# Patient Record
Sex: MALE | Race: WHITE | NOT HISPANIC OR LATINO | Employment: FULL TIME | URBAN - METROPOLITAN AREA
[De-identification: names, ages, dates, MRNs, and addresses within clinical notes are randomized per-mention and may not be internally consistent; named-entity substitution may affect disease eponyms.]

---

## 2017-01-04 ENCOUNTER — TELEPHONE (OUTPATIENT)
Dept: FAMILY MEDICINE | Facility: CLINIC | Age: 32
End: 2017-01-04

## 2017-01-04 NOTE — TELEPHONE ENCOUNTER
Patient would like to see if Dr. Tinsley or  would accept him as a new patient. Both doctors were highly recommended to him from his friend who works at [employer]. If they're willing to accept him as a patient, would they be willing to do a meet/greet or introductory appointment within the next couple of weeks?    Junie LAIRD  Central Scheduler

## 2017-01-05 NOTE — TELEPHONE ENCOUNTER
Sorry, I appreciate the request but I am not able to take him on at this time.    Sahil Tinsley MD

## 2017-01-23 ENCOUNTER — OFFICE VISIT (OUTPATIENT)
Dept: FAMILY MEDICINE | Facility: CLINIC | Age: 32
End: 2017-01-23
Payer: COMMERCIAL

## 2017-01-23 VITALS
OXYGEN SATURATION: 98 % | DIASTOLIC BLOOD PRESSURE: 79 MMHG | SYSTOLIC BLOOD PRESSURE: 126 MMHG | WEIGHT: 210 LBS | BODY MASS INDEX: 30.06 KG/M2 | HEART RATE: 77 BPM | HEIGHT: 70 IN | TEMPERATURE: 98 F | RESPIRATION RATE: 14 BRPM

## 2017-01-23 DIAGNOSIS — M25.562 LEFT KNEE PAIN, UNSPECIFIED CHRONICITY: ICD-10-CM

## 2017-01-23 DIAGNOSIS — R09.A2 GLOBUS SENSATION: ICD-10-CM

## 2017-01-23 DIAGNOSIS — N41.9 PROSTATITIS, UNSPECIFIED PROSTATITIS TYPE: ICD-10-CM

## 2017-01-23 DIAGNOSIS — M25.512 LEFT SHOULDER PAIN, UNSPECIFIED CHRONICITY: ICD-10-CM

## 2017-01-23 DIAGNOSIS — F90.8 ATTENTION-DEFICIT HYPERACTIVITY DISORDER, OTHER TYPE: Primary | ICD-10-CM

## 2017-01-23 DIAGNOSIS — H90.8 MIXED HEARING LOSS: ICD-10-CM

## 2017-01-23 LAB
ALBUMIN SERPL-MCNC: 4.2 G/DL (ref 3.4–5)
ALBUMIN UR-MCNC: NEGATIVE MG/DL
ALP SERPL-CCNC: 63 U/L (ref 40–150)
ALT SERPL W P-5'-P-CCNC: 31 U/L (ref 0–70)
ANION GAP SERPL CALCULATED.3IONS-SCNC: 7 MMOL/L (ref 3–14)
APPEARANCE UR: CLEAR
AST SERPL W P-5'-P-CCNC: 14 U/L (ref 0–45)
BASOPHILS # BLD AUTO: 0 10E9/L (ref 0–0.2)
BASOPHILS NFR BLD AUTO: 0.3 %
BILIRUB SERPL-MCNC: 0.5 MG/DL (ref 0.2–1.3)
BILIRUB UR QL STRIP: NEGATIVE
BUN SERPL-MCNC: 18 MG/DL (ref 7–30)
CALCIUM SERPL-MCNC: 9.1 MG/DL (ref 8.5–10.1)
CHLORIDE SERPL-SCNC: 104 MMOL/L (ref 94–109)
CHOLEST SERPL-MCNC: 214 MG/DL
CO2 SERPL-SCNC: 27 MMOL/L (ref 20–32)
COLOR UR AUTO: YELLOW
CREAT SERPL-MCNC: 1.17 MG/DL (ref 0.66–1.25)
DIFFERENTIAL METHOD BLD: NORMAL
EOSINOPHIL # BLD AUTO: 0.2 10E9/L (ref 0–0.7)
EOSINOPHIL NFR BLD AUTO: 3 %
ERYTHROCYTE [DISTWIDTH] IN BLOOD BY AUTOMATED COUNT: 12.5 % (ref 10–15)
GFR SERPL CREATININE-BSD FRML MDRD: 72 ML/MIN/1.7M2
GLUCOSE SERPL-MCNC: 91 MG/DL (ref 70–99)
GLUCOSE UR STRIP-MCNC: NEGATIVE MG/DL
HCT VFR BLD AUTO: 42.8 % (ref 40–53)
HDLC SERPL-MCNC: 40 MG/DL
HGB BLD-MCNC: 14.8 G/DL (ref 13.3–17.7)
HGB UR QL STRIP: NEGATIVE
KETONES UR STRIP-MCNC: NEGATIVE MG/DL
LDLC SERPL CALC-MCNC: 148 MG/DL
LEUKOCYTE ESTERASE UR QL STRIP: NEGATIVE
LYMPHOCYTES # BLD AUTO: 2 10E9/L (ref 0.8–5.3)
LYMPHOCYTES NFR BLD AUTO: 30.1 %
MCH RBC QN AUTO: 27.9 PG (ref 26.5–33)
MCHC RBC AUTO-ENTMCNC: 34.6 G/DL (ref 31.5–36.5)
MCV RBC AUTO: 81 FL (ref 78–100)
MONOCYTES # BLD AUTO: 0.7 10E9/L (ref 0–1.3)
MONOCYTES NFR BLD AUTO: 10.3 %
NEUTROPHILS # BLD AUTO: 3.8 10E9/L (ref 1.6–8.3)
NEUTROPHILS NFR BLD AUTO: 56.3 %
NITRATE UR QL: NEGATIVE
NONHDLC SERPL-MCNC: 174 MG/DL
PH UR STRIP: 7 PH (ref 5–7)
PLATELET # BLD AUTO: 247 10E9/L (ref 150–450)
POTASSIUM SERPL-SCNC: 4 MMOL/L (ref 3.4–5.3)
PROT SERPL-MCNC: 8 G/DL (ref 6.8–8.8)
PSA SERPL-MCNC: 0.48 UG/L (ref 0–4)
RBC # BLD AUTO: 5.31 10E12/L (ref 4.4–5.9)
SODIUM SERPL-SCNC: 138 MMOL/L (ref 133–144)
SP GR UR STRIP: 1.01 (ref 1–1.03)
TRIGL SERPL-MCNC: 128 MG/DL
TSH SERPL DL<=0.005 MIU/L-ACNC: 1.73 MU/L (ref 0.4–4)
URN SPEC COLLECT METH UR: NORMAL
UROBILINOGEN UR STRIP-ACNC: 0.2 EU/DL (ref 0.2–1)
WBC # BLD AUTO: 6.8 10E9/L (ref 4–11)

## 2017-01-23 PROCEDURE — 80050 GENERAL HEALTH PANEL: CPT | Performed by: PREVENTIVE MEDICINE

## 2017-01-23 PROCEDURE — 80061 LIPID PANEL: CPT | Performed by: PREVENTIVE MEDICINE

## 2017-01-23 PROCEDURE — 36415 COLL VENOUS BLD VENIPUNCTURE: CPT | Performed by: PREVENTIVE MEDICINE

## 2017-01-23 PROCEDURE — 99215 OFFICE O/P EST HI 40 MIN: CPT | Performed by: PREVENTIVE MEDICINE

## 2017-01-23 PROCEDURE — 81003 URINALYSIS AUTO W/O SCOPE: CPT | Performed by: PREVENTIVE MEDICINE

## 2017-01-23 PROCEDURE — 84153 ASSAY OF PSA TOTAL: CPT | Performed by: PREVENTIVE MEDICINE

## 2017-01-23 RX ORDER — ATOMOXETINE 80 MG/1
CAPSULE ORAL
Qty: 30 CAPSULE | Refills: 1 | Status: SHIPPED | OUTPATIENT
Start: 2017-01-23 | End: 2017-04-04

## 2017-01-23 NOTE — MR AVS SNAPSHOT
After Visit Summary   1/23/2017    Manjula Dorman    MRN: [id number]           Patient Information     Date Of Birth          1985        Visit Information        Provider Department      1/23/2017 2:00 PM Tod Grossman MD [facility]        Care Instructions      Preventive Health Recommendations  Male Ages 26 - 39    Yearly exam:             See your health care provider every year in order to  o   Review health changes.   o   Discuss preventive care.    o   Review your medicines if your doctor has prescribed any.    You should be tested each year for STDs (sexually transmitted diseases), if you re at risk.     After age 35, talk to your provider about cholesterol testing. If you are at risk for heart disease, have your cholesterol tested at least every 5 years.     If you are at risk for diabetes, you should have a diabetes test (fasting glucose).  Shots: Get a flu shot each year. Get a tetanus shot every 10 years.     Nutrition:    Eat at least 5 servings of fruits and vegetables daily.     Eat whole-grain bread, whole-wheat pasta and brown rice instead of white grains and rice.     Talk to your provider about Calcium and Vitamin D.     Lifestyle    Exercise for at least 150 minutes a week (30 minutes a day, 5 days a week). This will help you control your weight and prevent disease.     Limit alcohol to one drink per day.     No smoking.     Wear sunscreen to prevent skin cancer.     See your dentist every six months for an exam and cleaning.             Follow-ups after your visit        Who to contact     If you have questions or need follow up information about today's clinic visit or your schedule please contact [facility] directly at [phone].  Normal or non-critical lab and imaging results will be communicated to you by MyChart, letter or phone within 4 business days after the clinic has received the results. If you do not hear from us  "within 7 days, please contact the clinic through Emu Messenger or phone. If you have a critical or abnormal lab result, we will notify you by phone as soon as possible.  Submit refill requests through Emu Messenger or call your pharmacy and they will forward the refill request to us. Please allow 3 business days for your refill to be completed.          Additional Information About Your Visit        [employer] Information     E[employer] Messenger gives you secure access to your electronic health record. If you see a primary care provider, you can also send messages to your care team and make appointments. If you have questions, please call your primary care clinic.  If you do not have a primary care provider, please call [phone] and they will assist you.        Care EveryWhere ID     This is your Care EveryWhere ID. This could be used by other organizations to access your [facility] medical records  [id number]        Your Vitals Were     Pulse Temperature Respirations Height BMI (Body Mass Index) Pulse Oximetry    77 98  F (36.7  C) (Tympanic) 14 5' 9.72\" (1.771 m) 30.37 kg/m2 98%       Blood Pressure from Last 3 Encounters:   01/23/17 126/79   04/21/16 126/88   02/24/16 128/76    Weight from Last 3 Encounters:   01/23/17 210 lb (95.255 kg)   04/21/16 198 lb (89.812 kg)   02/24/16 199 lb 11.2 oz (90.583 kg)              Today, you had the following     No orders found for display         Today's Medication Changes          These changes are accurate as of: 1/23/17  2:11 PM.  If you have any questions, ask your nurse or doctor.               Stop taking these medicines if you haven't already. Please contact your care team if you have questions.     atomoxetine 80 MG capsule   Commonly known as:  STRATTERA   Stopped by:  Tod Grossman MD           Melatonin 1 MG Subl   Stopped by:  Tod Grossman MD           sulfamethoxazole-trimethoprim 800-160 MG per tablet   Commonly known as:  BACTRIM DS/SEPTRA DS "   Stopped by:  Tod Grossman MD                    Primary Care Provider Office Phone # Fax #    Tod Grossman [phone]       [facility] [address] [address]        Thank you!     Thank you for choosing [facility]  for your care. Our goal is always to provide you with excellent care. Hearing back from our patients is one way we can continue to improve our services. Please take a few minutes to complete the written survey that you may receive in the mail after your visit with us. Thank you!             Your Updated Medication List - Protect others around you: Learn how to safely use, store and throw away your medicines at www.disposemymeds.org.          This list is accurate as of: 1/23/17  2:11 PM.  Always use your most recent med list.                   Brand Name Dispense Instructions for use    ibuprofen 800 MG tablet    ADVIL/MOTRIN    60 tablet    Take 1 tablet (800 mg) by mouth every 8 hours as needed for moderate pain
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
## 2017-01-23 NOTE — NURSING NOTE
"Chief Complaint   Patient presents with     Physical     fasting       Initial /79 mmHg  Pulse 77  Temp(Src) 98  F (36.7  C) (Tympanic)  Resp 14  Ht 5' 9.72\" (1.771 m)  Wt 210 lb (95.255 kg)  BMI 30.37 kg/m2  SpO2 98% Estimated body mass index is 30.37 kg/(m^2) as calculated from the following:    Height as of this encounter: 5' 9.72\" (1.771 m).    Weight as of this encounter: 210 lb (95.255 kg).  BP completed using cuff size: ayala Head CMA January 23, 2017 2:06 PM      "

## 2017-01-24 ENCOUNTER — MYC MEDICAL ADVICE (OUTPATIENT)
Dept: FAMILY MEDICINE | Facility: CLINIC | Age: 32
End: 2017-01-24

## 2017-01-24 DIAGNOSIS — N41.8 OTHER PROSTATIC INFLAMMATORY DISEASES: Primary | ICD-10-CM

## 2017-01-24 RX ORDER — SULFAMETHOXAZOLE/TRIMETHOPRIM 800-160 MG
1 TABLET ORAL 2 TIMES DAILY
Qty: 84 TABLET | Refills: 0 | Status: SHIPPED | OUTPATIENT
Start: 2017-01-24 | End: 2017-04-04

## 2017-01-24 NOTE — TELEPHONE ENCOUNTER
Dr. Grossman,     Patient reports that pharmacy has not received Bactrim prescription.     Please advise based on Mobjoy[employer] message below.    Stormy Junior RN

## 2017-01-25 ENCOUNTER — MYC MEDICAL ADVICE (OUTPATIENT)
Dept: FAMILY MEDICINE | Facility: CLINIC | Age: 32
End: 2017-01-25

## 2017-01-26 ENCOUNTER — MYC MEDICAL ADVICE (OUTPATIENT)
Dept: FAMILY MEDICINE | Facility: CLINIC | Age: 32
End: 2017-01-26

## 2017-01-26 DIAGNOSIS — F98.8 ADD (ATTENTION DEFICIT DISORDER): Primary | ICD-10-CM

## 2017-01-27 RX ORDER — ATOMOXETINE 40 MG/1
40 CAPSULE ORAL DAILY
Qty: 30 CAPSULE | Refills: 1 | Status: SHIPPED | OUTPATIENT
Start: 2017-01-27 | End: 2017-01-27

## 2017-01-27 RX ORDER — ATOMOXETINE 40 MG/1
40 CAPSULE ORAL DAILY
Qty: 30 CAPSULE | Refills: 1 | Status: SHIPPED | OUTPATIENT
Start: 2017-01-27 | End: 2017-03-09 | Stop reason: DRUGHIGH

## 2017-01-29 NOTE — PROGRESS NOTES
"SUBJECTIVE:  Manjula Dorman, a 31 year old male scheduled an appointment to discuss the following issues:     Attention-deficit hyperactivity disorder, other type  Prostatitis, unspecified prostatitis type  Globus sensation  Mixed hearing loss  Left knee pain, unspecified chronicity  Left shoulder pain, unspecified chronicity  Pt here for follow up   Would like to restart medicine for adhd  Also has fullness in throat  Also some problems with hearing  Knee and shoulder pain  Also pelvic discomfort- same as prostatitis last year    Medical, social, surgical, and family histories reviewed.    ROS:  C: NEGATIVE for fever, chills  E: NEGATIVE for vision changes   R: NEGATIVE for significant cough or SOB  CV: NEGATIVE for chest pain, palpitations   GI: NEGATIVE for nausea, abdominal pain, heartburn, or change in bowel habits  : NEGATIVE for frequency, dysuria, or hematuria  M: NEGATIVE for significant arthralgias or myalgia  N: NEGATIVE for weakness, dizziness or paresthesias or headache    OBJECTIVE:  /79 mmHg  Pulse 77  Temp(Src) 98  F (36.7  C) (Tympanic)  Resp 14  Ht 5' 9.72\" (1.771 m)  Wt 210 lb (95.255 kg)  BMI 30.37 kg/m2  SpO2 98%  EXAM:  GENERAL APPEARANCE: healthy, alert and no distress  EYES: EOMI,  PERRL  HENT: ear canals and TM's normal and nose and mouth without ulcers or lesions  RESP: lungs clear to auscultation - no rales, rhonchi or wheezes  CV: regular rates and rhythm, normal S1 S2, no S3 or S4 and no murmur, click or rub -  ABDOMEN:  soft, nontender, no HSM or masses and bowel sounds normal  R SHOULDER -neg neer/herron, nl strenght int/ext rot and abd.  Normal active rom of shoulder and neck.  Neg spurlings.  No ttp of c spine or ac joint, clavicle or subacromial space.  Nl strength, sensation, and reflex in lue.  MSK - left knee - no ttp, neg ant, post drawer, neg mcmurrarys, trace effusion, no warmth, no erythema, nl rom, nl gait   - ttp prostate    ASSESSMENT/PLAN:  (F90.8) " Attention-deficit hyperactivity disorder, other type  (primary encounter diagnosis)  Start strattera 40 mg daily  Plan: atomoxetine (STRATTERA) 80 MG capsule, CBC with        platelets differential, Comprehensive metabolic        panel, Lipid panel reflex to direct LDL, TSH         with free T4 reflex  Recheck 2 months    (N41.9) Prostatitis, unspecified prostatitis type  Plan: UA reflex to Microscopic and Culture, PSA,         tumor marker  Will restart bactrim for this issue  Recheck 2 months    (F45.8) Globus sensation  Plan: OTOLARYNGOLOGY REFERRAL  Will refer to ent for evaluation     (H90.8) Mixed hearing loss  Plan: OTOLARYNGOLOGY REFERRAL  ent referral for this as well    (M25.562) Left knee pain, unspecified chronicity  Plan: ORTHOPEDICS ADULT REFERRAL    (M25.512) Left shoulder pain, unspecified chronicity  Plan: ORTHOPEDICS ADULT REFERRAL  Will refer to ortho for knee and shoulder pain    40 minutes spent with patient, over 50% time counseling, coordinating care and explaining about nature of the patient's conditions.    All risks, benefits of treatment and further evaluation was reviewed with patient.  Pt expressed understanding.  Pt was in agreement with this plan.  Tod Grossman MD

## 2017-02-28 ENCOUNTER — MYC MEDICAL ADVICE (OUTPATIENT)
Dept: FAMILY MEDICINE | Facility: CLINIC | Age: 32
End: 2017-02-28

## 2017-02-28 DIAGNOSIS — N41.9 PROSTATITIS, UNSPECIFIED PROSTATITIS TYPE: Primary | ICD-10-CM

## 2017-03-08 ENCOUNTER — TELEPHONE (OUTPATIENT)
Dept: FAMILY MEDICINE | Facility: CLINIC | Age: 32
End: 2017-03-08

## 2017-03-08 NOTE — TELEPHONE ENCOUNTER
Left message for pt on home number, and also sent my chart as pt communicates this way often.  Huddled with PCP, PLEASE SCHEDULE PT TOMORROW! Ok to use AM DOD spot or [facility] follow up spots  Dasha Pitt RN

## 2017-03-08 NOTE — TELEPHONE ENCOUNTER
Reason for call:  Patient reporting a symptom    Symptom or request: Difficulties urinating, painful ejaculation,   Nauseas, off balance, upset stomach    Duration (how long have symptoms been present): 2 weeks    Have you been treated for this before?     Additional comments: pt states that this happens after  He takes atomoxetine (STRATTERA) 40 MG capsule  Pt also stated he was taking 80 MG a day  Pt does have apt scheduled on 3/23  With Dr. Grossman    Phone Number patient can be reached at:  Other phone number:  [phone]    Best Time:  anytime      Can we leave a detailed message on this number:  YES    Call taken on 3/8/2017 at 12:12 PM by Farideh Ortiz

## 2017-03-08 NOTE — TELEPHONE ENCOUNTER
Yes, these are all potential side effects from Strattera.    Shamika Sexton, PharmD, [facility]  Medication Therapy Management Provider  Pager: [phone]

## 2017-03-09 ENCOUNTER — OFFICE VISIT (OUTPATIENT)
Dept: FAMILY MEDICINE | Facility: CLINIC | Age: 32
End: 2017-03-09
Payer: COMMERCIAL

## 2017-03-09 VITALS
SYSTOLIC BLOOD PRESSURE: 125 MMHG | OXYGEN SATURATION: 97 % | HEART RATE: 86 BPM | HEIGHT: 70 IN | TEMPERATURE: 98.3 F | WEIGHT: 201 LBS | BODY MASS INDEX: 28.77 KG/M2 | DIASTOLIC BLOOD PRESSURE: 79 MMHG

## 2017-03-09 DIAGNOSIS — F41.9 ANXIETY: ICD-10-CM

## 2017-03-09 DIAGNOSIS — F90.9 ATTENTION DEFICIT HYPERACTIVITY DISORDER (ADHD), UNSPECIFIED ADHD TYPE: Primary | ICD-10-CM

## 2017-03-09 PROCEDURE — 99214 OFFICE O/P EST MOD 30 MIN: CPT | Performed by: PREVENTIVE MEDICINE

## 2017-03-09 RX ORDER — DEXTROAMPHETAMINE SACCHARATE, AMPHETAMINE ASPARTATE, DEXTROAMPHETAMINE SULFATE AND AMPHETAMINE SULFATE 2.5; 2.5; 2.5; 2.5 MG/1; MG/1; MG/1; MG/1
10 TABLET ORAL 2 TIMES DAILY
Qty: 60 TABLET | Refills: 0 | Status: SHIPPED | OUTPATIENT
Start: 2017-03-09 | End: 2017-05-04

## 2017-03-09 NOTE — LETTER
[facility]    03/09/17    Patient: Manjula Dorman  YOB: 1985  Medical Record Number: [id number]                                                                  Controlled Substance Agreement  I understand that my care provider has prescribed controlled substances (narcotics, tranquilizers, and/or stimulants) to help manage my condition(s).  I am taking this medicine to help me function or work.  I know that this is strong medicine.  It could have serious side effects and even cause a dependency on the drug.  If I stop these medicines suddenly, I could have severe withdrawal symptoms.    The risks, benefits, and side effects of these medication(s) were explained to me.  I agree that:  1. I will take part in other treatments as advised by my provider.  This may be psychiatry or counseling, physical therapy, behavioral therapy, group treatment, or a referral to a pain clinic.  I will reduce or stop my medicine when my provider tells me to do so.   2. I will take my medicines as prescribed.  I will not change the dose or schedule unless my provider tells me to.  There will be no refills if I  run out early.   I may be contacted at any time without warning and asked to complete a drug test or pill count.   3. I will keep all my appointments at the clinic.  If I miss appointments or fail to follow instructions, my provider may stop my medicine.  4. I will not ask other providers to prescribe controlled substances. And I will not accept controlled substances from other people. If I need another prescribed controlled substance for a new reason, I will notify my provider within one business day.  5. If I enroll in the [place] Medical Marijuana program, I will tell my provider.  I will also sign an agreement to share my medical records with my provider.  6. I will use one pharmacy to fill all of my controlled substance prescriptions.  If my prescription is mailed to my pharmacy, it may take 5 to 7  days for my medicine to be ready.  7. I understand that my provider, clinic care team, and pharmacy can track controlled substance prescriptions from other providers through a central database (prescription monitoring program).  8. I will bring in my list of medications (or my medicine bottles) each time I come to the clinic.  REV-  04/2016                                                                                                                                            Page 1 of 2      [facility]    03/09/17    Patient: Manjula Dorman  YOB: 1985  Medical Record Number: [id number]    9. Refills of controlled substances will be made only during office hours.  It is up to me to make sure that I do not run out of my medicines on weekends or holidays.    10. I am responsible for my prescriptions.  If the medicine is lost or stolen, it will not be replaced.   I also agree not to share these medicines with anyone.  11. I agree to not use ANY illegal or recreational drugs.  This includes marijuana, cocaine, bath salts or other drugs.  I agree not to use alcohol unless my provider says I may.  I agree to give urine samples whenever asked.  If I fail to give a urine sample, the provider may stop my medicine.     12. I will tell my nurse or provider right away if I become pregnant or have a new medical problem treated outside of [facility].  13. I understand that this medicine can affect my thinking and judgment.  It may be unsafe for me to drive, use machinery and do dangerous tasks.  I will not do any of these things until I know how the medicine affects me.  If my dose changes, I will wait to see how it affects me.  I will contact my provider if I have concerns about medicine side effects.  I understand that if I do not follow any of the conditions above, my prescriptions or treatment may be stopped.    I agree that my provider, clinic care team, and pharmacy may work with any city,  state or federal law enforcement agency that investigates the misuse, sale, or other diversion of my controlled medicine. I will allow my provider to discuss my care with or share a copy of this agreement with any other treating provider, pharmacy or emergency room where I receive care.  I agree to give up (waive) any right of privacy or confidentiality with respect to these authorizations.   I have read this agreement and have asked questions about anything I did not understand.   ___________________________________    ___________________________  Patient Signature                                                           Date and Time  ___________________________________     ____________________________  Witness                                                                            Date and Time  ___________________________________  Tod Grossman MD, MD  REV-  04/2016                                                                                                                                                                 Page 2 of 2

## 2017-03-09 NOTE — NURSING NOTE
"Chief Complaint   Patient presents with     Recheck Medication     STRATTERA       Initial /79 (BP Location: Right arm, Patient Position: Chair, Cuff Size: Adult Large)  Pulse 86  Temp 98.3  F (36.8  C) (Oral)  Ht 5' 9.72\" (1.771 m)  Wt 201 lb (91.2 kg)  SpO2 97%  BMI 29.07 kg/m2 Estimated body mass index is 29.07 kg/(m^2) as calculated from the following:    Height as of this encounter: 5' 9.72\" (1.771 m).    Weight as of this encounter: 201 lb (91.2 kg).  Medication Reconciliation: complete   Yennifer Hi- SHANNAN      "

## 2017-03-09 NOTE — PROGRESS NOTES
"SUBJECTIVE:  Manjula Dorman, a 32 year old male scheduled an appointment to discuss the following issues:     Attention deficit hyperactivity disorder (ADHD), unspecified ADHD type  Anxiety  strattera causing sexual side effects for pt, it did help him focus at work  Pt also with significant anxiety and depressive symptoms  No si or hi      Medical, social, surgical, and family histories reviewed.      ROS:  C: NEGATIVE for fever, chills  E: NEGATIVE for vision changes   R: NEGATIVE for significant cough or SOB  CV: NEGATIVE for chest pain, palpitations   GI: NEGATIVE for nausea, abdominal pain, heartburn, or change in bowel habits  : NEGATIVE for frequency, dysuria, or hematuria  M: NEGATIVE for significant arthralgias or myalgia  N: NEGATIVE for weakness, dizziness or paresthesias or headache    OBJECTIVE:  /79 (BP Location: Right arm, Patient Position: Chair, Cuff Size: Adult Large)  Pulse 86  Temp 98.3  F (36.8  C) (Oral)  Ht 5' 9.72\" (1.771 m)  Wt 201 lb (91.2 kg)  SpO2 97%  BMI 29.07 kg/m2  EXAM:  GENERAL APPEARANCE: healthy, alert and no distress  EYES: EOMI,  PERRL  HENT: ear canals and TM's normal and nose and mouth without ulcers or lesions  RESP: lungs clear to auscultation - no rales, rhonchi or wheezes  CV: regular rates and rhythm, normal S1 S2, no S3 or S4 and no murmur, click or rub -  ABDOMEN:  soft, nontender, no HSM or masses and bowel sounds normal    ASSESSMENT/PLAN:  (F90.9) Attention deficit hyperactivity disorder (ADHD), unspecified ADHD type  (primary encounter diagnosis)  Plan: amphetamine-dextroamphetamine (ADDERALL) 10 MG         per tablet  Will start adderall 10 mg bid  Recheck with me in one month    (F41.9) Anxiety  Plan: sertraline (ZOLOFT) 50 MG tablet  Start zoloft for anxiety and depression    25 minutes spent with patient, over 50% time counseling, coordinating care and explaining about nature of the patient's conditions.    All risks, benefits of treatment and further " evaluation was reviewed with patient.  Pt expressed understanding.  Pt was in agreement with this plan.  Tod Grossman MD

## 2017-03-09 NOTE — MR AVS SNAPSHOT
After Visit Summary   3/9/2017    Manjula Dorman    MRN: [id number]           Patient Information     Date Of Birth          1985        Visit Information        Provider Department      3/9/2017 11:00 AM Tod Grossman MD [facility]         Follow-ups after your visit        Your next 10 appointments already scheduled     Mar 23, 2017  4:00 PM CDT   Office Visit with Tod Grossman MD   [facility] ([facility])    [address] [facility] [id number] [phone]           Bring a current list of meds and any records pertaining to this visit.  For Physicals, please bring immunization records and any forms needing to be filled out.  Please arrive 10 minutes early to complete paperwork.            Apr 04, 2017  1:30 PM CDT   (Arrive by 1:15 PM)   New Patient Visit with KENYATTA Moseley   [facility] and [facility] for Pro[facility] and Urologic C[facility] ([facility])    [address] [place] [address]  4th [facility] [address] [phone]              Who to contact     If you have questions or need follow up information about today's clinic visit or your schedule please contact [facility] directly at [phone].  Normal or non-critical lab and imaging results will be communicated to you by [employer], letter or phone within 4 business days after the clinic has received the results. If you do not hear from us within 7 days, please contact the clinic through [employer] or phone. If you have a critical or abnormal lab result, we will notify you by phone as soon as possible.  Submit refill requests through [employer] or call your pharmacy and they will forward the refill request to us. Please allow 3 business days for your refill to be completed.          Additional Information About Your Visit        [email or web address] Information     M[employer] gives you secure access to your electronic health record. If  "you see a primary care provider, you can also send messages to your care team and make appointments. If you have questions, please call your primary care clinic.  If you do not have a primary care provider, please call [phone] and they will assist you.        Care EveryWhere ID     This is your Care EveryWhere ID. This could be used by other organizations to access your [place] medical records  [id number]        Your Vitals Were     Pulse Temperature Height Pulse Oximetry BMI (Body Mass Index)       86 98.3  F (36.8  C) (Oral) 5' 9.72\" (1.771 m) 97% 29.07 kg/m2        Blood Pressure from Last 3 Encounters:   03/09/17 125/79   01/23/17 126/79   04/21/16 126/88    Weight from Last 3 Encounters:   03/09/17 201 lb (91.2 kg)   01/23/17 210 lb (95.3 kg)   04/21/16 198 lb (89.8 kg)              Today, you had the following     No orders found for display         Today's Medication Changes          These changes are accurate as of: 3/9/17 11:12 AM.  If you have any questions, ask your nurse or doctor.               These medicines have changed or have updated prescriptions.        Dose/Directions    atomoxetine 80 MG capsule   Commonly known as:  STRATTERA   This may have changed:  Another medication with the same name was removed. Continue taking this medication, and follow the directions you see here.   Used for:  Attention-deficit hyperactivity disorder, other type   Changed by:  Tod Grossman MD        40 mg daily for 3 days then 80 mg daily ongoing   Quantity:  30 capsule   Refills:  1                Primary Care Provider Office Phone # Fax #    Tod Grossman [phone]       [facility] [address] [facility] [address]  [facility] [address]        Thank you!     Thank you for choosing [facility]  for your care. Our goal is always to provide you with excellent care. Hearing back from our patients is one way we can continue to improve our " services. Please take a few minutes to complete the written survey that you may receive in the mail after your visit with us. Thank you!             Your Updated Medication List - Protect others around you: Learn how to safely use, store and throw away your medicines at www.disposemymeds.org.          This list is accurate as of: 3/9/17 11:12 AM.  Always use your most recent med list.                   Brand Name Dispense Instructions for use    atomoxetine 80 MG capsule    STRATTERA    30 capsule    40 mg daily for 3 days then 80 mg daily ongoing       ibuprofen 800 MG tablet    ADVIL/MOTRIN    60 tablet    Take 1 tablet (800 mg) by mouth every 8 hours as needed for moderate pain       sulfamethoxazole-trimethoprim 800-160 MG per tablet    BACTRIM DS/SEPTRA DS    84 tablet    Take 1 tablet by mouth 2 times daily

## 2017-03-10 ENCOUNTER — TELEPHONE (OUTPATIENT)
Dept: FAMILY MEDICINE | Facility: CLINIC | Age: 32
End: 2017-03-10

## 2017-03-10 NOTE — TELEPHONE ENCOUNTER
PA has been approved for the generic Adderall 10 mg for up to twice daily dosing. Start Date:02/08/2017;Coverage End Date:03/09/2020    Pato Pitts CMA

## 2017-03-13 ENCOUNTER — PRE VISIT (OUTPATIENT)
Dept: UROLOGY | Facility: CLINIC | Age: 32
End: 2017-03-13

## 2017-03-13 PROBLEM — F41.9 ANXIETY: Status: ACTIVE | Noted: 2017-03-13

## 2017-03-13 NOTE — TELEPHONE ENCOUNTER
Records are available in [facility]. Pt has seen Dr. Carvajal in clinic in 2011. vernon

## 2017-04-03 ASSESSMENT — ENCOUNTER SYMPTOMS
DIFFICULTY URINATING: 1
TREMORS: 1
HEMATURIA: 0
HEADACHES: 1
SNORES LOUDLY: 1
DEPRESSION: 1
MEMORY LOSS: 1
RESPIRATORY PAIN: 1
HOARSE VOICE: 1
WHEEZING: 1
INSOMNIA: 1
RECTAL BLEEDING: 1
SMELL DISTURBANCE: 0
NIGHT SWEATS: 1
SINUS PAIN: 1
WEAKNESS: 1
HALLUCINATIONS: 0
POLYPHAGIA: 1
PARALYSIS: 0
BLOATING: 1
FATIGUE: 1
BLOOD IN STOOL: 0
CHILLS: 1
DIZZINESS: 1
DECREASED APPETITE: 1
FEVER: 1
DYSPNEA ON EXERTION: 1
SEIZURES: 0
POOR WOUND HEALING: 0
TROUBLE SWALLOWING: 0
SPEECH CHANGE: 1
VOMITING: 0
DISTURBANCES IN COORDINATION: 1
LOSS OF CONSCIOUSNESS: 0
ABDOMINAL PAIN: 0
NECK MASS: 0
NAUSEA: 0
POLYDIPSIA: 0
WEIGHT GAIN: 1
NERVOUS/ANXIOUS: 1
TASTE DISTURBANCE: 0
BOWEL INCONTINENCE: 0
CONSTIPATION: 1
NUMBNESS: 0
SORE THROAT: 1
JAUNDICE: 0
NAIL CHANGES: 0
WEIGHT LOSS: 0
FLANK PAIN: 1
RECTAL PAIN: 1
PANIC: 0
POSTURAL DYSPNEA: 1
COUGH: 1
DYSURIA: 1
SHORTNESS OF BREATH: 1
HEMOPTYSIS: 0
TINGLING: 0
ALTERED TEMPERATURE REGULATION: 0
SKIN CHANGES: 0
COUGH DISTURBING SLEEP: 1
INCREASED ENERGY: 1
DECREASED CONCENTRATION: 1
SINUS CONGESTION: 1
SPUTUM PRODUCTION: 1
HEARTBURN: 0
DIARRHEA: 0

## 2017-04-04 ENCOUNTER — OFFICE VISIT (OUTPATIENT)
Dept: UROLOGY | Facility: CLINIC | Age: 32
End: 2017-04-04

## 2017-04-04 VITALS
HEIGHT: 70 IN | HEART RATE: 89 BPM | WEIGHT: 200 LBS | DIASTOLIC BLOOD PRESSURE: 81 MMHG | BODY MASS INDEX: 28.63 KG/M2 | SYSTOLIC BLOOD PRESSURE: 127 MMHG

## 2017-04-04 DIAGNOSIS — R10.31 RLQ ABDOMINAL PAIN: ICD-10-CM

## 2017-04-04 DIAGNOSIS — R10.2 PELVIC PAIN IN MALE: ICD-10-CM

## 2017-04-04 DIAGNOSIS — N42.81 PROSTATE PAIN: Primary | ICD-10-CM

## 2017-04-04 DIAGNOSIS — Z31.41 FERTILITY TESTING: ICD-10-CM

## 2017-04-04 LAB
ALBUMIN UR-MCNC: NEGATIVE MG/DL
APPEARANCE UR: CLEAR
BILIRUB UR QL STRIP: NEGATIVE
COLOR UR AUTO: ABNORMAL
GLUCOSE UR STRIP-MCNC: NEGATIVE MG/DL
HGB UR QL STRIP: NEGATIVE
KETONES UR STRIP-MCNC: NEGATIVE MG/DL
LEUKOCYTE ESTERASE UR QL STRIP: NEGATIVE
MUCOUS THREADS #/AREA URNS LPF: PRESENT /LPF
NITRATE UR QL: NEGATIVE
PH UR STRIP: 7 PH (ref 5–7)
RBC #/AREA URNS AUTO: 1 /HPF (ref 0–2)
SP GR UR STRIP: 1.01 (ref 1–1.03)
SQUAMOUS #/AREA URNS AUTO: <1 /HPF (ref 0–1)
URN SPEC COLLECT METH UR: ABNORMAL
UROBILINOGEN UR STRIP-MCNC: 0 MG/DL (ref 0–2)
WBC #/AREA URNS AUTO: 1 /HPF (ref 0–2)

## 2017-04-04 RX ORDER — ALBUTEROL SULFATE 90 UG/1
AEROSOL, METERED RESPIRATORY (INHALATION)
Refills: 0 | COMMUNITY
Start: 2017-03-13 | End: 2017-04-04

## 2017-04-04 RX ORDER — DOXYCYCLINE 100 MG/1
100 CAPSULE ORAL 2 TIMES DAILY
Qty: 42 CAPSULE | Refills: 1 | Status: SHIPPED | OUTPATIENT
Start: 2017-04-04 | End: 2017-07-21

## 2017-04-04 RX ORDER — ATOMOXETINE HYDROCHLORIDE 40 MG/1
CAPSULE ORAL
Refills: 1 | COMMUNITY
Start: 2017-01-27 | End: 2017-04-04

## 2017-04-04 RX ORDER — BENZONATATE 100 MG/1
CAPSULE ORAL
Refills: 0 | COMMUNITY
Start: 2017-03-13 | End: 2017-04-04

## 2017-04-04 ASSESSMENT — PAIN SCALES - GENERAL: PAINLEVEL: NO PAIN (1)

## 2017-04-04 NOTE — MR AVS SNAPSHOT
After Visit Summary   4/4/2017    Manjula Dorman    MRN: [id number]           Patient Information     Date Of Birth          1985        Visit Information        Provider Department      4/4/2017 1:30 PM Shyla Haider PA [facility] and [facility] for Prostate and Urologic Cancers        Today's Diagnoses     Prostate pain    -  1    RLQ abdominal pain        Fertility testing        Pelvic pain in male          Care Instructions    - CT stone protocol for right lower quadrant pain  - Urinalysis / urine culture  - Decodex testing (to identify small numbers of organisms)  - Doxycycline 100mg twice daily x 21 days with 1 refill (start the refill if you are getting better but you are not to 100%.  Stop the antibiotic if the first 3 weeks isn't helpful at all)   - Semen analysis  - Role for physical therapy (?)     Return in 2-3 months    Celia Haider          Follow-ups after your visit        Additional Services     ROSEL[facility] Physical Therapy Referral       **This order will print in the [facility] Scheduling Office**    Physical Therapy, Hand Therapy and Chiropractic Care are available through:    *[facility]  *[facility]  *[facility]    Call one number to schedule at any of the above locations: [phone].    Your provider has referred you to: Physical Therapy at [facility] or [facility]    Indication/Reason for Referral: Male myofascial dysfunction / pelvic pain  Onset of Illness: April 2016  Therapy Orders: Evaluate and Treat  Special Programs: Men's Health: Pelvic Pain - Specific Diagnosis: Pelvic pain, prostatitis and None    Monisha Bearden      Additional Comments for the Therapist or Chiropractor: none    Please be aware that coverage of these services is subject to the terms and limitations of your health insurance plan.  Call member services at your health plan with any benefit or coverage questions.      Please bring the following to your  appointment:    *Your personal calendar for scheduling future appointments  *Comfortable clothing                  Your next 10 appointments already scheduled     Apr 04, 2017  4:00 PM CDT   CT ABDOMEN PELVIS W/O CONTRAST with UCCT2   [facility] ([facility])    [address] [facility]  1st Floor  [facility] [id number] [phone]           Please bring any scans or X-rays taken at other hospitals, if similar tests were done. Also bring a list of your medicines, including vitamins, minerals and over-the-counter drugs. It is safest to leave personal items at home.  Be sure to tell your doctor:   If you have any allergies.   If there s any chance you are pregnant.   If you are breastfeeding.   If you have any special needs.  You do not need to do anything special to prepare.  Please wear loose clothing, such as a sweat suit or jogging clothes. Avoid snaps, zippers and other metal. We may ask you to undress and put on a hospital gown.            Jun 06, 2017  5:00 PM CDT   (Arrive by 4:45 PM)   Return Visit with KENYATTA Moseley   [facility] for Prostate and Urologic Cancers ([facility])    [address] [place] [address]  4th [facility] [address] [phone]              Future tests that were ordered for you today     Open Future Orders        Priority Expected Expires Ordered    ROSELIA Physical Therapy Referral Routine 4/4/2017 9/1/2017 4/4/2017    CT Abdomen pelvis w/o contrast Routine  4/4/2018 4/4/2017    Semen Analysis, Strict Morphology ([facility]) Routine 4/4/2017 7/3/2017 4/4/2017            Who to contact     Please call your clinic at [phone] to:    Ask questions about your health    Make or cancel appointments    Discuss your medicines    Learn about your test results    Speak to your doctor   If you have compliments or concerns about an experience at your clinic, or if you wish to file a complaint, please contact  "[facility] Patient Relations at [phone] or email us at [email or web address]         Additional Information About Your Visit        Biofisicahart Information     [employer] gives you secure access to your electronic health record. If you see a primary care provider, you can also send messages to your care team and make appointments. If you have questions, please call your primary care clinic.  If you do not have a primary care provider, please call [phone] and they will assist you.      epicurio is an electronic gateway that provides easy, online access to your medical records. With epicurio, you can request a clinic appointment, read your test results, renew a prescription or communicate with your care team.     To access your existing account, please contact your [facility] or call [phone] for assistance.        Care EveryWhere ID     This is your Care EveryWhere ID. This could be used by other organizations to access your [place] medical records  [id number]        Your Vitals Were     Pulse Height BMI (Body Mass Index)             89 1.778 m (5' 10\") 28.7 kg/m2          Blood Pressure from Last 3 Encounters:   04/04/17 127/81   03/09/17 125/79   01/23/17 126/79    Weight from Last 3 Encounters:   04/04/17 90.7 kg (200 lb)   03/09/17 91.2 kg (201 lb)   01/23/17 95.3 kg (210 lb)              We Performed the Following     DecodEX  Sequencing Comprehensive DNA: Laboratory Miscellaneous Order     Routine UA with microscopic - No culture     Urine Culture Aerobic Bacterial          Today's Medication Changes          These changes are accurate as of: 4/4/17  3:18 PM.  If you have any questions, ask your nurse or doctor.               Start taking these medicines.        Dose/Directions    doxycycline 100 MG capsule   Commonly known as:  VIBRAMYCIN   Used for:  Prostate pain   Started by:  Shyla Haider PA        Dose:  100 mg   Take 1 " capsule (100 mg) by mouth 2 times daily   Quantity:  42 capsule   Refills:  1            Where to get your medicines      These medications were sent to [facility] - [place], [place] - [address] AT [facility] [address] [address]     Phone:  [phone]     doxycycline 100 MG capsule                Primary Care Provider Office Phone # Fax #    Tod Jorgensen Jack Grossman [phone]       [facility] [address]  [facility] [address]        Thank you!     Thank you for choosing [facility]OLOGY AND [facility] FOR PROSTATE AND UROLOGIC CANCERS  for your care. Our goal is always to provide you with excellent care. Hearing back from our patients is one way we can continue to improve our services. Please take a few minutes to complete the written survey that you may receive in the mail after your visit with us. Thank you!             Your Updated Medication List - Protect others around you: Learn how to safely use, store and throw away your medicines at www.disposemymeds.org.          This list is accurate as of: 4/4/17  3:18 PM.  Always use your most recent med list.                   Brand Name Dispense Instructions for use    amphetamine-dextroamphetamine 10 MG per tablet    ADDERALL    60 tablet    Take 1 tablet (10 mg) by mouth 2 times daily       doxycycline 100 MG capsule    VIBRAMYCIN    42 capsule    Take 1 capsule (100 mg) by mouth 2 times daily       ibuprofen 800 MG tablet    ADVIL/MOTRIN    60 tablet    Take 1 tablet (800 mg) by mouth every 8 hours as needed for moderate pain       sertraline 50 MG tablet    ZOLOFT    30 tablet    Take 1/2 tablet (25 mg) for 1-2 weeks, then increase to 1 tablet orally daily

## 2017-04-04 NOTE — PATIENT INSTRUCTIONS
- CT stone protocol for right lower quadrant pain  - Urinalysis / urine culture  - Decodex testing (to identify small numbers of organisms)  - Doxycycline 100mg twice daily x 21 days with 1 refill (start the refill if you are getting better but you are not to 100%.  Stop the antibiotic if the first 3 weeks isn't helpful at all)   - Semen analysis  - Role for physical therapy (?)     Return in 2-3 months    Celia Haider

## 2017-04-04 NOTE — LETTER
Manjula LEE [facility]   [address] [place] [address]        Dear Manjula:     I am writing you concerning your recent test results:    Results for orders placed or performed in visit on 04/04/17   DecodEX  Sequencing Comprehensive DNA: Laboratory Miscellaneous Order   Result Value Ref Range    Miscellaneous Test       Specimen Received, Reordered and sent to Performing laboratory - Report to   follow upon completion.     Routine UA with microscopic - No culture   Result Value Ref Range    Color Urine Straw     Appearance Urine Clear     Glucose Urine Negative NEG mg/dL    Bilirubin Urine Negative NEG    Ketones Urine Negative NEG mg/dL    Specific Gravity Urine 1.008 1.003 - 1.035    Blood Urine Negative NEG    pH Urine 7.0 5.0 - 7.0 pH    Protein Albumin Urine Negative NEG mg/dL    Urobilinogen mg/dL 0.0 0.0 - 2.0 mg/dL    Nitrite Urine Negative NEG    Leukocyte Esterase Urine Negative NEG    Source Midstream Urine     WBC Urine 1 0 - 2 /HPF    RBC Urine 1 0 - 2 /HPF    Squamous Epithelial /HPF Urine <1 0 - 1 /HPF    Mucous Urine Present (A) NEG /LPF   Urine Culture Aerobic Bacterial   Result Value Ref Range    Specimen Description Midstream Urine     Special Requests Specimen received in preservative     Culture Micro No growth     Micro Report Status FINAL 04/05/2017      Resulted Orders   DecodEX  Sequencing Comprehensive DNA: Laboratory Miscellaneous Order   Result Value Ref Range    Miscellaneous Test       Specimen Received, Reordered and sent to Performing laboratory - Report to   follow upon completion.     Routine UA with microscopic - No culture   Result Value Ref Range    Color Urine Straw     Appearance Urine Clear     Glucose Urine Negative NEG mg/dL    Bilirubin Urine Negative NEG    Ketones Urine Negative NEG mg/dL    Specific Gravity Urine 1.008 1.003 - 1.035    Blood Urine Negative NEG    pH Urine 7.0 5.0 - 7.0 pH    Protein Albumin Urine Negative NEG mg/dL    Urobilinogen mg/dL 0.0 0.0 - 2.0 mg/dL     Nitrite Urine Negative NEG    Leukocyte Esterase Urine Negative NEG    Source Midstream Urine     WBC Urine 1 0 - 2 /HPF    RBC Urine 1 0 - 2 /HPF    Squamous Epithelial /HPF Urine <1 0 - 1 /HPF    Mucous Urine Present (A) NEG /LPF   Urine Culture Aerobic Bacterial   Result Value Ref Range    Specimen Description Midstream Urine     Special Requests Specimen received in preservative     Culture Micro No growth     Micro Report Status FINAL 04/05/2017        Your urinalysis and urine culture look fine.  We are still waiting for the DECODEX urine testing to return.  I will let you know when we have those results.     Please let me know if you have any questions.      Sincerely,      Shyla Haider PA-C  Physician Assistant Urology        [facility]OLOGY AND [facility] FOR PROSTATE AND UROLOGIC CANCERS  [address] [facility]  [address]th [facility] [address]  Phone: [phone]  Fax: [phone]

## 2017-04-04 NOTE — PROGRESS NOTES
"It was my pleasure to meet Mr. Manjula Dorman, a 32 year old year old male seen in consultation today for chief complaint: Consult (Prostatitis)      Has seen otis he  HPI: Mr. Manjula Dorman has PMH significant for ADHD, GERD/Zenkers diverticulum/Hiatal hernia, ADHD and recurrent prostatitis/chronic L testicular pain with scrotal US in 2010 and 2011 showing stable/thrombosed left varicocele, microcalcifications, but the most recent scrotal US on 3/15/16 was normal. With [facility] he was last seen by Dr. Carvajal in 2011.     More recently he tells me he had several good years until being Dx'd with prostatitis in 4/2016 - was given Bactrim to Cipro and took a long course - \" a good portion of last summer.\"  Notes residual pain from prostititis, increased in past 4-6 months.  Localizes to the bottom of the tailbone vs. rectum - not comfortable sitting- also low back pain and\"random right sided groin pains\"  - with voiding, right abdomen will seize up and pain radiates from RLQ downward.  States he's also been having trouble with intermittent painful urination and intermittent urinary urgency leakage with postvoid dribble. No aggravating or alleviating factors.      Pt also concerned about pain with ejaculation, notes he would have pain about half of the time.       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     Testicular pain, left     chronic, history of epididymitis     Varicocele     (L)     Voiding dysfunction 2/2011    mildly obstructive voiding " pattern with a moderately enlarged prostate on BRITTANY     Zenker's diverticulum        Past Surgical History:   Procedure Laterality Date     C IMPLANT COCHLEAR DEVICE  12/28/95    (L)     COLONOSCOPY  2009     ESOPHAGOSCOPY, GASTROSCOPY, DUODENOSCOPY (EGD), COMBINED  10/30/2012    Procedure: COMBINED ESOPHAGOSCOPY, GASTROSCOPY, DUODENOSCOPY (EGD), BIOPSY SINGLE OR MULTIPLE;  COMBINED ESOPHAGOSCOPY, GASTROSCOPY, DUODENOSCOPY (EGD)  ;  Surgeon: Gregor Albright MD;  Location:  GI     HC REMOVAL OF TONSILS,<13 Y/O  12/28/95     MASTOIDECTOMY  4/1997    (L) and type III tympanoplasty       FAMILY HISTORY: Denies family history of urologic cancer.  Dad (not biological father) soft tissue sarcoma  Maternal grandfather - prostate cancer    SOCIAL HISTORY: .  Wanting to have kids.  Works as a consultant.   reports that he has never smoked. He has never used smokeless tobacco.    Current Outpatient Prescriptions   Medication Sig Dispense Refill     ibuprofen (ADVIL,MOTRIN) 800 MG tablet Take 1 tablet (800 mg) by mouth every 8 hours as needed for moderate pain 60 tablet 0     amphetamine-dextroamphetamine (ADDERALL) 10 MG per tablet Take 1 tablet (10 mg) by mouth 2 times daily (Patient not taking: Reported on 4/4/2017) 60 tablet 0     sertraline (ZOLOFT) 50 MG tablet Take 1/2 tablet (25 mg) for 1-2 weeks, then increase to 1 tablet orally daily (Patient not taking: Reported on 4/4/2017) 30 tablet 1       ALLERGIES: No known allergies      REVIEW OF SYSTEMS:  Answers for HPI/ROS submitted by the patient on 4/3/2017   General Symptoms: Yes  Skin Symptoms: Yes  HENT Symptoms: Yes  EYE SYMPTOMS: No  HEART SYMPTOMS: No  LUNG SYMPTOMS: Yes  INTESTINAL SYMPTOMS: Yes  URINARY SYMPTOMS: Yes  REPRODUCTIVE SYMPTOMS: Yes  SKELETAL SYMPTOMS: No  BLOOD SYMPTOMS: No  NERVOUS SYSTEM SYMPTOMS: Yes  MENTAL HEALTH SYMPTOMS: Yes  Fever: Yes  Loss of appetite: Yes  Weight loss: No  Weight gain: Yes  Fatigue: Yes  Night sweats:  Yes  Chills: Yes  Increased stress: Yes  Excessive hunger: Yes  Excessive thirst: No  Feeling hot or cold when others believe the temperature is normal: No  Loss of height: No  Post-operative complications: No  Surgical site pain: No  Hallucinations: No  Change in or Loss of Energy: Yes  Hyperactivity: Yes  Confusion: Yes  Changes in hair: No  Changes in moles/birth marks: No  Itching: Yes  Rashes: Yes  Changes in nails: No  Acne: No  Change in facial hair: No  Warts: No  Non-healing sores: No  Scarring: No  Flaking of skin: No  Color changes of hands/feet in cold : No  Sun sensitivity: No  Skin thickening: No  Ear pain: Yes  Ear discharge: No  Hearing loss: No  Tinnitus: No  Nosebleeds: No  Congestion: Yes  Sinus pain: Yes  Trouble swallowing: No   Voice hoarseness: Yes  Mouth sores: Yes  Sore throat: Yes  Tooth pain: Yes  Gum tenderness: No  Bleeding gums: No  Change in taste: No  Change in sense of smell: No  Dry mouth: No  Hearing aid used: No  Neck lump: No  Cough: Yes  Sputum or phlegm: Yes  Coughing up blood: No  Difficulty breating or shortness of breath: Yes  Snoring: Yes  Wheezing: Yes  Difficulty breathing on exertion: Yes  Respiratory pain: Yes  Nighttime Cough: Yes  Difficulty breathing when lying flat: Yes  Heart burn or indigestion: No  Nausea: No  Vomiting: No  Abdominal pain: No  Bloating: Yes  Constipation: Yes  Diarrhea: No  Blood in stool: No  Black stools: No  Rectal or Anal pain: Yes  Fecal incontinence: No  Rectal bleeding: Yes  Yellowing of skin or eyes: No  Vomit with blood: No  Change in stools: No  Hemorrhoids: No  Trouble holding urine or incontinence: Yes  Pain or burning: Yes  Trouble starting or stopping: Yes  Increased frequency of urination: Yes  Blood in urine: No  Decreased frequency of urination: No  Frequent nighttime urination: Yes  Flank pain: Yes  Difficulty emptying bladder: Yes  Trouble with coordination: Yes  Dizziness or trouble with balance: Yes  Fainting or black-out  "spells: No  Memory loss: Yes  Headache: Yes  Seizures: No  Speech problems: Yes  Tingling: No  Tremor: Yes  Weakness: Yes  Difficulty walking: No  Paralysis: No  Numbness: No  Scrotal pain or swelling: Yes  Erectile dysfunction: No  Penile discharge: No  Genital ulcers: No  Reduced libido: Yes  Nervous or Anxious: Yes  Depression: Yes  Trouble sleeping: Yes  Trouble thinking or concentrating: Yes  Mood changes: Yes  Panic attacks: No      GENERAL PHYSICAL EXAM:   Vitals: /81  Pulse 89  Ht 1.778 m (5' 10\")  Wt 90.7 kg (200 lb)  BMI 28.7 kg/m2  Body mass index is 28.7 kg/(m^2).    GENERAL: Well groomed, well developed, well nourished male in NAD.  GI: Soft, NT, ND, no palpable masses.  No CVAT bilaterally.  MS: Gait normal, normal muscle tone  SKIN: Warm to touch, dry.  No visible rashes or lesions on examined areas.  HEMATOLOGIC/LYMPHATIC/IMMUNOLOGIC: normal inguinal nodes.   No LE edema.  NEURO: Alert and oriented x 3.  PSYCH: Normal mood and affect, pleasant and agreeable during interview and exam.     :      Inguinal: no hernias or palpable lymph nodes      Circumcised penis, no penile plaques or lesions. Orthotopic location of the urethral meatus.       Scrotum normal.      Testicles of normal firmness and consistency, no masses.      Epididymes bilaterally without masses or tenderness.       BRITTANY:      right rectal tone, none amount of stool in the rectal vault.      Small-medium sized prostate, + mild tenderness and boggy without nodules or asymmetry.    RADIOLOGY: The following tests were reviewed:   TESTICULAR ULTRASOUND 3/15/2016 7:24 AM     HISTORY: Bilateral pain.     COMPARISON: None.     TECHNIQUE: Doppler waveform analysis performed.     FINDINGS: Both testicles are normal in size and contour with the right  measuring 3.6 x 3.2 x 2.1 cm and the left 3.5 x 2.8 x 1.9 cm. Both  testicles have normal color flow and echogenicity without evidence of  torsion or mass. Both epididymis are normal. No " hydrocele or  varicocele identified on either side. Remainder of the scan is  unremarkable.     IMPRESSION: Normal testicular ultrasound.      XR KUB 6/28/2016 9:35 AM      HISTORY: Left flank pain. Prostatitis.     IMPRESSION: KUB is negative.    LABS: The last test results for Ms. Manjula Dorman were reviewed.   PSA -   Lab Results   Component Value Date    PSA 0.48 01/23/2017     BMP -   Recent Labs   Lab Test  01/23/17   1453  09/14/15   0825  08/22/14   0902   NA  138  139  137   POTASSIUM  4.0  3.9  4.5   CHLORIDE  104  105  104   CO2  27  25  29   BUN  18  21  21   CR  1.17  1.26*  1.36*   GLC  91  91  92   AMAN  9.1  8.8  9.2       CBC -   Recent Labs   Lab Test  01/23/17   1453  09/14/15   0825  08/22/14   0902   WBC  6.8  5.2  7.1   HGB  14.8  14.5  15.1   PLT  247  225  248       ASSESSMENT:   1) Chronic prostatitis  2) Right groin pain  3) Fertility concerns    PLAN:   - CT stone protocol for RLQ pain -   - Urine specimen collected post prostate massage (UA, UCx and will send out a DecodEX)  - Role for pelvic floor physical therapy (?) given back pain and groin pain - WIll RX   - Doxycycline 100mg twice daily x 21 days with 1 refill (if he is improving after 21 days but still not 100%)  - Wants a semen analysis - will pursue pregnancy with his wife  - Return in 2-3 months    Celia Haider PA-C  Department of Urologic Surgery

## 2017-04-04 NOTE — LETTER
"4/4/2017       RE: Manjula Dorman  [address]  [place] [facility] [address]     Dear Colleague,    Thank you for referring your patient, Manjula Dorman, to the [facility] AND INST FOR PROSTATE AND UROLOGIC CANCERS at [facility]. Please see a copy of my visit note below.    It was my pleasure to meet Mr. Manjula Dorman, a 32 year old year old male seen in consultation today for chief complaint: Consult (Prostatitis)      Has seen otis he  HPI: Mr. Manjula Dorman has PMH significant for ADHD, GERD/Zenkers diverticulum/Hiatal hernia, ADHD and recurrent prostatitis/chronic L testicular pain with scrotal US in 2010 and 2011 showing stable/thrombosed left varicocele, microcalcifications, but the most recent scrotal US on 3/15/16 was normal. With [facility] he was last seen by Dr. Carvajal in 2011.     More recently he tells me he had several good years until being Dx'd with prostatitis in 4/2016 - was given Bactrim to Cipro and took a long course - \" a good portion of last summer.\"  Notes residual pain from prostititis, increased in past 4-6 months.  Localizes to the bottom of the tailbone vs. rectum - not comfortable sitting- also low back pain and\"random right sided groin pains\"  - with voiding, right abdomen will seize up and pain radiates from RLQ downward.  States he's also been having trouble with intermittent painful urination and intermittent urinary urgency leakage with postvoid dribble. No aggravating or alleviating factors.      Pt also concerned about pain with ejaculation, notes he would have pain about half of the time.       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     Testicular pain, left     chronic, history of epididymitis     Varicocele     (L)     Voiding dysfunction 2/2011    mildly obstructive voiding pattern with a moderately enlarged prostate on BRITTANY     Zenker's diverticulum        Past Surgical History:   Procedure Laterality Date     C IMPLANT COCHLEAR DEVICE  12/28/95    (L)     COLONOSCOPY  2009     ESOPHAGOSCOPY, GASTROSCOPY, DUODENOSCOPY (EGD), COMBINED  10/30/2012    Procedure: COMBINED ESOPHAGOSCOPY, GASTROSCOPY, DUODENOSCOPY (EGD), BIOPSY SINGLE OR MULTIPLE;  COMBINED ESOPHAGOSCOPY, GASTROSCOPY, DUODENOSCOPY (EGD)  ;  Surgeon: Gregor Albright MD;  Location:  GI     HC REMOVAL OF TONSILS,<11 Y/O  12/28/95     MASTOIDECTOMY  4/1997    (L) and type III tympanoplasty       FAMILY HISTORY: Denies family history of urologic cancer.  Dad (not biological father) soft tissue sarcoma  Maternal grandfather - prostate cancer    SOCIAL HISTORY: .  Wanting to have kids.  Works as a consultant.   reports that he has never smoked. He has never used smokeless tobacco.    Current Outpatient Prescriptions   Medication Sig Dispense Refill     ibuprofen (ADVIL,MOTRIN) 800 MG tablet Take 1 tablet (800 mg) by mouth every 8 hours as needed for moderate pain 60 tablet 0     amphetamine-dextroamphetamine (ADDERALL) 10 MG per tablet Take 1 tablet (10 mg) by mouth 2 times daily (Patient not taking: Reported on 4/4/2017) 60 tablet 0     sertraline (ZOLOFT) 50 MG tablet Take 1/2 tablet (25 mg) for 1-2 weeks, then increase to 1 tablet orally daily (Patient not taking: Reported on 4/4/2017) 30 tablet 1       ALLERGIES: No known allergies     GENERAL PHYSICAL EXAM:   Vitals: /81  Pulse 89  Ht 1.778 m (5' 10\")  Wt 90.7 kg (200 lb)  BMI 28.7 kg/m2  Body mass index is 28.7 kg/(m^2).    GENERAL: Well groomed, well " developed, well nourished male in NAD.  GI: Soft, NT, ND, no palpable masses.  No CVAT bilaterally.  MS: Gait normal, normal muscle tone  SKIN: Warm to touch, dry.  No visible rashes or lesions on examined areas.  HEMATOLOGIC/LYMPHATIC/IMMUNOLOGIC: normal inguinal nodes.   No LE edema.  NEURO: Alert and oriented x 3.  PSYCH: Normal mood and affect, pleasant and agreeable during interview and exam.     :      Inguinal: no hernias or palpable lymph nodes      Circumcised penis, no penile plaques or lesions. Orthotopic location of the urethral meatus.       Scrotum normal.      Testicles of normal firmness and consistency, no masses.      Epididymes bilaterally without masses or tenderness.       BRITTANY:      right rectal tone, none amount of stool in the rectal vault.      Small-medium sized prostate, + mild tenderness and boggy without nodules or asymmetry.    RADIOLOGY: The following tests were reviewed:   TESTICULAR ULTRASOUND 3/15/2016 7:24 AM     HISTORY: Bilateral pain.     COMPARISON: None.     TECHNIQUE: Doppler waveform analysis performed.     FINDINGS: Both testicles are normal in size and contour with the right  measuring 3.6 x 3.2 x 2.1 cm and the left 3.5 x 2.8 x 1.9 cm. Both  testicles have normal color flow and echogenicity without evidence of  torsion or mass. Both epididymis are normal. No hydrocele or  varicocele identified on either side. Remainder of the scan is  unremarkable.     IMPRESSION: Normal testicular ultrasound.      XR KUB 6/28/2016 9:35 AM      HISTORY: Left flank pain. Prostatitis.     IMPRESSION: KUB is negative.    LABS: The last test results for Ms. Manjula Dorman were reviewed.   PSA -   Lab Results   Component Value Date    PSA 0.48 01/23/2017     BMP -   Recent Labs   Lab Test  01/23/17   1453  09/14/15   0825  08/22/14   0902   NA  138  139  137   POTASSIUM  4.0  3.9  4.5   CHLORIDE  104  105  104   CO2  27  25  29   BUN  18  21  21   CR  1.17  1.26*  1.36*   GLC  91  91  92    AMAN  9.1  8.8  9.2       CBC -   Recent Labs   Lab Test  01/23/17   1453  09/14/15   0825  08/22/14   0902   WBC  6.8  5.2  7.1   HGB  14.8  14.5  15.1   PLT  247  225  248       ASSESSMENT:   1) Chronic prostatitis  2) Right groin pain  3) Fertility concerns    PLAN:   - CT stone protocol for RLQ pain -   - Urine specimen collected post prostate massage (UA, UCx and will send out a DecodEX)  - Role for pelvic floor physical therapy (?) given back pain and groin pain - WIll RX   - Doxycycline 100mg twice daily x 21 days with 1 refill (if he is improving after 21 days but still not 100%)  - Wants a semen analysis - will pursue pregnancy with his wife  - Return in 2-3 months    Celia Haider PA-C  Department of Urologic Surgery

## 2017-04-04 NOTE — NURSING NOTE
"Chief Complaint   Patient presents with     Consult     Prostatitis       Initial Ht 1.778 m (5' 10\")  Wt 90.7 kg (200 lb)  BMI 28.7 kg/m2 Estimated body mass index is 28.7 kg/(m^2) as calculated from the following:    Height as of this encounter: 1.778 m (5' 10\").    Weight as of this encounter: 90.7 kg (200 lb).  Medication Reconciliation: complete     BARBARA Castellano    "

## 2017-04-05 LAB
BACTERIA SPEC CULT: NO GROWTH
Lab: NORMAL
MICRO REPORT STATUS: NORMAL
MISCELLANEOUS TEST: NORMAL
SPECIMEN SOURCE: NORMAL

## 2017-04-24 ENCOUNTER — MYC MEDICAL ADVICE (OUTPATIENT)
Dept: UROLOGY | Facility: CLINIC | Age: 32
End: 2017-04-24

## 2017-04-27 LAB — LAB SCANNED RESULT: NORMAL

## 2017-06-09 ENCOUNTER — MYC REFILL (OUTPATIENT)
Dept: FAMILY MEDICINE | Facility: CLINIC | Age: 32
End: 2017-06-09

## 2017-06-09 DIAGNOSIS — F90.9 ATTENTION DEFICIT HYPERACTIVITY DISORDER (ADHD), UNSPECIFIED ADHD TYPE: ICD-10-CM

## 2017-06-09 DIAGNOSIS — F41.9 ANXIETY: ICD-10-CM

## 2017-06-09 RX ORDER — DEXTROAMPHETAMINE SACCHARATE, AMPHETAMINE ASPARTATE, DEXTROAMPHETAMINE SULFATE AND AMPHETAMINE SULFATE 2.5; 2.5; 2.5; 2.5 MG/1; MG/1; MG/1; MG/1
10 TABLET ORAL 2 TIMES DAILY
Qty: 60 TABLET | Refills: 0 | Status: SHIPPED | OUTPATIENT
Start: 2017-06-09 | End: 2017-07-11

## 2017-06-09 NOTE — TELEPHONE ENCOUNTER
Pending Prescriptions:                       Disp   Refills    sertraline (ZOLOFT) 50 MG tablet          30 tab*1            Sig: Take 1 tablet (50 mg) by mouth daily           Last Written Prescription Date: 05/05/2017  Last Fill Quantity: 30; # refills: 1  Last Office Visit with [facility], [facility] or [facility] prescribing provider:  03/23/2017        Last PHQ-9 score on record=   PHQ-9 SCORE 7/22/2011   Total Score 5       Lab Results   Component Value Date    AST 14 01/23/2017     Lab Results   Component Value Date    ALT 31 01/23/2017

## 2017-06-09 NOTE — TELEPHONE ENCOUNTER
Message from Drop Messages:  Original authorizing provider: MD Manjula Grajeda ROSADaryn Dorman would like a refill of the following medications:  amphetamine-dextroamphetamine (ADDERALL) 10 MG per tablet [Ray Johnson MD]    Preferred pharmacy: [facility] [address] [place] [address] AT [facility] [address]    Comment:  Hello - I need to refill my scripts. Please send them to the pharmacy on file. Also, I believe I need to schedule an appointment with a new physician due to Dr. Grossman leaving. Is there a physician that is recommended? Thank you!    Medication renewals requested in this message routed to other providers:  sertraline (ZOLOFT) 50 MG tablet [Tod Grossman MD, MD]

## 2017-06-09 NOTE — TELEPHONE ENCOUNTER
Prescription approved per [facility] Refill Protocol.  Prescribed for Anxiety--can be filled up to 1 year since last OV    Stormy Junior RN

## 2017-06-09 NOTE — TELEPHONE ENCOUNTER
Pending Prescriptions:                       Disp   Refills    amphetamine-dextroamphetamine (ADDERALL) *60 tab*0            Sig: Take 1 tablet (10 mg) by mouth 2 times daily          Last Written Prescription Date:  05/05/2017  Last Fill Quantity: 60,   # refills: 0  Last Office Visit with [facility] or [facility] prescribing provider: 03/23/2017  Future Office visit:       Routing refill request to provider for review/approval because:  Drug not on the [facility] or [facility] refill protocol or controlled substance

## 2017-06-09 NOTE — TELEPHONE ENCOUNTER
Message from Saperion:  Original authorizing provider: Tod Grossman MD, MD Fair FLAKO Enzojasbirs would like a refill of the following medications:  sertraline (ZOLOFT) 50 MG tablet [Tod Grossman MD, MD]    Preferred pharmacy: [facility] [address] [place] [address] AVE [address] AT [facility] [address]    Comment:  Hello - I need to refill my scripts. Please send them to the pharmacy on file. Also, I believe I need to schedule an appointment with a new physician due to Dr. Grossman leaving. Is there a physician that is recommended? Thank you!    Medication renewals requested in this message routed to other providers:  amphetamine-dextroamphetamine (ADDERALL) 10 MG per tablet [Ray Johnson MD]

## 2017-06-12 ENCOUNTER — MYC MEDICAL ADVICE (OUTPATIENT)
Dept: FAMILY MEDICINE | Facility: CLINIC | Age: 32
End: 2017-06-12

## 2017-07-10 ENCOUNTER — MYC REFILL (OUTPATIENT)
Dept: FAMILY MEDICINE | Facility: CLINIC | Age: 32
End: 2017-07-10

## 2017-07-10 DIAGNOSIS — F41.9 ANXIETY: ICD-10-CM

## 2017-07-11 ENCOUNTER — MYC REFILL (OUTPATIENT)
Dept: FAMILY MEDICINE | Facility: CLINIC | Age: 32
End: 2017-07-11

## 2017-07-11 DIAGNOSIS — F90.9 ATTENTION DEFICIT HYPERACTIVITY DISORDER (ADHD), UNSPECIFIED ADHD TYPE: ICD-10-CM

## 2017-07-11 RX ORDER — DEXTROAMPHETAMINE SACCHARATE, AMPHETAMINE ASPARTATE, DEXTROAMPHETAMINE SULFATE AND AMPHETAMINE SULFATE 2.5; 2.5; 2.5; 2.5 MG/1; MG/1; MG/1; MG/1
10 TABLET ORAL 2 TIMES DAILY
Qty: 60 TABLET | Refills: 0 | Status: SHIPPED | OUTPATIENT
Start: 2017-07-11 | End: 2017-09-27

## 2017-07-11 NOTE — TELEPHONE ENCOUNTER
adderall      Last Written Prescription Date:  6/9/17  Last Fill Quantity: 60,   # refills: 0  Last Office Visit with [facility], [facility] or [facility] prescribing provider: 3/23/17  Future Office visit:    Next 5 appointments (look out 90 days)     Jul 21, 2017 10:00 AM CDT   [facility]    [address] [facility] [address]   [phone]                   Routing refill request to provider for review/approval because:  Drug not on the [facility], [facility] or [facility] refill protocol or controlled substance

## 2017-07-11 NOTE — TELEPHONE ENCOUNTER
Message from [employer]:  Original authorizing provider: Estrada De La Rosa MD    Manjula Dorman would like a refill of the following medications:  amphetamine-dextroamphetamine (ADDERALL) 10 MG per tablet [Estrada De La Rosa MD]    Preferred pharmacy: [facility] [address] [place], [place] [id number] AT [facility] [address]    Comment:  Hello - I need to refill my Adderall prescription. I have an appointment with Dr. Johnson on July 21st. However, I will run out by Sunday. Thank you!

## 2017-07-21 ENCOUNTER — OFFICE VISIT (OUTPATIENT)
Dept: FAMILY MEDICINE | Facility: CLINIC | Age: 32
End: 2017-07-21
Payer: COMMERCIAL

## 2017-07-21 DIAGNOSIS — M25.512 CHRONIC LEFT SHOULDER PAIN: ICD-10-CM

## 2017-07-21 DIAGNOSIS — F90.9 ATTENTION DEFICIT HYPERACTIVITY DISORDER (ADHD), UNSPECIFIED ADHD TYPE: Primary | ICD-10-CM

## 2017-07-21 DIAGNOSIS — Z78.9 TAKES DIETARY SUPPLEMENTS: ICD-10-CM

## 2017-07-21 DIAGNOSIS — G89.29 CHRONIC LEFT SHOULDER PAIN: ICD-10-CM

## 2017-07-21 DIAGNOSIS — R55 VASO-VAGAL REACTION: ICD-10-CM

## 2017-07-21 DIAGNOSIS — G89.29 CHRONIC LEFT-SIDED LOW BACK PAIN WITH LEFT-SIDED SCIATICA: ICD-10-CM

## 2017-07-21 DIAGNOSIS — M54.42 CHRONIC LEFT-SIDED LOW BACK PAIN WITH LEFT-SIDED SCIATICA: ICD-10-CM

## 2017-07-21 DIAGNOSIS — N39.9 URINATION DISORDER: ICD-10-CM

## 2017-07-21 DIAGNOSIS — F41.8 DEPRESSION WITH ANXIETY: ICD-10-CM

## 2017-07-21 PROCEDURE — 99215 OFFICE O/P EST HI 40 MIN: CPT | Performed by: INTERNAL MEDICINE

## 2017-07-21 RX ORDER — DEXTROAMPHETAMINE SACCHARATE, AMPHETAMINE ASPARTATE MONOHYDRATE, DEXTROAMPHETAMINE SULFATE AND AMPHETAMINE SULFATE 5; 5; 5; 5 MG/1; MG/1; MG/1; MG/1
20 CAPSULE, EXTENDED RELEASE ORAL DAILY
Qty: 30 CAPSULE | Refills: 0 | Status: SHIPPED | OUTPATIENT
Start: 2017-07-21 | End: 2017-08-23

## 2017-07-21 RX ORDER — SERTRALINE HYDROCHLORIDE 100 MG/1
100 TABLET, FILM COATED ORAL DAILY
Qty: 30 TABLET | Refills: 1 | Status: SHIPPED | OUTPATIENT
Start: 2017-07-21 | End: 2017-09-27

## 2017-07-21 ASSESSMENT — ANXIETY QUESTIONNAIRES
2. NOT BEING ABLE TO STOP OR CONTROL WORRYING: SEVERAL DAYS
6. BECOMING EASILY ANNOYED OR IRRITABLE: NEARLY EVERY DAY
5. BEING SO RESTLESS THAT IT IS HARD TO SIT STILL: NEARLY EVERY DAY
3. WORRYING TOO MUCH ABOUT DIFFERENT THINGS: MORE THAN HALF THE DAYS
1. FEELING NERVOUS, ANXIOUS, OR ON EDGE: SEVERAL DAYS
GAD7 TOTAL SCORE: 14
IF YOU CHECKED OFF ANY PROBLEMS ON THIS QUESTIONNAIRE, HOW DIFFICULT HAVE THESE PROBLEMS MADE IT FOR YOU TO DO YOUR WORK, TAKE CARE OF THINGS AT HOME, OR GET ALONG WITH OTHER PEOPLE: SOMEWHAT DIFFICULT
7. FEELING AFRAID AS IF SOMETHING AWFUL MIGHT HAPPEN: MORE THAN HALF THE DAYS

## 2017-07-21 ASSESSMENT — PATIENT HEALTH QUESTIONNAIRE - PHQ9: 5. POOR APPETITE OR OVEREATING: MORE THAN HALF THE DAYS

## 2017-07-21 NOTE — PROGRESS NOTES
HPI Comments:   Patient comes in today with multiple medical concerns.    He has a history of attention deficit disorder and is currently on immediate release Adderall 10 mg twice a day. He feels that the effect does not last long enough later in the day. He is tolerating the medication well. He has tried Concerta in the past (which his PCP discontinued it due to concern over long-term cardiac repercussions) and Strattera which the patient did not tolerate.    Patient also has depression with anxiety for which he is on Zoloft 50 mg daily. He says that his depression and anxiety have improved after starting the medication. His PHQ9 and HOMA 7 score today reflects that he still has moderate anxiety and depression. Denies suicidal ideation. Tolerating the medication well.    He complains of chronic left shoulder pain.  No recollection of trauma.    Complains of chronic left-sided lower back pain which occasionally radiates to the left lower extremity. No recollection of trauma. No urinary or bowel incontinence or saddle anesthesia.    States that whenever he urinates he has intermittent severe cramping of the right lower quadrant of his abdomen. Has already consulted with a urologist in the past and workup was reportedly unremarkable. Was advised to try pelvic exercises which the patient has not tried yet.    Patient was also concerned about an episode where he was diaphoretic and almost passed out as he was having a bowel movement. On further questioning, patient does state that he was straining hard when this episode occurred. No history of repeated near-syncopal/syncopal episodes.    Patient is also asking about a supplement that his chiropractor suggested for him to take. Upon looking at the supplement ingredients, I noted that there were a number of herbal products that were incorporated into the supplement.      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   Gastrointestinal: Positive for abdominal pain. Negative for nausea and vomiting.   Genitourinary: Negative for dysuria, flank pain, frequency, hematuria and urgency.   Musculoskeletal: Positive for back pain and joint pain.   Neurological: Negative for tremors, sensory change, focal weakness, loss of consciousness and headaches.   Psychiatric/Behavioral: Positive for depression. Negative for hallucinations, substance abuse and suicidal ideas. The patient is nervous/anxious. The patient does not have insomnia.        There were no vitals taken for this visit.      Physical Exam   Constitutional: He is oriented to person, place, and time. No distress.   Neck: No thyromegaly present.   Cardiovascular: Normal rate, regular rhythm and normal heart sounds.    Abdominal: Soft. There is no tenderness.   Musculoskeletal: Normal range of motion. He exhibits tenderness (left lower back and left shoulder).   Neurological: He is alert and oriented to person, place, and time. He has normal reflexes. Gait normal. Coordination normal. GCS score is 15.   Psychiatric: Affect normal.   Anxious appearing   Vitals reviewed.        ICD-10-CM    1. Attention deficit hyperactivity disorder (ADHD), unspecified ADHD type F90.9 amphetamine-dextroamphetamine (ADDERALL XR) 20 MG per 24 hr capsule   2. Depression with anxiety F41.8 sertraline (ZOLOFT) 100 MG tablet   3. Chronic  left shoulder pain M25.512 CANCELED: [facility]    G89.29    4. Chronic left-sided low back pain with left-sided sciatica M54.42 CANCELED: [facility]    G89.29    5. Urination disorder N39.9 [facility]   6. Vaso-vagal reaction R55 **discussed with patient the pathophysiology of vasovagal reactions and advised patient to refrain from straining when moving his bowels    7. Takes dietary supplements Z78.9 **patient advised to stop taking the supplements as the herbal components may interact with his current medications      **please refer to HPI for status of conditions    *at least 40 minutes was spent with the patient, more than half of which was spent on counseling on his multiple medical concerns      Patient Instructions   Follow up with physical therapy.    Take Adderall XR in the morning and the immediate-release 10mg Adderall as needed in the afternoon.    Take 75mg of Zoloft once a day for 1 week, then 100mg once a day.    Call doctor if your attention deficit, depression, anxiety persist/worsens, or if you develop new symptoms or side effects from the medications.    Follow up in 1 month.

## 2017-07-21 NOTE — PATIENT INSTRUCTIONS
Follow up with physical therapy.    Take Adderall XR in the morning and the immediate-release 10mg Adderall as needed in the afternoon.    Take 75mg of Zoloft once a day for 1 week, then 100mg once a day.    Call doctor if your attention deficit, depression, anxiety persist/worsens, or if you develop new symptoms or side effects from the medications.    Follow up in 1 month.

## 2017-07-21 NOTE — MR AVS SNAPSHOT
After Visit Summary   7/21/2017    Manjula Dorman    MRN: [id number]           Patient Information     Date Of Birth          1985        Visit Information        Provider Department      7/21/2017 10:00 AM Ray Johnson MD [facility] [place]        Today's Diagnoses     Attention deficit hyperactivity disorder (ADHD), unspecified ADHD type    -  1    Depression with anxiety        Chronic left shoulder pain        Chronic left-sided low back pain with left-sided sciatica        Urination disorder          Care Instructions    Follow up with physical therapy.    Take Adderall XR in the morning and the immediate-release 10mg Adderall as needed in the afternoon.    Take 75mg of Zoloft once a day for 1 week, then 100mg once a day.    Call doctor if your attention deficit, depression, anxiety persist/worsens, or if you develop new symptoms or side effects from the medications.    Follow up in 1 month.            Follow-ups after your visit        Additional Services     [facility]       **This order will print in the [facility] Scheduling Office**    Physical Therapy, Hand Therapy and Chiropractic Care are available through:    *[facility]  *[facility]  *[facility]    Call one number to schedule at any of the above locations: [phone].    Your provider has referred you to: Physical Therapy at [facility] or [facility]    Indication/Reason for Referral: Low Back Pain, Shoulder Pain and Urination Problem (Urology suggests pelvic exercises)  Onset of Illness:   Therapy Orders: Evaluate and Treat  Special Programs: as recommended by therapist    Special Request:     Monisha Bearden      Additional Comments for the Therapist or Chiropractor:     Please be aware that coverage of these services is subject to the terms and limitations of your health insurance plan.  Call member services at your health plan with any  benefit or coverage questions.      Please bring the following to your appointment:    *Your personal calendar for scheduling future appointments  *Comfortable clothing                  Who to contact     If you have questions or need follow up information about today's clinic visit or your schedule please contact [facility] directly at [phone].  Normal or non-critical lab and imaging results will be communicated to you by MyChart, letter or phone within 4 business days after the clinic has received the results. If you do not hear from us within 7 days, please contact the clinic through [employer] or phone. If you have a critical or abnormal lab result, we will notify you by phone as soon as possible.  Submit refill requests through [facility] or call your pharmacy and they will forward the refill request to us. Please allow 3 business days for your refill to be completed.          Additional Information About Your Visit        MyChart Information     [facility] gives you secure access to your electronic health record. If you see a primary care provider, you can also send messages to your care team and make appointments. If you have questions, please call your primary care clinic.  If you do not have a primary care provider, please call [phone] and they will assist you.        Care EveryWhere ID     This is your Care EveryWhere ID. This could be used by other organizations to access your [facility] medical records  [id number]         Blood Pressure from Last 3 Encounters:   04/04/17 127/81   03/09/17 125/79   01/23/17 126/79    Weight from Last 3 Encounters:   04/04/17 200 lb (90.7 kg)   03/09/17 201 lb (91.2 kg)   01/23/17 210 lb (95.3 kg)              We Performed the Following     [facility]          Today's Medication Changes          These changes are accurate as of: 7/21/17 11:17 AM.  If you have any questions, ask your nurse or doctor.               These medicines  have changed or have updated prescriptions.        Dose/Directions    * amphetamine-dextroamphetamine 10 MG per tablet   Commonly known as:  ADDERALL   This may have changed:  Another medication with the same name was added. Make sure you understand how and when to take each.   Used for:  Attention deficit hyperactivity disorder (ADHD), unspecified ADHD type        Dose:  10 mg   Take 1 tablet (10 mg) by mouth 2 times daily   Quantity:  60 tablet   Refills:  0       * amphetamine-dextroamphetamine 20 MG per 24 hr capsule   Commonly known as:  ADDERALL XR   This may have changed:  You were already taking a medication with the same name, and this prescription was added. Make sure you understand how and when to take each.   Used for:  Attention deficit hyperactivity disorder (ADHD), unspecified ADHD type        Dose:  20 mg   Take 1 capsule (20 mg) by mouth daily   Quantity:  30 capsule   Refills:  0       * sertraline 50 MG tablet   Commonly known as:  ZOLOFT   This may have changed:  Another medication with the same name was added. Make sure you understand how and when to take each.   Used for:  Anxiety        Dose:  50 mg   Take 1 tablet (50 mg) by mouth daily   Quantity:  30 tablet   Refills:  8       * sertraline 100 MG tablet   Commonly known as:  ZOLOFT   This may have changed:  You were already taking a medication with the same name, and this prescription was added. Make sure you understand how and when to take each.   Used for:  Depression with anxiety        Dose:  100 mg   Take 1 tablet (100 mg) by mouth daily   Quantity:  30 tablet   Refills:  1       * Notice:  This list has 4 medication(s) that are the same as other medications prescribed for you. Read the directions carefully, and ask your doctor or other care provider to review them with you.      Stop taking these medicines if you haven't already. Please contact your care team if you have questions.     doxycycline 100 MG capsule   Commonly known as:   DARYAMYCIN                Where to get your medicines      [facility] - [place], [place] - [address] AT [facility] [address]  [address], [facility] [address]     Phone:  [phone]     sertraline 100 MG tablet         Some of these will need a paper prescription and others can be bought over the counter.  Ask your nurse if you have questions.     Bring a paper prescription for each of these medications     amphetamine-dextroamphetamine 20 MG per 24 hr capsule                Primary Care Provider Office Phone # Fax #    Ray Erik Johnson [phone]       [facility] [address]  [facility] [address]        Equal Access to Services     INNA SEALS : Hadii aad ku hadasho Soomaali, waaxda luqadaha, qaybta kaalmada adeegyada, beau sunin hayjarret young . So [facility] [phone].    ATENCIÓN: Si habla español, tiene a gil disposición servicios gratuitos de asistencia lingüística. Llame al [phone].    We comply with applicable federal civil rights laws and [place] laws. We do not discriminate on the basis of race, color, national origin, age, disability sex, sexual orientation or gender identity.            Thank you!     Thank you for choosing [facility]  for your care. Our goal is always to provide you with excellent care. Hearing back from our patients is one way we can continue to improve our services. Please take a few minutes to complete the written survey that you may receive in the mail after your visit with us. Thank you!             Your Updated Medication List - Protect others around you: Learn how to safely use, store and throw away your medicines at www.disposemymeds.org.          This list is accurate as of: 7/21/17 11:17 AM.  Always use your most recent med list.                   Brand Name Dispense Instructions for use Diagnosis    *  amphetamine-dextroamphetamine 10 MG per tablet    ADDERALL    60 tablet    Take 1 tablet (10 mg) by mouth 2 times daily    Attention deficit hyperactivity disorder (ADHD), unspecified ADHD type       * amphetamine-dextroamphetamine 20 MG per 24 hr capsule    ADDERALL XR    30 capsule    Take 1 capsule (20 mg) by mouth daily    Attention deficit hyperactivity disorder (ADHD), unspecified ADHD type       ibuprofen 800 MG tablet    ADVIL/MOTRIN    60 tablet    Take 1 tablet (800 mg) by mouth every 8 hours as needed for moderate pain    Viral syndrome       * sertraline 50 MG tablet    ZOLOFT    30 tablet    Take 1 tablet (50 mg) by mouth daily    Anxiety       * sertraline 100 MG tablet    ZOLOFT    30 tablet    Take 1 tablet (100 mg) by mouth daily    Depression with anxiety       * Notice:  This list has 4 medication(s) that are the same as other medications prescribed for you. Read the directions carefully, and ask your doctor or other care provider to review them with you.

## 2017-07-22 ASSESSMENT — ANXIETY QUESTIONNAIRES: GAD7 TOTAL SCORE: 14

## 2017-07-22 ASSESSMENT — PATIENT HEALTH QUESTIONNAIRE - PHQ9: SUM OF ALL RESPONSES TO PHQ QUESTIONS 1-9: 12

## 2017-07-28 ENCOUNTER — TELEPHONE (OUTPATIENT)
Dept: FAMILY MEDICINE | Facility: CLINIC | Age: 32
End: 2017-07-28

## 2017-07-28 NOTE — TELEPHONE ENCOUNTER
I called [employer] [facility] and completed a PA over the phone for the d-amphetamine Er 20 mg capsules. This strength has now been approved and approval will be good until July of 2020. I called the pharmacy to let them know.    Pato Pitts CMA

## 2017-08-08 ENCOUNTER — MYC MEDICAL ADVICE (OUTPATIENT)
Dept: FAMILY MEDICINE | Facility: CLINIC | Age: 32
End: 2017-08-08

## 2017-08-08 ENCOUNTER — E-VISIT (OUTPATIENT)
Dept: FAMILY MEDICINE | Facility: CLINIC | Age: 32
End: 2017-08-08
Payer: COMMERCIAL

## 2017-08-08 DIAGNOSIS — R21 RASH: Primary | ICD-10-CM

## 2017-08-08 PROCEDURE — 99444 ZZC PHYSICIAN ONLINE EVALUATION & MANAGEMENT SERVICE: CPT | Performed by: INTERNAL MEDICINE

## 2017-08-08 ASSESSMENT — ENCOUNTER SYMPTOMS
LOSS OF CONSCIOUSNESS: 0
INSOMNIA: 0
BACK PAIN: 1
FREQUENCY: 0
HEMATURIA: 0
FOCAL WEAKNESS: 0
TREMORS: 0
PALPITATIONS: 0
ABDOMINAL PAIN: 1
HALLUCINATIONS: 0
SENSORY CHANGE: 0
NERVOUS/ANXIOUS: 1
VOMITING: 0
HEADACHES: 0
DEPRESSION: 1
NAUSEA: 0
FLANK PAIN: 0
DYSURIA: 0

## 2017-08-08 ASSESSMENT — LIFESTYLE VARIABLES: SUBSTANCE_ABUSE: 0

## 2017-08-08 NOTE — TELEPHONE ENCOUNTER
Routing to PCP regarding treatment of ringworm  Patient reporting spread of ringworm.     Please advise.     Stormy Junior RN

## 2017-08-10 ENCOUNTER — MYC MEDICAL ADVICE (OUTPATIENT)
Dept: FAMILY MEDICINE | Facility: CLINIC | Age: 32
End: 2017-08-10

## 2017-08-10 NOTE — TELEPHONE ENCOUNTER
I will need to examine the rash.  I cannot go by another provider's diagnosis except if that provider is a dermatologist.

## 2017-08-10 NOTE — TELEPHONE ENCOUNTER
Patient is stating in his message he has a clinical DX for ringworm via his work [employer] service - is it possible for him to get a prescription for treatment vs OTC. Thanks  Yennifer Hi- [facility]

## 2017-08-23 ENCOUNTER — MYC REFILL (OUTPATIENT)
Dept: FAMILY MEDICINE | Facility: CLINIC | Age: 32
End: 2017-08-23

## 2017-08-23 DIAGNOSIS — F90.9 ATTENTION DEFICIT HYPERACTIVITY DISORDER (ADHD), UNSPECIFIED ADHD TYPE: ICD-10-CM

## 2017-08-23 RX ORDER — DEXTROAMPHETAMINE SACCHARATE, AMPHETAMINE ASPARTATE MONOHYDRATE, DEXTROAMPHETAMINE SULFATE AND AMPHETAMINE SULFATE 5; 5; 5; 5 MG/1; MG/1; MG/1; MG/1
20 CAPSULE, EXTENDED RELEASE ORAL DAILY
Qty: 30 CAPSULE | Refills: 0 | Status: SHIPPED | OUTPATIENT
Start: 2017-08-23 | End: 2017-09-27

## 2017-08-23 NOTE — TELEPHONE ENCOUNTER
Message from [employer]:  Original authorizing provider: MD Manjula Grajeda would like a refill of the following medications:  amphetamine-dextroamphetamine (ADDERALL XR) 20 MG per 24 hr capsule [Ray Johnson MD]    Preferred pharmacy: [facility] [address] [place] [address] AVE [address] AT [facility] [address]    Comment:  Hello - I need to refill my script for Adderall XR. Are you able to submit directly to the pharmacy? Thank you! -Manjula

## 2017-08-23 NOTE — TELEPHONE ENCOUNTER
Routing to Team Coordinators:  Please locate approved Adderall script approved by Dr. Johnson.   Please call patient letting him know that script can either be picked up at front or taken to pharmacy next door.     Stormy Junior RN

## 2017-08-23 NOTE — TELEPHONE ENCOUNTER
Pending Prescriptions:                       Disp   Refills    amphetamine-dextroamphetamine (ADDERALL X*30 cap*0            Sig: Take 1 capsule (20 mg) by mouth daily          Last Written Prescription Date:  07/21/2017  Last Fill Quantity: 30,   # refills: 0  Last Office Visit with [facility], [facility] or [facility] prescribing provider: 07/21/2017  Future Office visit:       Routing refill request to provider for review/approval because:  Drug not on the [facility], [facility] or [facility] refill protocol or controlled substance

## 2017-09-27 ENCOUNTER — MYC REFILL (OUTPATIENT)
Dept: FAMILY MEDICINE | Facility: CLINIC | Age: 32
End: 2017-09-27

## 2017-09-27 DIAGNOSIS — F41.8 DEPRESSION WITH ANXIETY: ICD-10-CM

## 2017-09-27 DIAGNOSIS — F90.9 ATTENTION DEFICIT HYPERACTIVITY DISORDER (ADHD), UNSPECIFIED ADHD TYPE: ICD-10-CM

## 2017-09-27 NOTE — TELEPHONE ENCOUNTER
Message from [employer]:  Original authorizing provider: MD Manjula Grajeda would like a refill of the following medications:  sertraline (ZOLOFT) 100 MG tablet [Ray Johnson MD]  amphetamine-dextroamphetamine (ADDERALL XR) 20 MG per 24 hr capsule [Ray Johnson MD]    Preferred pharmacy: [facility] [address] [place] [address] AVE [address] AT [facility] & [facility]    Comment:  Hello - I need to refill the checked prescriptions. As an update for Dr. Johnson, I have been taking 20 MG Adderall XR and Zoloft 100MG once a day, and the 10MG Adderall in the afternoon to assist with additional focus as he recommended. The regiment seems to be working right now. Thank you!    Medication renewals requested in this message routed to other providers:  amphetamine-dextroamphetamine (ADDERALL) 10 MG per tablet [Elsa Ontiveros, APRN CNP]

## 2017-09-27 NOTE — TELEPHONE ENCOUNTER
Message from [employer]:  Original authorizing provider: NAJMA Edmond CNP ROSADaryn Dorman would like a refill of the following medications:  amphetamine-dextroamphetamine (ADDERALL) 10 MG per tablet [NAJMA Edmond CNP]    Preferred pharmacy: [facility] [address] [place] [address] AT [facility] & [address]    Comment:  Hello - I need to refill the checked prescriptions. As an update for Dr. Johnson, I have been taking 20 MG Adderall XR and Zoloft 100MG once a day, and the 10MG Adderall in the afternoon to assist with additional focus as he recommended. The regiment seems to be working right now. Thank you!    Medication renewals requested in this message routed to other providers:  sertraline (ZOLOFT) 100 MG tablet [Ray Johnson MD]  amphetamine-dextroamphetamine (ADDERALL XR) 20 MG per 24 hr capsule [Ray Johnson MD]

## 2017-09-28 ENCOUNTER — MYC MEDICAL ADVICE (OUTPATIENT)
Dept: FAMILY MEDICINE | Facility: CLINIC | Age: 32
End: 2017-09-28

## 2017-09-28 RX ORDER — SERTRALINE HYDROCHLORIDE 100 MG/1
100 TABLET, FILM COATED ORAL DAILY
Qty: 90 TABLET | Refills: 1 | Status: SHIPPED | OUTPATIENT
Start: 2017-09-28 | End: 2017-10-23

## 2017-09-28 RX ORDER — DEXTROAMPHETAMINE SACCHARATE, AMPHETAMINE ASPARTATE MONOHYDRATE, DEXTROAMPHETAMINE SULFATE AND AMPHETAMINE SULFATE 5; 5; 5; 5 MG/1; MG/1; MG/1; MG/1
20 CAPSULE, EXTENDED RELEASE ORAL DAILY
Qty: 30 CAPSULE | Refills: 0 | Status: SHIPPED | OUTPATIENT
Start: 2017-09-28 | End: 2017-10-23

## 2017-09-28 RX ORDER — DEXTROAMPHETAMINE SACCHARATE, AMPHETAMINE ASPARTATE, DEXTROAMPHETAMINE SULFATE AND AMPHETAMINE SULFATE 2.5; 2.5; 2.5; 2.5 MG/1; MG/1; MG/1; MG/1
10 TABLET ORAL 2 TIMES DAILY
Qty: 60 TABLET | Refills: 0 | Status: SHIPPED | OUTPATIENT
Start: 2017-09-28 | End: 2017-10-23

## 2017-09-28 ASSESSMENT — PATIENT HEALTH QUESTIONNAIRE - PHQ9
10. IF YOU CHECKED OFF ANY PROBLEMS, HOW DIFFICULT HAVE THESE PROBLEMS MADE IT FOR YOU TO DO YOUR WORK, TAKE CARE OF THINGS AT HOME, OR GET ALONG WITH OTHER PEOPLE: SOMEWHAT DIFFICULT
SUM OF ALL RESPONSES TO PHQ QUESTIONS 1-9: 6
SUM OF ALL RESPONSES TO PHQ QUESTIONS 1-9: 6

## 2017-09-28 NOTE — TELEPHONE ENCOUNTER
Sent MyChart regarding Zoloft, dose increased back in July and was supposed to f/u in a month  Will wait for pt response.

## 2017-09-28 NOTE — TELEPHONE ENCOUNTER
Dr. Johnson,  Please see below [employer] message, f/u regarding sertraline increased dose  Thank you,  Kassy Wilson RN

## 2017-09-28 NOTE — TELEPHONE ENCOUNTER
amphetamine-dextroamphetamine (ADDERALL) 10 MG per tablet        Last Written Prescription Date: 7/11/2017  Last Fill Quantity: 60,  # refills: 0   Last Office Visit with [facility], [facility] or [facility] prescribing provider: 7/21/2017

## 2017-09-28 NOTE — TELEPHONE ENCOUNTER
Dr. Johnson  Pt requesting refill on the sertraline 100 mg, increased in July and was supposed to f/u in a month  Sent MyChart and pt updated PHQ9, went from 12 to 6  Ok to refill for 6 months or needs evisit/phone visit first?  The Adderall refill prep will be addressed by the [facility] staff  Thank you,    Kassy Wilson RN  [place]

## 2017-09-28 NOTE — TELEPHONE ENCOUNTER
sertraline (ZOLOFT) 100 MG tablet     Last Written Prescription Date: 7/21/2017  Last Fill Quantity: 30, # refills: 1  Last Office Visit with [facility] primary care provider:  7/21/2017        Last PHQ-9 score on record=   PHQ-9 SCORE 7/21/2017   Total Score -   Total Score 12               amphetamine-dextroamphetamine (ADDERALL XR) 20 MG per 24 hr capsule        Last Written Prescription Date: 8/23/2017  Last Fill Quantity: 30,  # refills: 0   Last Office Visit with [facility], [facility] or [facility] prescribing provider: 7/21/2017

## 2017-09-29 ASSESSMENT — PATIENT HEALTH QUESTIONNAIRE - PHQ9: SUM OF ALL RESPONSES TO PHQ QUESTIONS 1-9: 6

## 2017-10-04 NOTE — TELEPHONE ENCOUNTER
I feel it would be better to schedule a clinic visit for further evaluation/discussion since we may need to modify his current regimen.  Cancel E-visit.

## 2017-10-04 NOTE — TELEPHONE ENCOUNTER
[employer] message sent to patient.     Routing to TC to make sure an E-visit isn't scheduled.     Thank you,  Bhumi GUZMAN RN,BSN

## 2017-10-23 ENCOUNTER — MYC REFILL (OUTPATIENT)
Dept: FAMILY MEDICINE | Facility: CLINIC | Age: 32
End: 2017-10-23

## 2017-10-23 ENCOUNTER — MYC MEDICAL ADVICE (OUTPATIENT)
Dept: FAMILY MEDICINE | Facility: CLINIC | Age: 32
End: 2017-10-23

## 2017-10-23 DIAGNOSIS — F41.8 DEPRESSION WITH ANXIETY: ICD-10-CM

## 2017-10-23 DIAGNOSIS — F90.9 ATTENTION DEFICIT HYPERACTIVITY DISORDER (ADHD), UNSPECIFIED ADHD TYPE: ICD-10-CM

## 2017-10-23 NOTE — TELEPHONE ENCOUNTER
amphetamine-dextroamphetamine (ADDERALL XR) 20 MG per 24 hr capsule; amphetamine-dextroamphetamine (ADDERALL) 10 MG per tablet        Last Written Prescription Date:  9/28/2017  Last Fill Quantity: 60,   # refills: 0  Future Office visit:       Routing refill request to provider for review/approval because:  Drug not on the [facility], [facility] or [facility] refill protocol or controlled substance

## 2017-10-23 NOTE — TELEPHONE ENCOUNTER
Message from [employer]:  Original authorizing provider: NAJMA Rowell CNP would like a refill of the following medications:  sertraline (ZOLOFT) 100 MG tablet [NAJMA Rowell CNP]  amphetamine-dextroamphetamine (ADDERALL XR) 20 MG per 24 hr capsule [NAJMA Rowell CNP]  amphetamine-dextroamphetamine (ADDERALL) 10 MG per tablet [NAJMA Rowell CNP]    Preferred pharmacy: [facility] [address] [place] [address] AVE S AT [facility] & [facility]    Comment:  Hello - I need to refill the selected prescriptions. I am planning to schedule an appointment soon with Dr. Johnson to go over the impact of the scripts and my health. Thank you!

## 2017-10-24 NOTE — TELEPHONE ENCOUNTER
Patient is way overdue for his follow up and we have allowed several refills with requests for follow up.    Please call patient and inform him to schedule an appointment, and once that is scheduled I will refill the medications.    Please route this message back to me once appointment scheduled.

## 2017-10-25 RX ORDER — DEXTROAMPHETAMINE SACCHARATE, AMPHETAMINE ASPARTATE, DEXTROAMPHETAMINE SULFATE AND AMPHETAMINE SULFATE 2.5; 2.5; 2.5; 2.5 MG/1; MG/1; MG/1; MG/1
10 TABLET ORAL 2 TIMES DAILY
Qty: 60 TABLET | Refills: 0 | Status: SHIPPED | OUTPATIENT
Start: 2017-10-25 | End: 2017-10-27

## 2017-10-25 RX ORDER — DEXTROAMPHETAMINE SACCHARATE, AMPHETAMINE ASPARTATE MONOHYDRATE, DEXTROAMPHETAMINE SULFATE AND AMPHETAMINE SULFATE 5; 5; 5; 5 MG/1; MG/1; MG/1; MG/1
20 CAPSULE, EXTENDED RELEASE ORAL DAILY
Qty: 30 CAPSULE | Refills: 0 | Status: SHIPPED | OUTPATIENT
Start: 2017-10-25 | End: 2017-10-27 | Stop reason: DRUGHIGH

## 2017-10-25 RX ORDER — SERTRALINE HYDROCHLORIDE 100 MG/1
100 TABLET, FILM COATED ORAL DAILY
Qty: 90 TABLET | Refills: 1 | Status: SHIPPED | OUTPATIENT
Start: 2017-10-25 | End: 2017-10-27 | Stop reason: DRUGHIGH

## 2017-10-26 ENCOUNTER — MYC MEDICAL ADVICE (OUTPATIENT)
Dept: FAMILY MEDICINE | Facility: CLINIC | Age: 32
End: 2017-10-26

## 2017-10-27 ENCOUNTER — OFFICE VISIT (OUTPATIENT)
Dept: FAMILY MEDICINE | Facility: CLINIC | Age: 32
End: 2017-10-27
Payer: COMMERCIAL

## 2017-10-27 VITALS
TEMPERATURE: 97.6 F | WEIGHT: 201 LBS | HEART RATE: 85 BPM | OXYGEN SATURATION: 96 % | DIASTOLIC BLOOD PRESSURE: 70 MMHG | HEIGHT: 70 IN | BODY MASS INDEX: 28.77 KG/M2 | SYSTOLIC BLOOD PRESSURE: 121 MMHG

## 2017-10-27 DIAGNOSIS — F90.9 ATTENTION DEFICIT HYPERACTIVITY DISORDER (ADHD), UNSPECIFIED ADHD TYPE: ICD-10-CM

## 2017-10-27 DIAGNOSIS — L23.9 ALLERGIC CONTACT DERMATITIS, UNSPECIFIED TRIGGER: ICD-10-CM

## 2017-10-27 DIAGNOSIS — F41.8 DEPRESSION WITH ANXIETY: Primary | ICD-10-CM

## 2017-10-27 PROCEDURE — 99214 OFFICE O/P EST MOD 30 MIN: CPT | Performed by: INTERNAL MEDICINE

## 2017-10-27 RX ORDER — DEXTROAMPHETAMINE SACCHARATE, AMPHETAMINE ASPARTATE MONOHYDRATE, DEXTROAMPHETAMINE SULFATE AND AMPHETAMINE SULFATE 7.5; 7.5; 7.5; 7.5 MG/1; MG/1; MG/1; MG/1
30 CAPSULE, EXTENDED RELEASE ORAL DAILY
Qty: 30 CAPSULE | Refills: 0 | Status: SHIPPED | OUTPATIENT
Start: 2017-10-27 | End: 2017-11-27

## 2017-10-27 RX ORDER — HYDROXYZINE HYDROCHLORIDE 25 MG/1
25-50 TABLET, FILM COATED ORAL
Qty: 20 TABLET | Refills: 1 | Status: SHIPPED | OUTPATIENT
Start: 2017-10-27 | End: 2018-04-24

## 2017-10-27 RX ORDER — DEXTROAMPHETAMINE SACCHARATE, AMPHETAMINE ASPARTATE, DEXTROAMPHETAMINE SULFATE AND AMPHETAMINE SULFATE 2.5; 2.5; 2.5; 2.5 MG/1; MG/1; MG/1; MG/1
10 TABLET ORAL DAILY
Qty: 30 TABLET | Refills: 0 | Status: SHIPPED | OUTPATIENT
Start: 2017-10-27 | End: 2018-01-31

## 2017-10-27 RX ORDER — METHYLPREDNISOLONE 4 MG
TABLET, DOSE PACK ORAL
Qty: 21 TABLET | Refills: 0 | Status: SHIPPED | OUTPATIENT
Start: 2017-10-27 | End: 2018-04-24

## 2017-10-27 ASSESSMENT — ANXIETY QUESTIONNAIRES
7. FEELING AFRAID AS IF SOMETHING AWFUL MIGHT HAPPEN: SEVERAL DAYS
IF YOU CHECKED OFF ANY PROBLEMS ON THIS QUESTIONNAIRE, HOW DIFFICULT HAVE THESE PROBLEMS MADE IT FOR YOU TO DO YOUR WORK, TAKE CARE OF THINGS AT HOME, OR GET ALONG WITH OTHER PEOPLE: SOMEWHAT DIFFICULT
5. BEING SO RESTLESS THAT IT IS HARD TO SIT STILL: MORE THAN HALF THE DAYS
3. WORRYING TOO MUCH ABOUT DIFFERENT THINGS: SEVERAL DAYS
1. FEELING NERVOUS, ANXIOUS, OR ON EDGE: SEVERAL DAYS
6. BECOMING EASILY ANNOYED OR IRRITABLE: SEVERAL DAYS
GAD7 TOTAL SCORE: 9
2. NOT BEING ABLE TO STOP OR CONTROL WORRYING: SEVERAL DAYS

## 2017-10-27 ASSESSMENT — PATIENT HEALTH QUESTIONNAIRE - PHQ9
SUM OF ALL RESPONSES TO PHQ QUESTIONS 1-9: 11
5. POOR APPETITE OR OVEREATING: MORE THAN HALF THE DAYS

## 2017-10-27 NOTE — PATIENT INSTRUCTIONS
Call doctor if your depression/anxiety worsens, or if your sexual dysfunction symptoms persist.    Follow up in 2 months.

## 2017-10-27 NOTE — NURSING NOTE
"Chief Complaint   Patient presents with     Depression     & anxiety med check      Attention Deficit Disorder     med check      Rash     on legs icreasing over past 6 months.  calves moving upward.   Itches.  Used Lotrimin.  Did a Tel Visit and rx'ed  Ketoconazole 2 % cream , has not started yet.        Initial /70 (BP Location: Left arm, Patient Position: Chair, Cuff Size: Adult Regular)  Pulse 85  Temp 97.6  F (36.4  C) (Oral)  Ht 5' 10\" (1.778 m)  Wt 201 lb (91.2 kg)  SpO2 96%  BMI 28.84 kg/m2 Estimated body mass index is 28.84 kg/(m^2) as calculated from the following:    Height as of this encounter: 5' 10\" (1.778 m).    Weight as of this encounter: 201 lb (91.2 kg).  Medication Reconciliation: complete  "

## 2017-10-27 NOTE — PROGRESS NOTES
HPI      SUBJECTIVE:   Manjula Dorman is a 32 year old male who presents to clinic today for the following health issues:      Depression and Anxiety Follow-Up    Status since last visit: Improved but frustrated with slower processing.  Restless, fidgets left leg, tends to be forgetful but is more organized.  Decreased interest in sex and other activities.  Wife miscarried in Sept, Father passed away in May. Has started to increase running.      I last saw the patient at the clinic on 7/21/2017.  During that clinic visit, we discussed about his depression and anxiety for which he is on Zoloft 50 mg daily. He said that his depression and anxiety have improved after starting the medication. His PHQ9 and HOMA 7 score that day reflected that he still had moderate anxiety and depression so the patient was instructed to gradually increase his Zoloft to 100 mg daily. Tolerating the medication well but developed side effects of increased apathy and sexual dysfunction.    During his previous clinic visit, we also modified his Adderall regimen from 10 mg twice a day to Adderall XR 20 mg once daily in the morning and as-needed regular-release Adderall 10 mg in the afternoon. This regimen is working fairly well, but the patient is wishing that the morning dose could last longer into the early afternoon.    Also developed pruritic rash after working outdoors.      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   Eyes: Negative for blurred vision.   Cardiovascular: Negative for chest pain and palpitations.   Gastrointestinal: Negative for abdominal pain, constipation, diarrhea, nausea and vomiting.   Musculoskeletal: Negative for myalgias.   Skin: Positive for itching and rash.   Neurological: Negative for dizziness, tremors and headaches.   Psychiatric/Behavioral: Negative for depression, hallucinations and suicidal ideas. The patient is nervous/anxious. The patient does not have insomnia.        /70 (BP Location: Left arm, Patient Position: Chair, Cuff Size: Adult Regular)  Pulse 85  Temp 97.6  F (36.4  C) (Oral)  Ht 5' 10\" (1.778 m)  Wt 201 lb (91.2 kg)  SpO2 96%  BMI 28.84 kg/m2      Physical Exam   Constitutional: He is oriented to person, place, and time. No distress.   Neck: No thyromegaly present.   Cardiovascular: Normal rate, regular rhythm and normal heart sounds.    Pulmonary/Chest: Effort normal and breath sounds normal. No respiratory distress.   Abdominal: Soft. There is no tenderness.   Neurological: He is alert and oriented to person, place, and time. Coordination normal. GCS score is 15.   Skin: Rash (hives) noted.   Psychiatric: Mood and affect normal.   Vitals reviewed.        ICD-10-CM    1. Depression with anxiety F41.8 sertraline (ZOLOFT) 50 MG tablet    Decrease Zoloft dose to 75 mg daily due to side effects with 100 mg dose   2. Attention deficit hyperactivity disorder (ADHD), unspecified ADHD type F90.9 amphetamine-dextroamphetamine (ADDERALL) 10 MG per tablet     amphetamine-dextroamphetamine (ADDERALL XR) 30 MG per 24 hr capsule    Increase Adderall XR dose to 30 mg from 20 mg   3. Allergic contact dermatitis, unspecified trigger L23.9 methylPREDNISolone (MEDROL DOSEPAK) 4 MG tablet     hydrOXYzine (ATARAX) 25 MG tablet     **please refer to HPI for status of " conditions      Patient Instructions   Call doctor if your depression/anxiety worsens, or if your sexual dysfunction symptoms persist.    Follow up in 2 months.

## 2017-10-28 ASSESSMENT — ANXIETY QUESTIONNAIRES: GAD7 TOTAL SCORE: 9

## 2017-11-01 ENCOUNTER — MYC MEDICAL ADVICE (OUTPATIENT)
Dept: FAMILY MEDICINE | Facility: CLINIC | Age: 32
End: 2017-11-01

## 2017-11-01 NOTE — TELEPHONE ENCOUNTER
Patient did receive a printed prescription on his visit. What he is referring to is that the 30 mg Adderall needed a prior authorization to be approved. Please follow-up with prior auth team regarding the progress

## 2017-11-01 NOTE — TELEPHONE ENCOUNTER
TO PCP - noted 30mg and 10mg Adderall Rxs were printed 10/27/17 but per below message pt stating did not receive any Rxs.   Was pt given printed Rxs at last OV 10/27/17?   Also, per below message rash is not completely gone    Please advise.   Junie MARSHALL RN

## 2017-11-02 NOTE — TELEPHONE ENCOUNTER
PA has been approved for the Adderall ER from 10/3/17 to 11/2/18. I let the patient know via Soundwavet

## 2017-11-20 ENCOUNTER — MYC MEDICAL ADVICE (OUTPATIENT)
Dept: FAMILY MEDICINE | Facility: CLINIC | Age: 32
End: 2017-11-20

## 2017-11-27 ENCOUNTER — MYC REFILL (OUTPATIENT)
Dept: FAMILY MEDICINE | Facility: CLINIC | Age: 32
End: 2017-11-27

## 2017-11-27 DIAGNOSIS — F90.9 ATTENTION DEFICIT HYPERACTIVITY DISORDER (ADHD), UNSPECIFIED ADHD TYPE: ICD-10-CM

## 2017-11-27 NOTE — TELEPHONE ENCOUNTER
Message from Serveront:  Original authorizing provider: MD Manjula Grajeda would like a refill of the following medications:  amphetamine-dextroamphetamine (ADDERALL XR) 30 MG per 24 hr capsule [Ray Johnson MD]    Preferred pharmacy: [facility] [address] [place] [address] AVE [address] AT [facility] [address]    Comment:  Hello - I need to refill my Adderall XR 30mg. Please advise when the script is ready for . Thank you!

## 2017-11-28 RX ORDER — DEXTROAMPHETAMINE SACCHARATE, AMPHETAMINE ASPARTATE MONOHYDRATE, DEXTROAMPHETAMINE SULFATE AND AMPHETAMINE SULFATE 7.5; 7.5; 7.5; 7.5 MG/1; MG/1; MG/1; MG/1
30 CAPSULE, EXTENDED RELEASE ORAL DAILY
Qty: 30 CAPSULE | Refills: 0 | Status: SHIPPED | OUTPATIENT
Start: 2017-11-28 | End: 2017-12-20

## 2017-11-28 ASSESSMENT — ENCOUNTER SYMPTOMS
WEIGHT LOSS: 0
HEADACHES: 0
CONSTIPATION: 0
DIZZINESS: 0
BLURRED VISION: 0
DIARRHEA: 0
NAUSEA: 0
TREMORS: 0
PALPITATIONS: 0
VOMITING: 0
NERVOUS/ANXIOUS: 1
DEPRESSION: 0
HALLUCINATIONS: 0
ABDOMINAL PAIN: 0
MYALGIAS: 0
INSOMNIA: 0

## 2017-12-20 ENCOUNTER — MYC REFILL (OUTPATIENT)
Dept: FAMILY MEDICINE | Facility: CLINIC | Age: 32
End: 2017-12-20

## 2017-12-20 DIAGNOSIS — F90.9 ATTENTION DEFICIT HYPERACTIVITY DISORDER (ADHD), UNSPECIFIED ADHD TYPE: ICD-10-CM

## 2017-12-20 RX ORDER — DEXTROAMPHETAMINE SACCHARATE, AMPHETAMINE ASPARTATE MONOHYDRATE, DEXTROAMPHETAMINE SULFATE AND AMPHETAMINE SULFATE 7.5; 7.5; 7.5; 7.5 MG/1; MG/1; MG/1; MG/1
30 CAPSULE, EXTENDED RELEASE ORAL DAILY
Qty: 30 CAPSULE | Refills: 0 | Status: SHIPPED | OUTPATIENT
Start: 2017-12-20 | End: 2018-01-31

## 2017-12-20 NOTE — TELEPHONE ENCOUNTER
Message from Viewabillt:  Original authorizing provider: Ray Johnson MD    Manjula ARRIOLA Dandy would like a refill of the following medications:  amphetamine-dextroamphetamine (ADDERALL XR) 30 MG per 24 hr capsule [Ray Johnson MD]    Preferred pharmacy: [facility] [address] [place] [address] AVE S AT [facility] & [facility]    Comment:  Hello - I am going to be out of town for the holidays, and I am wondering what I can do to refill my Adderall XR next week. As I won't be here next week to  the script to take it to the pharmacy, would I be able to get a future dated script, and then fill it at a pharmacy next week near where I will be staying? If this won't work, I am open to other suggestions you have so I don't run out of medication. Thank you! -Manjula

## 2017-12-20 NOTE — TELEPHONE ENCOUNTER
Adderall      Last Written Prescription Date: 11/28/2017  Last Fill Quantity: 30,  # refills: 0   Last Office Visit with [facility], [facility] or [facility] prescribing provider: 11/27/2017                                             Please see note below from patient.

## 2018-01-31 ENCOUNTER — MYC MEDICAL ADVICE (OUTPATIENT)
Dept: FAMILY MEDICINE | Facility: CLINIC | Age: 33
End: 2018-01-31

## 2018-01-31 ENCOUNTER — MYC REFILL (OUTPATIENT)
Dept: FAMILY MEDICINE | Facility: CLINIC | Age: 33
End: 2018-01-31

## 2018-01-31 DIAGNOSIS — F41.8 DEPRESSION WITH ANXIETY: ICD-10-CM

## 2018-01-31 DIAGNOSIS — R21 RASH: Primary | ICD-10-CM

## 2018-01-31 DIAGNOSIS — F90.9 ATTENTION DEFICIT HYPERACTIVITY DISORDER (ADHD), UNSPECIFIED ADHD TYPE: ICD-10-CM

## 2018-01-31 NOTE — TELEPHONE ENCOUNTER
"amphetamine-dextroamphetamine (ADDERALL XR) 30 MG per 24 hr capsule        Last Written Prescription Date:  12/20/2017  Last Fill Quantity: 30,   # refills: 0  Last Office Visit: 10/27/2017  Future Office visit:       Routing refill request to provider for review/approval because:  Drug not on the [facility], P or  Health refill protocol or controlled substance    amphetamine-dextroamphetamine (ADDERALL) 10 MG per tablet        Last Written Prescription Date:  10/27/2017  Last Fill Quantity: 30,   # refills: 0  Last Office Visit: 10/27/2017  Future Office visit:       Routing refill request to provider for review/approval because:  Drug not on the [facility], P or  Health refill protocol or controlled substance    Requested Prescriptions   Pending Prescriptions Disp Refills     amphetamine-dextroamphetamine (ADDERALL) 10 MG per tablet 30 tablet 0     Sig: Take 1 tablet (10 mg) by mouth daily Take in the afternoon as needed    There is no refill protocol information for this order        sertraline (ZOLOFT) 50 MG tablet 45 tablet 1    Last Written Prescription Date:  10/27/2017  Last Fill Quantity: 45,  # refills: 1   Last Office Visit with G provider:  10/27/2017   Future Office Visit:      Sig: Take 1.5 tablets (75 mg) by mouth daily    SSRIs Protocol Passed    1/31/2018  1:30 PM       Passed - Recent or future visit with authorizing provider    Patient had office visit in the last year or has a visit in the next 30 days with authorizing provider.  See \"Patient Info\" tab in inbasket, or \"Choose Columns\" in Meds & Orders section of the refill encounter.            Passed - Patient is age 18 or older        amphetamine-dextroamphetamine (ADDERALL XR) 30 MG per 24 hr capsule 30 capsule 0     Sig: Take 1 capsule (30 mg) by mouth daily    There is no refill protocol information for this order          "

## 2018-01-31 NOTE — TELEPHONE ENCOUNTER
Message from Teracenthart:  Original authorizing provider: MD Manjula Grajeda would like a refill of the following medications:  amphetamine-dextroamphetamine (ADDERALL) 10 MG per tablet [Ray Johnson MD]  sertraline (ZOLOFT) 50 MG tablet [Ray Johnson MD]  amphetamine-dextroamphetamine (ADDERALL XR) 30 MG per 24 hr capsule [Ray Johnson MD]    Preferred pharmacy: [facility] [address] [place] [address] AVE [address] AT [facility] & [address]    Comment:  Hello - I need to refill my scripts (Adderall and Zoloft). Thank you!

## 2018-02-01 ENCOUNTER — MYC MEDICAL ADVICE (OUTPATIENT)
Dept: FAMILY MEDICINE | Facility: CLINIC | Age: 33
End: 2018-02-01

## 2018-02-01 DIAGNOSIS — F41.8 DEPRESSION WITH ANXIETY: ICD-10-CM

## 2018-02-02 ENCOUNTER — MYC MEDICAL ADVICE (OUTPATIENT)
Dept: FAMILY MEDICINE | Facility: CLINIC | Age: 33
End: 2018-02-02

## 2018-02-02 RX ORDER — DEXTROAMPHETAMINE SACCHARATE, AMPHETAMINE ASPARTATE, DEXTROAMPHETAMINE SULFATE AND AMPHETAMINE SULFATE 2.5; 2.5; 2.5; 2.5 MG/1; MG/1; MG/1; MG/1
10 TABLET ORAL DAILY
Qty: 30 TABLET | Refills: 0 | Status: SHIPPED | OUTPATIENT
Start: 2018-02-02 | End: 2018-03-01

## 2018-02-02 RX ORDER — DULOXETIN HYDROCHLORIDE 30 MG/1
30 CAPSULE, DELAYED RELEASE ORAL 2 TIMES DAILY
Qty: 60 CAPSULE | Refills: 1 | Status: SHIPPED | OUTPATIENT
Start: 2018-02-02 | End: 2018-04-24 | Stop reason: ALTCHOICE

## 2018-02-02 RX ORDER — DEXTROAMPHETAMINE SACCHARATE, AMPHETAMINE ASPARTATE MONOHYDRATE, DEXTROAMPHETAMINE SULFATE AND AMPHETAMINE SULFATE 7.5; 7.5; 7.5; 7.5 MG/1; MG/1; MG/1; MG/1
30 CAPSULE, EXTENDED RELEASE ORAL DAILY
Qty: 30 CAPSULE | Refills: 0 | Status: SHIPPED | OUTPATIENT
Start: 2018-02-02 | End: 2018-03-01

## 2018-03-01 ENCOUNTER — MYC REFILL (OUTPATIENT)
Dept: FAMILY MEDICINE | Facility: CLINIC | Age: 33
End: 2018-03-01

## 2018-03-01 DIAGNOSIS — F90.9 ATTENTION DEFICIT HYPERACTIVITY DISORDER (ADHD), UNSPECIFIED ADHD TYPE: ICD-10-CM

## 2018-03-02 RX ORDER — DEXTROAMPHETAMINE SACCHARATE, AMPHETAMINE ASPARTATE, DEXTROAMPHETAMINE SULFATE AND AMPHETAMINE SULFATE 2.5; 2.5; 2.5; 2.5 MG/1; MG/1; MG/1; MG/1
10 TABLET ORAL DAILY
Qty: 30 TABLET | Refills: 0 | Status: SHIPPED | OUTPATIENT
Start: 2018-03-02 | End: 2018-04-24 | Stop reason: ALTCHOICE

## 2018-03-02 RX ORDER — DEXTROAMPHETAMINE SACCHARATE, AMPHETAMINE ASPARTATE, DEXTROAMPHETAMINE SULFATE AND AMPHETAMINE SULFATE 2.5; 2.5; 2.5; 2.5 MG/1; MG/1; MG/1; MG/1
10 TABLET ORAL DAILY
Qty: 30 TABLET | Refills: 0 | Status: SHIPPED | OUTPATIENT
Start: 2018-04-02 | End: 2018-04-24 | Stop reason: ALTCHOICE

## 2018-03-02 RX ORDER — DEXTROAMPHETAMINE SACCHARATE, AMPHETAMINE ASPARTATE MONOHYDRATE, DEXTROAMPHETAMINE SULFATE AND AMPHETAMINE SULFATE 7.5; 7.5; 7.5; 7.5 MG/1; MG/1; MG/1; MG/1
30 CAPSULE, EXTENDED RELEASE ORAL DAILY
Qty: 30 CAPSULE | Refills: 0 | Status: SHIPPED | OUTPATIENT
Start: 2018-03-02 | End: 2018-04-24 | Stop reason: ALTCHOICE

## 2018-03-02 RX ORDER — DEXTROAMPHETAMINE SACCHARATE, AMPHETAMINE ASPARTATE MONOHYDRATE, DEXTROAMPHETAMINE SULFATE AND AMPHETAMINE SULFATE 7.5; 7.5; 7.5; 7.5 MG/1; MG/1; MG/1; MG/1
30 CAPSULE, EXTENDED RELEASE ORAL DAILY
Qty: 30 CAPSULE | Refills: 0 | Status: SHIPPED | OUTPATIENT
Start: 2018-04-02 | End: 2018-04-24 | Stop reason: ALTCHOICE

## 2018-03-02 RX ORDER — DEXTROAMPHETAMINE SACCHARATE, AMPHETAMINE ASPARTATE, DEXTROAMPHETAMINE SULFATE AND AMPHETAMINE SULFATE 2.5; 2.5; 2.5; 2.5 MG/1; MG/1; MG/1; MG/1
10 TABLET ORAL DAILY
Qty: 30 TABLET | Refills: 0 | Status: SHIPPED | OUTPATIENT
Start: 2018-05-02 | End: 2018-04-24 | Stop reason: ALTCHOICE

## 2018-03-02 RX ORDER — DEXTROAMPHETAMINE SACCHARATE, AMPHETAMINE ASPARTATE MONOHYDRATE, DEXTROAMPHETAMINE SULFATE AND AMPHETAMINE SULFATE 7.5; 7.5; 7.5; 7.5 MG/1; MG/1; MG/1; MG/1
30 CAPSULE, EXTENDED RELEASE ORAL DAILY
Qty: 30 CAPSULE | Refills: 0 | Status: SHIPPED | OUTPATIENT
Start: 2018-05-02 | End: 2018-04-24 | Stop reason: ALTCHOICE

## 2018-03-02 NOTE — TELEPHONE ENCOUNTER
Controlled Substance Refill Request for amphetamine-dextroamphetamine (ADDERALL) 10 MG per tablet   And amphetamine-dextroamphetamine (ADDERALL XR) 30 MG per 24 hr capsule  Problem List Complete:  Yes    Last Written Prescription Date:  2/2/18  Last Fill Quantity: 30,   # refills: 0    Last Office Visit with [facility] primary care provider: 10/27/17    Clinic visit frequency required: Q 6  months     Future Office visit:     Controlled substance agreement on file: Yes:  Date 3/9/17.     Processing:  Patient will  in clinic   checked in past 6 months?  Yes 3/2/18     Pato Pitts CMA

## 2018-03-02 NOTE — TELEPHONE ENCOUNTER
Message from AtlanteTrekt:  Original authorizing provider: MD Manjula Grajeda ROSADaryn Dorman would like a refill of the following medications:  amphetamine-dextroamphetamine (ADDERALL) 10 MG per tablet [Ray Johnson MD]  amphetamine-dextroamphetamine (ADDERALL XR) 30 MG per 24 hr capsule [Ray Johnson MD]    Preferred pharmacy: [facility] [address] [place] [address] AVE [address] AT [facility] & [address]    Comment:  Hello - I need to refill my Adderall prescription. My wife will  the script on Friday, March 2nd if possible. Thank you!

## 2018-04-06 ENCOUNTER — MYC MEDICAL ADVICE (OUTPATIENT)
Dept: FAMILY MEDICINE | Facility: CLINIC | Age: 33
End: 2018-04-06

## 2018-04-24 ENCOUNTER — OFFICE VISIT (OUTPATIENT)
Dept: FAMILY MEDICINE | Facility: CLINIC | Age: 33
End: 2018-04-24
Payer: COMMERCIAL

## 2018-04-24 VITALS
SYSTOLIC BLOOD PRESSURE: 132 MMHG | WEIGHT: 212.9 LBS | DIASTOLIC BLOOD PRESSURE: 88 MMHG | HEIGHT: 70 IN | OXYGEN SATURATION: 96 % | TEMPERATURE: 97.7 F | BODY MASS INDEX: 30.48 KG/M2 | HEART RATE: 90 BPM

## 2018-04-24 DIAGNOSIS — F41.8 DEPRESSION WITH ANXIETY: ICD-10-CM

## 2018-04-24 DIAGNOSIS — F90.9 ATTENTION DEFICIT HYPERACTIVITY DISORDER (ADHD), UNSPECIFIED ADHD TYPE: Primary | ICD-10-CM

## 2018-04-24 DIAGNOSIS — R21 RASH: ICD-10-CM

## 2018-04-24 PROCEDURE — 99214 OFFICE O/P EST MOD 30 MIN: CPT | Performed by: INTERNAL MEDICINE

## 2018-04-24 RX ORDER — LISDEXAMFETAMINE DIMESYLATE 30 MG/1
30 CAPSULE ORAL EVERY MORNING
Qty: 30 CAPSULE | Refills: 0 | Status: SHIPPED | OUTPATIENT
Start: 2018-04-24 | End: 2018-05-21

## 2018-04-24 ASSESSMENT — ENCOUNTER SYMPTOMS
NAUSEA: 0
HEADACHES: 0
INSOMNIA: 0
DEPRESSION: 1
TREMORS: 0
NERVOUS/ANXIOUS: 1
BLURRED VISION: 0
CONSTIPATION: 0
ABDOMINAL PAIN: 0
PALPITATIONS: 0
MYALGIAS: 0
WEIGHT LOSS: 0
HALLUCINATIONS: 0
VOMITING: 0
DIARRHEA: 0
DIZZINESS: 0

## 2018-04-24 NOTE — MR AVS SNAPSHOT
After Visit Summary   4/24/2018    Manjula Dorman    MRN: [id number]           Patient Information     Date Of Birth          1985        Visit Information        Provider Department      4/24/2018 4:00 PM Ray Johnson MD [facility]        Today's Diagnoses     Attention deficit hyperactivity disorder (ADHD), unspecified ADHD type    -  1    Depression with anxiety          Care Instructions    Establish with a counselor for your depression and anxiety.  Once you and the counselor feels confident to get off the Zoloft, let the doctor know.    Start Vyvanse for your ADD.  Call if not effective or if you develop any side effects.    Follow up in 2-3 months.          Follow-ups after your visit        Who to contact     If you have questions or need follow up information about today's clinic visit or your schedule please contact [facility] directly at [phone].  Normal or non-critical lab and imaging results will be communicated to you by Mondokiohart, letter or phone within 4 business days after the clinic has received the results. If you do not hear from us within 7 days, please contact the clinic through Mondokiohart or phone. If you have a critical or abnormal lab result, we will notify you by phone as soon as possible.  Submit refill requests through [employer] or call your pharmacy and they will forward the refill request to us. Please allow 3 business days for your refill to be completed.          Additional Information About Your Visit        MyChart Information     [employer] gives you secure access to your electronic health record. If you see a primary care provider, you can also send messages to your care team and make appointments. If you have questions, please call your primary care clinic.  If you do not have a primary care provider, please call [phone] and they will assist you.        Care EveryWhere ID     This is your Care EveryWhere ID. This  "could be used by other organizations to access your [facility] medical records  [id number]        Your Vitals Were     Pulse Temperature Height Pulse Oximetry BMI (Body Mass Index)       90 97.7  F (36.5  C) (Oral) 5' 9.5\" (1.765 m) 96% 30.99 kg/m2        Blood Pressure from Last 3 Encounters:   04/24/18 132/88   10/27/17 121/70   04/04/17 127/81    Weight from Last 3 Encounters:   04/24/18 212 lb 14.4 oz (96.6 kg)   10/27/17 201 lb (91.2 kg)   04/04/17 200 lb (90.7 kg)              Today, you had the following     No orders found for display         Today's Medication Changes          These changes are accurate as of 4/24/18  5:04 PM.  If you have any questions, ask your nurse or doctor.               Start taking these medicines.        Dose/Directions    lisdexamfetamine 30 MG capsule   Commonly known as:  VYVANSE   Used for:  Attention deficit hyperactivity disorder (ADHD), unspecified ADHD type   Started by:  Ray Johnson MD        Dose:  30 mg   Take 1 capsule (30 mg) by mouth every morning   Quantity:  30 capsule   Refills:  0       sertraline 50 MG tablet   Commonly known as:  ZOLOFT   Used for:  Depression with anxiety   Started by:  Ray Johnson MD        Dose:  50 mg   Take 1 tablet (50 mg) by mouth daily   Quantity:  30 tablet   Refills:  1         Stop taking these medicines if you haven't already. Please contact your care team if you have questions.     amphetamine-dextroamphetamine 10 MG per tablet   Commonly known as:  ADDERALL   Stopped by:  Ray Johnson MD           amphetamine-dextroamphetamine 30 MG per 24 hr capsule   Commonly known as:  ADDERALL XR   Stopped by:  Ray Johnson MD           DULoxetine 30 MG EC capsule   Commonly known as:  CYMBALTA   Stopped by:  Ray Johnson MD                Where to get your medicines      These medications were sent to [facility] [address] - " [place], [place] - [address] AT [facility] [address]  [address], [facility] [address]     Phone:  [phone]     sertraline 50 MG tablet         Some of these will need a paper prescription and others can be bought over the counter.  Ask your nurse if you have questions.     Bring a paper prescription for each of these medications     lisdexamfetamine 30 MG capsule                Primary Care Provider Office Phone # Fax #    Ray Erik Johnson [phone] [address] [facility] [address]  [facility] [place] [address]        Equal Access to Services     [facility] : Hadii aad ku hadasho Soomaali, waaxda luqadaha, qaybta kaalmada adeegyada, waxay dinoin hayjasbirn fela young . So [facility] [phone].    ATENCIÓN: Si habla español, tiene a gil disposición servicios gratuitos de asistencia lingüística. LlKettering Health Hamilton [phone].    We comply with applicable federal civil rights laws and [place] laws. We do not discriminate on the basis of race, color, national origin, age, disability, sex, sexual orientation, or gender identity.            Thank you!     Thank you for choosing [facility]  for your care. Our goal is always to provide you with excellent care. Hearing back from our patients is one way we can continue to improve our services. Please take a few minutes to complete the written survey that you may receive in the mail after your visit with us. Thank you!             Your Updated Medication List - Protect others around you: Learn how to safely use, store and throw away your medicines at www.disposemymeds.org.          This list is accurate as of 4/24/18  5:04 PM.  Always use your most recent med list.                   Brand Name Dispense Instructions for use Diagnosis    lisdexamfetamine 30 MG capsule    VYVANSE    30 capsule    Take 1 capsule (30 mg) by mouth every morning    Attention deficit hyperactivity disorder (ADHD), unspecified ADHD type        sertraline 50 MG tablet    ZOLOFT    30 tablet    Take 1 tablet (50 mg) by mouth daily    Depression with anxiety

## 2018-04-24 NOTE — PROGRESS NOTES
HPI      SUBJECTIVE:   Manjula Dorman is a 33 year old male who presents to clinic today for the following health issues:      I last saw the patient at the clinic on 10/27/2017.  During that clinic visit, I increased his Adderall XR to 30 mg daily because the patient wished that the morning dose could last longer into the early afternoon.  I also continued his regular release Adderall 10 mg in the afternoon.  We also talked about switching him from Zoloft to Cymbalta due to sexual dysfunction side effects with the Zoloft.    Since his last clinic visit, the patient feels that the Adderall has not been working well for him.  He still finds it difficult to sustain his focus at work.    The patient did not proceed switching to Cymbalta and continued with the Zoloft.  He would like to try weaning off the medication.    He would also like to consult with a dermatologist due to persistent rash at his legs.      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   Eyes: Negative for blurred vision.   Cardiovascular: Negative for chest pain and palpitations.   Gastrointestinal: Negative for abdominal pain, constipation, diarrhea, nausea and vomiting.   Musculoskeletal: Negative for myalgias.   Skin: Positive for rash.   Neurological: Negative for dizziness, tremors and headaches.  "  Psychiatric/Behavioral: Positive for depression. Negative for hallucinations and suicidal ideas. The patient is nervous/anxious. The patient does not have insomnia.        /88 (BP Location: Right arm, Cuff Size: Adult Large)  Pulse 90  Temp 97.7  F (36.5  C) (Oral)  Ht 5' 9.5\" (1.765 m)  Wt 212 lb 14.4 oz (96.6 kg)  SpO2 96%  BMI 30.99 kg/m2      Physical Exam   Constitutional: He is oriented to person, place, and time. No distress.   Neck: No thyromegaly present.   Cardiovascular: Normal rate, regular rhythm and normal heart sounds.    Neurological: He is alert and oriented to person, place, and time. Coordination normal. GCS score is 15.   Skin: Rash noted.   Psychiatric: Mood and affect normal.   Vitals reviewed.        ICD-10-CM    1. Attention deficit hyperactivity disorder (ADHD), unspecified ADHD type F90.9 lisdexamfetamine (VYVANSE) 30 MG capsule   2. Depression with anxiety F41.8 sertraline (ZOLOFT) 50 MG tablet   3. Rash R21 DERMATOLOGY REFERRAL       Patient Instructions   Establish with a counselor for your depression and anxiety.  Once you and the counselor feels confident to get off the Zoloft, let the doctor know.    Start Vyvanse for your ADD.  Call if not effective or if you develop any side effects.    Follow up in 2-3 months.        "

## 2018-04-24 NOTE — PATIENT INSTRUCTIONS
Establish with a counselor for your depression and anxiety.  Once you and the counselor feels confident to get off the Zoloft, let the doctor know.    Start Vyvanse for your ADD.  Call if not effective or if you develop any side effects.    Follow up in 2-3 months.

## 2018-04-24 NOTE — NURSING NOTE
"Chief Complaint   Patient presents with     Follow Up For     ADHD     Recheck Medication       Initial /88 (BP Location: Right arm, Cuff Size: Adult Large)  Pulse 90  Temp 97.7  F (36.5  C) (Oral)  Ht 5' 9.5\" (1.765 m)  Wt 212 lb 14.4 oz (96.6 kg)  SpO2 96%  BMI 30.99 kg/m2 Estimated body mass index is 30.99 kg/(m^2) as calculated from the following:    Height as of this encounter: 5' 9.5\" (1.765 m).    Weight as of this encounter: 212 lb 14.4 oz (96.6 kg).  Medication Reconciliation: complete   Elana Salinas MA  "

## 2018-04-26 ENCOUNTER — TELEPHONE (OUTPATIENT)
Dept: FAMILY MEDICINE | Facility: CLINIC | Age: 33
End: 2018-04-26

## 2018-04-26 NOTE — TELEPHONE ENCOUNTER
Prior Authorization Retail Medication Request    Medication/Dose: lisdexamfetamine (VYVANSE) 30 MG capsule - APPROVED  ICD code (if different than what is on RX):    Previously Tried and Failed:    Rationale:      Insurance Name:  ES1  Insurance ID:        Pharmacy Information (if different than what is on RX)  Name:    Phone:

## 2018-04-26 NOTE — TELEPHONE ENCOUNTER
PA has been approved  CaseId:[id number];Status:Approved;Review Type:Prior Auth;Coverage Start Date:03/27/2018;Coverage End Date:04/26/2019.    Patient and pharmacy notified.    Pato Pitts CMA

## 2018-05-21 ENCOUNTER — MYC REFILL (OUTPATIENT)
Dept: FAMILY MEDICINE | Facility: CLINIC | Age: 33
End: 2018-05-21

## 2018-05-21 DIAGNOSIS — F90.9 ATTENTION DEFICIT HYPERACTIVITY DISORDER (ADHD), UNSPECIFIED ADHD TYPE: ICD-10-CM

## 2018-05-22 ENCOUNTER — MYC MEDICAL ADVICE (OUTPATIENT)
Dept: FAMILY MEDICINE | Facility: CLINIC | Age: 33
End: 2018-05-22

## 2018-05-22 RX ORDER — LISDEXAMFETAMINE DIMESYLATE 30 MG/1
30 CAPSULE ORAL EVERY MORNING
Qty: 30 CAPSULE | Refills: 0 | Status: SHIPPED | OUTPATIENT
Start: 2018-05-22 | End: 2018-06-25

## 2018-05-22 NOTE — TELEPHONE ENCOUNTER
Requested Prescriptions   Pending Prescriptions Disp Refills     lisdexamfetamine (VYVANSE) 30 MG capsule 30 capsule 0     Sig: Take 1 capsule (30 mg) by mouth every morning    There is no refill protocol information for this order        Controlled Substance Refill Request for lisdexamfetamine 30mg    Last refill: 4/24/18    Last clinic visit: 4/24/18     Clinic visit frequency required: Q 3 months  Next appt:  NONE     Controlled substance agreement on file: Yes:  Date 3/9/17.    Documentation in problem list reviewed:  No    Processing:  Patient will  in clinic    RX [place] reviewed:  not reviewed/not due -  [place]  [email or web address]

## 2018-05-22 NOTE — TELEPHONE ENCOUNTER
Message from Nusockett:  Original authorizing provider: MD Manjula Grajeda would like a refill of the following medications:  lisdexamfetamine (VYVANSE) 30 MG capsule [Ray Johnson MD]    Preferred pharmacy: [facility] [address] [place] [address] AVE [address] AT [facility] [address]    Comment:  Hello - I need to refill my script. Thank you!

## 2018-06-25 ENCOUNTER — MYC REFILL (OUTPATIENT)
Dept: FAMILY MEDICINE | Facility: CLINIC | Age: 33
End: 2018-06-25

## 2018-06-25 DIAGNOSIS — F90.9 ATTENTION DEFICIT HYPERACTIVITY DISORDER (ADHD), UNSPECIFIED ADHD TYPE: ICD-10-CM

## 2018-06-25 NOTE — TELEPHONE ENCOUNTER
Lisdexamfetamine 30 mg    Last Written Prescription Date:  05/22/18  Last Fill Quantity: 30 capsules,  # refills: 0   Last office visit: 4/24/2018 with prescribing provider:  Elizabeth   Future Office Visit:      Routing refill request to provider for review/approval because:  Drug not on the [facility], [facility] or [facility] refill protocol or controlled substance

## 2018-06-25 NOTE — TELEPHONE ENCOUNTER
Message from [employer]:  Original authorizing provider: MD Manjula Grajeda would like a refill of the following medications:  lisdexamfetamine (VYVANSE) 30 MG capsule [Ray Johnson MD]    Preferred pharmacy: [facility] [address] [place] [address] [address]VE [address] AT [facility] & [address]    Comment:  Hello - I need to refill my prescription. Thank you!

## 2018-06-27 RX ORDER — LISDEXAMFETAMINE DIMESYLATE 30 MG/1
30 CAPSULE ORAL EVERY MORNING
Qty: 30 CAPSULE | Refills: 0 | Status: SHIPPED | OUTPATIENT
Start: 2018-06-27 | End: 2018-10-30 | Stop reason: DRUGHIGH

## 2018-07-13 ENCOUNTER — TELEPHONE (OUTPATIENT)
Dept: FAMILY MEDICINE | Facility: CLINIC | Age: 33
End: 2018-07-13

## 2018-07-13 NOTE — TELEPHONE ENCOUNTER
Reason for Call:  Update on referral     Detailed comments: Spoke with Pa with [facility] she is calling with an update on a referral that was sent:   They have been trying to reach out to PT and the PT has not called back and will no longer try to reach the Pt   If PT wished to schedule they can reach out to them         Can we leave a detailed message on this number? YES    Call taken on 7/13/2018 at 1:33 PM by Farideh Ortiz

## 2018-07-20 ENCOUNTER — TRANSFERRED RECORDS (OUTPATIENT)
Dept: HEALTH INFORMATION MANAGEMENT | Facility: CLINIC | Age: 33
End: 2018-07-20

## 2018-07-26 ENCOUNTER — OFFICE VISIT (OUTPATIENT)
Dept: FAMILY MEDICINE | Facility: CLINIC | Age: 33
End: 2018-07-26
Payer: COMMERCIAL

## 2018-07-26 VITALS
WEIGHT: 214 LBS | DIASTOLIC BLOOD PRESSURE: 86 MMHG | SYSTOLIC BLOOD PRESSURE: 136 MMHG | HEIGHT: 70 IN | BODY MASS INDEX: 30.64 KG/M2 | HEART RATE: 73 BPM | OXYGEN SATURATION: 92 % | TEMPERATURE: 99 F

## 2018-07-26 DIAGNOSIS — F90.9 ATTENTION DEFICIT HYPERACTIVITY DISORDER (ADHD), UNSPECIFIED ADHD TYPE: ICD-10-CM

## 2018-07-26 DIAGNOSIS — F41.1 GAD (GENERALIZED ANXIETY DISORDER): ICD-10-CM

## 2018-07-26 DIAGNOSIS — F32.0 MILD MAJOR DEPRESSION (H): Primary | ICD-10-CM

## 2018-07-26 PROCEDURE — 99214 OFFICE O/P EST MOD 30 MIN: CPT | Performed by: INTERNAL MEDICINE

## 2018-07-26 RX ORDER — LISDEXAMFETAMINE DIMESYLATE 50 MG/1
50 CAPSULE ORAL EVERY MORNING
Qty: 30 CAPSULE | Refills: 0 | Status: SHIPPED | OUTPATIENT
Start: 2018-07-26 | End: 2018-08-27

## 2018-07-26 ASSESSMENT — ENCOUNTER SYMPTOMS
CONSTIPATION: 0
INSOMNIA: 0
MYALGIAS: 0
DEPRESSION: 1
NERVOUS/ANXIOUS: 1
NAUSEA: 0
ABDOMINAL PAIN: 0
TREMORS: 0
VOMITING: 0
HEADACHES: 0
PALPITATIONS: 0
BLURRED VISION: 0
DIARRHEA: 0
DIZZINESS: 0

## 2018-07-26 ASSESSMENT — ANXIETY QUESTIONNAIRES
1. FEELING NERVOUS, ANXIOUS, OR ON EDGE: SEVERAL DAYS
2. NOT BEING ABLE TO STOP OR CONTROL WORRYING: SEVERAL DAYS
6. BECOMING EASILY ANNOYED OR IRRITABLE: MORE THAN HALF THE DAYS
7. FEELING AFRAID AS IF SOMETHING AWFUL MIGHT HAPPEN: MORE THAN HALF THE DAYS
3. WORRYING TOO MUCH ABOUT DIFFERENT THINGS: MORE THAN HALF THE DAYS
5. BEING SO RESTLESS THAT IT IS HARD TO SIT STILL: NEARLY EVERY DAY
IF YOU CHECKED OFF ANY PROBLEMS ON THIS QUESTIONNAIRE, HOW DIFFICULT HAVE THESE PROBLEMS MADE IT FOR YOU TO DO YOUR WORK, TAKE CARE OF THINGS AT HOME, OR GET ALONG WITH OTHER PEOPLE: SOMEWHAT DIFFICULT
GAD7 TOTAL SCORE: 12

## 2018-07-26 ASSESSMENT — PATIENT HEALTH QUESTIONNAIRE - PHQ9: 5. POOR APPETITE OR OVEREATING: SEVERAL DAYS

## 2018-07-26 NOTE — MR AVS SNAPSHOT
After Visit Summary   7/26/2018    Manjula Dorman    MRN: [id number]           Patient Information     Date Of Birth          1985        Visit Information        Provider Department      7/26/2018 5:00 PM Ray Johnson MD [facility]        Today's Diagnoses     Attention deficit hyperactivity disorder (ADHD), unspecified ADHD type    -  1    Depression with anxiety          Care Instructions    Update doctor with your depression/anxiety and attention deficit.          Follow-ups after your visit        Who to contact     If you have questions or need follow up information about today's clinic visit or your schedule please contact [facility] directly at [phone].  Normal or non-critical lab and imaging results will be communicated to you by MyChart, letter or phone within 4 business days after the clinic has received the results. If you do not hear from us within 7 days, please contact the clinic through Linksifyhart or phone. If you have a critical or abnormal lab result, we will notify you by phone as soon as possible.  Submit refill requests through [employer] or call your pharmacy and they will forward the refill request to us. Please allow 3 business days for your refill to be completed.          Additional Information About Your Visit        MyChart Information     [employer] gives you secure access to your electronic health record. If you see a primary care provider, you can also send messages to your care team and make appointments. If you have questions, please call your primary care clinic.  If you do not have a primary care provider, please call [phone] and they will assist you.        Care EveryWhere ID     This is your Care EveryWhere ID. This could be used by other organizations to access your [facility] medical records  [id number]        Your Vitals Were     Pulse Temperature Height Pulse Oximetry BMI (Body Mass Index)       73 99  F (37.2  " C) (Oral) 5' 9.5\" (1.765 m) 92% 31.15 kg/m2        Blood Pressure from Last 3 Encounters:   07/26/18 (!) 136/96   04/24/18 132/88   10/27/17 121/70    Weight from Last 3 Encounters:   07/26/18 214 lb (97.1 kg)   04/24/18 212 lb 14.4 oz (96.6 kg)   10/27/17 201 lb (91.2 kg)              We Performed the Following     DEPRESSION ACTION PLAN (DAP)          Today's Medication Changes          These changes are accurate as of 7/26/18  5:23 PM.  If you have any questions, ask your nurse or doctor.               These medicines have changed or have updated prescriptions.        Dose/Directions    * lisdexamfetamine 30 MG capsule   Commonly known as:  VYVANSE   This may have changed:  Another medication with the same name was added. Make sure you understand how and when to take each.   Used for:  Attention deficit hyperactivity disorder (ADHD), unspecified ADHD type   Changed by:  Ray Johnson MD        Dose:  30 mg   Take 1 capsule (30 mg) by mouth every morning   Quantity:  30 capsule   Refills:  0       * lisdexamfetamine 50 MG capsule   Commonly known as:  VYVANSE   This may have changed:  You were already taking a medication with the same name, and this prescription was added. Make sure you understand how and when to take each.   Used for:  Attention deficit hyperactivity disorder (ADHD), unspecified ADHD type   Changed by:  Ray Johnson MD        Dose:  50 mg   Take 1 capsule (50 mg) by mouth every morning   Quantity:  30 capsule   Refills:  0       sertraline 50 MG tablet   Commonly known as:  ZOLOFT   This may have changed:  how much to take   Used for:  Depression with anxiety   Changed by:  Ray Johnson MD        Dose:  75 mg   Take 1.5 tablets (75 mg) by mouth daily   Quantity:  45 tablet   Refills:  1       * Notice:  This list has 2 medication(s) that are the same as other medications prescribed for you. Read the directions carefully, and ask your " doctor or other care provider to review them with you.         Where to get your medicines      [facility] - [place], [place] - [address] AT [facility] [address], [facility] [address]     Phone:  [phone]     sertraline 50 MG tablet         Some of these will need a paper prescription and others can be bought over the counter.  Ask your nurse if you have questions.     Bring a paper prescription for each of these medications     lisdexamfetamine 50 MG capsule                Primary Care Provider Office Phone # Fax #    Ray Erik Johnson [phone] [address] [facility] [address]  [facility] [address]        Equal Access to Services     INNA SEALS : Hadii cecil rich hadasho Soomaali, waaxda luqadaha, qaybta kaalmada adeegyada, beau young . So [facility] [phone].    ATENCIÓN: Si habla español, tiene a gil disposición servicios gratuitos de asistencia lingüística. Llame al [phone].    We comply with applicable federal civil rights laws and [place] laws. We do not discriminate on the basis of race, color, national origin, age, disability, sex, sexual orientation, or gender identity.            Thank you!     Thank you for choosing [facility]  for your care. Our goal is always to provide you with excellent care. Hearing back from our patients is one way we can continue to improve our services. Please take a few minutes to complete the written survey that you may receive in the mail after your visit with us. Thank you!             Your Updated Medication List - Protect others around you: Learn how to safely use, store and throw away your medicines at www.disposemymeds.org.          This list is accurate as of 7/26/18  5:23 PM.  Always use your most recent med list.                   Brand Name Dispense Instructions for use Diagnosis    * lisdexamfetamine 30 MG  capsule    VYVANSE    30 capsule    Take 1 capsule (30 mg) by mouth every morning    Attention deficit hyperactivity disorder (ADHD), unspecified ADHD type       * lisdexamfetamine 50 MG capsule    VYVANSE    30 capsule    Take 1 capsule (50 mg) by mouth every morning    Attention deficit hyperactivity disorder (ADHD), unspecified ADHD type       sertraline 50 MG tablet    ZOLOFT    45 tablet    Take 1.5 tablets (75 mg) by mouth daily    Depression with anxiety       * Notice:  This list has 2 medication(s) that are the same as other medications prescribed for you. Read the directions carefully, and ask your doctor or other care provider to review them with you.

## 2018-07-26 NOTE — PROGRESS NOTES
HPI    SUBJECTIVE:   Manjula Dorman is a 33 year old male who presents to clinic today for the following health issues:      Medication Followup of Vyvanse    Taking Medication as prescribed: yes    Side Effects:  None    Medication Helping Symptoms:  yes       I last saw the patient at the clinic on 4/24/2018 for his attention deficit disorder and depression with anxiety. Adderall was not working for him so I switched him to Vyvanse 30 mg daily and maintained him on Zoloft 50 mg daily.    Since his last clinic visit, he states that the Vyvanse works well but does not last long enough into the afternoon. He takes it around 7 to 8:00 in the morning and by around 1-2:00 PM it starts wearing off, leading him to struggle with maintaining his focus in the late afternoon. He is tolerating the medication well.    Anxiety and depression are doing fair on Zoloft.  He is becoming more anxious as he and his wife anticipate the birth of their baby girl in September of this year. Still experiences lack of interest in previously enjoyed activities.      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   Eyes: Negative for blurred vision.   Cardiovascular: Negative for chest pain and palpitations.   Gastrointestinal: Negative for abdominal pain, constipation, diarrhea, nausea and vomiting.   Musculoskeletal: Negative for  "myalgias.   Neurological: Negative for dizziness, tremors and headaches.   Psychiatric/Behavioral: Positive for depression. Negative for suicidal ideas. The patient is nervous/anxious. The patient does not have insomnia.        /86 (BP Location: Left arm, Cuff Size: Adult Large)  Pulse 73  Temp 99  F (37.2  C) (Oral)  Ht 5' 9.5\" (1.765 m)  Wt 214 lb (97.1 kg)  SpO2 92%  BMI 31.15 kg/m2      Physical Exam   Constitutional: He is oriented to person, place, and time. No distress.   Neck: No thyromegaly present.   Cardiovascular: Normal rate, regular rhythm and normal heart sounds.    Neurological: He is alert and oriented to person, place, and time. GCS score is 15.   Psychiatric: Affect normal.   Anxious appearing   Vitals reviewed.        ICD-10-CM    1. Mild major depression (H) F32.0 sertraline (ZOLOFT) 50 MG tablet     DEPRESSION ACTION PLAN (DAP)   2. HOMA (generalized anxiety disorder) F41.1 sertraline (ZOLOFT) 50 MG tablet   3. Attention deficit hyperactivity disorder (ADHD), unspecified ADHD type F90.9 lisdexamfetamine (VYVANSE) 50 MG capsule       Patient Instructions   Update doctor with your depression/anxiety and attention deficit.        "

## 2018-07-26 NOTE — LETTER
My Depression Action Plan  Name: Manjula Dorman   Date of Birth 1985  Date: 7/26/2018    My doctor: Ray Johnson   My clinic: [place] [address] [facility] [address]  [phone]          GREEN    ZONE   Good Control    What it looks like:     Things are going generally well. You have normal up s and down s. You may even feel depressed from time to time, but bad moods usually last less than a day.   What you need to do:  1. Continue to care for yourself (see self care plan)  2. Check your depression survival kit and update it as needed  3. Follow your physician s recommendations including any medication.  4. Do not stop taking medication unless you consult with your physician first.           YELLOW         ZONE Getting Worse    What it looks like:     Depression is starting to interfere with your life.     It may be hard to get out of bed; you may be starting to isolate yourself from others.    Symptoms of depression are starting to last most all day and this has happened for several days.     You may have suicidal thoughts but they are not constant.   What you need to do:     1. Call your care team, your response to treatment will improve if you keep your care team informed of your progress. Yellow periods are signs an adjustment may need to be made.     2. Continue your self-care, even if you have to fake it!    3. Talk to someone in your support network    4. Open up your depression survival kit           RED    ZONE Medical Alert - Get Help    What it looks like:     Depression is seriously interfering with your life.     You may experience these or other symptoms: You can t get out of bed most days, can t work or engage in other necessary activities, you have trouble taking care of basic hygiene, or basic responsibilities, thoughts of suicide or death that will not go away, self-injurious behavior.     What you need to do:  1. Call your care  team and request a same-day appointment. If they are not available (weekends or after hours) call your local crisis line, emergency room or 911.            Depression Self Care Plan / Survival Kit    Self-Care for Depression  Here s the deal. Your body and mind are really not as separate as most people think.  What you do and think affects how you feel and how you feel influences what you do and think. This means if you do things that people who feel good do, it will help you feel better.  Sometimes this is all it takes.  There is also a place for medication and therapy depending on how severe your depression is, so be sure to consult with your medical provider and/ or Behavioral Health Consultant if your symptoms are worsening or not improving.     In order to better manage my stress, I will:    Exercise  Get some form of exercise, every day. This will help reduce pain and release endorphins, the  feel good  chemicals in your brain. This is almost as good as taking antidepressants!  This is not the same as joining a gym and then never going! (they count on that by the way ) It can be as simple as just going for a walk or doing some gardening, anything that will get you moving.      Hygiene   Maintain good hygiene (Get out of bed in the morning, Make your bed, Brush your teeth, Take a shower, and Get dressed like you were going to work, even if you are unemployed).  If your clothes don't fit try to get ones that do.    Diet  I will strive to eat foods that are good for me, drink plenty of water, and avoid excessive sugar, caffeine, alcohol, and other mood-altering substances.  Some foods that are helpful in depression are: complex carbohydrates, B vitamins, flaxseed, fish or fish oil, fresh fruits and vegetables.    Psychotherapy  I agree to participate in Individual Therapy (if recommended).    Medication  If prescribed medications, I agree to take them.  Missing doses can result in serious side effects.  I  understand that drinking alcohol, or other illicit drug use, may cause potential side effects.  I will not stop my medication abruptly without first discussing it with my provider.    Staying Connected With Others  I will stay in touch with my friends, family members, and my primary care provider/team.    Use your imagination  Be creative.  We all have a creative side; it doesn t matter if it s oil painting, sand castles, or mud pies! This will also kick up the endorphins.    Witness Beauty  (AKA stop and smell the roses) Take a look outside, even in mid-winter. Notice colors, textures. Watch the squirrels and birds.     Service to others  Be of service to others.  There is always someone else in need.  By helping others we can  get out of ourselves  and remember the really important things.  This also provides opportunities for practicing all the other parts of the program.    Humor  Laugh and be silly!  Adjust your TV habits for less news and crime-drama and more comedy.    Control your stress  Try breathing deep, massage therapy, biofeedback, and meditation. Find time to relax each day.     My support system    Clinic Contact:  Phone number:    Contact 1:  Phone number:    Contact 2:  Phone number:    Druze/:  Phone number:    Therapist:  Phone number:    Local crisis center:    Phone number:    Other community support:  Phone number:

## 2018-07-27 ASSESSMENT — ANXIETY QUESTIONNAIRES: GAD7 TOTAL SCORE: 12

## 2018-07-27 ASSESSMENT — PATIENT HEALTH QUESTIONNAIRE - PHQ9: SUM OF ALL RESPONSES TO PHQ QUESTIONS 1-9: 8

## 2018-08-27 ENCOUNTER — MYC REFILL (OUTPATIENT)
Dept: FAMILY MEDICINE | Facility: CLINIC | Age: 33
End: 2018-08-27

## 2018-08-27 DIAGNOSIS — F90.9 ATTENTION DEFICIT HYPERACTIVITY DISORDER (ADHD), UNSPECIFIED ADHD TYPE: ICD-10-CM

## 2018-08-27 NOTE — TELEPHONE ENCOUNTER
Message from [employer]:  Original authorizing provider: MD aMnjula Grajeda would like a refill of the following medications:  lisdexamfetamine (VYVANSE) 50 MG capsule [Ray Johnson MD]    Preferred pharmacy: [facility] [address] [place] [address] AVE [address] AT [facility] & [address]    Comment:  Hello - I need to refill my vyvanse prescription. Also, would I be able to get advance scripts written for the next couple of months? Thank you!

## 2018-08-28 NOTE — TELEPHONE ENCOUNTER
Lisdexamfetamine 50 mg    Last Written Prescription Date:  07/26/18  Last Fill Quantity: 30 capsules,  # refills: 0   Last office visit: 7/26/2018 with prescribing provider:  Elizabeth   Future Office Visit:      Routing refill request to provider for review/approval because:  Drug not on the [facility], [facility] or [facility] refill protocol or controlled substance

## 2018-08-29 RX ORDER — LISDEXAMFETAMINE DIMESYLATE 50 MG/1
50 CAPSULE ORAL EVERY MORNING
Qty: 30 CAPSULE | Refills: 0 | Status: SHIPPED | OUTPATIENT
Start: 2018-08-29 | End: 2018-09-25

## 2018-08-30 ENCOUNTER — TELEPHONE (OUTPATIENT)
Dept: FAMILY MEDICINE | Facility: CLINIC | Age: 33
End: 2018-08-30

## 2018-08-30 NOTE — TELEPHONE ENCOUNTER
Pt's wife, Jak, is picking up Vyvanse rx from .  Manjula may be reached at [phone]. Ok to leave a detailed message.

## 2018-09-25 ENCOUNTER — MYC REFILL (OUTPATIENT)
Dept: FAMILY MEDICINE | Facility: CLINIC | Age: 33
End: 2018-09-25

## 2018-09-25 DIAGNOSIS — F90.9 ATTENTION DEFICIT HYPERACTIVITY DISORDER (ADHD), UNSPECIFIED ADHD TYPE: ICD-10-CM

## 2018-09-26 NOTE — TELEPHONE ENCOUNTER
Message from [employer]:  Original authorizing provider: MD Manjula Bryant would like a refill of the following medications:  lisdexamfetamine (VYVANSE) 50 MG capsule [Bruce Santacruz MD]    Preferred pharmacy: [facility] [address] [place] [address] [address]VE [address] AT [facility] [address]    Comment:  Hello, I need to refill my vyvanse prescription. Thank you!

## 2018-09-27 NOTE — TELEPHONE ENCOUNTER
Pended 50 mg Vyvanse Rx for  review and sign.  It looks like dose was increased from 30 mg to 50 mg at July office visit by PCP.  I removed 30 mg Vyvanse from medication list to avoid confusion.      Thank you,  Leigh Rm RN

## 2018-09-27 NOTE — TELEPHONE ENCOUNTER
lisdexamfetamine (VYVANSE) 50 MG capsule    Last Written Prescription Date:  08/29/2018  Last Fill Quantity: 30,   # refills: 0  Last Office Visit: 07/26/2018  Future Office visit:       Routing refill request to provider for review/approval because:  Drug not on the [facility], [facility] or [facility] refill protocol or controlled substance

## 2018-09-28 RX ORDER — LISDEXAMFETAMINE DIMESYLATE 50 MG/1
50 CAPSULE ORAL EVERY MORNING
Qty: 30 CAPSULE | Refills: 0 | Status: SHIPPED | OUTPATIENT
Start: 2018-09-28 | End: 2018-10-29

## 2018-09-28 NOTE — TELEPHONE ENCOUNTER
Pt is calling in to check on the status of his refill that he requested.  Looks as though it just needs to be signed by Dr. Johnson.  Please expedite as pt only has one day left of medication and he requested on 09/26/2018.     no

## 2018-10-01 DIAGNOSIS — F41.1 GAD (GENERALIZED ANXIETY DISORDER): ICD-10-CM

## 2018-10-01 DIAGNOSIS — F33.42 RECURRENT MAJOR DEPRESSIVE DISORDER, IN FULL REMISSION (H): Primary | ICD-10-CM

## 2018-10-01 DIAGNOSIS — F32.0 MILD MAJOR DEPRESSION (H): ICD-10-CM

## 2018-10-01 NOTE — LETTER
My Depression Action Plan  Name: Manjula Dorman   Date of Birth 1985  Date: 10/5/2018    My doctor: Ray Johnson   My clinic: [place] [address] [facility] [address]  [phone]          GREEN    ZONE   Good Control    What it looks like:     Things are going generally well. You have normal up s and down s. You may even feel depressed from time to time, but bad moods usually last less than a day.   What you need to do:  1. Continue to care for yourself (see self care plan)  2. Check your depression survival kit and update it as needed  3. Follow your physician s recommendations including any medication.  4. Do not stop taking medication unless you consult with your physician first.           YELLOW         ZONE Getting Worse    What it looks like:     Depression is starting to interfere with your life.     It may be hard to get out of bed; you may be starting to isolate yourself from others.    Symptoms of depression are starting to last most all day and this has happened for several days.     You may have suicidal thoughts but they are not constant.   What you need to do:     1. Call your care team, your response to treatment will improve if you keep your care team informed of your progress. Yellow periods are signs an adjustment may need to be made.     2. Continue your self-care, even if you have to fake it!    3. Talk to someone in your support network    4. Open up your depression survival kit           RED    ZONE Medical Alert - Get Help    What it looks like:     Depression is seriously interfering with your life.     You may experience these or other symptoms: You can t get out of bed most days, can t work or engage in other necessary activities, you have trouble taking care of basic hygiene, or basic responsibilities, thoughts of suicide or death that will not go away, self-injurious behavior.     What you need to do:  1. Call your care  team and request a same-day appointment. If they are not available (weekends or after hours) call your local crisis line, emergency room or 911.            Depression Self Care Plan / Survival Kit    Self-Care for Depression  Here s the deal. Your body and mind are really not as separate as most people think.  What you do and think affects how you feel and how you feel influences what you do and think. This means if you do things that people who feel good do, it will help you feel better.  Sometimes this is all it takes.  There is also a place for medication and therapy depending on how severe your depression is, so be sure to consult with your medical provider and/ or Behavioral Health Consultant if your symptoms are worsening or not improving.     In order to better manage my stress, I will:    Exercise  Get some form of exercise, every day. This will help reduce pain and release endorphins, the  feel good  chemicals in your brain. This is almost as good as taking antidepressants!  This is not the same as joining a gym and then never going! (they count on that by the way ) It can be as simple as just going for a walk or doing some gardening, anything that will get you moving.      Hygiene   Maintain good hygiene (Get out of bed in the morning, Make your bed, Brush your teeth, Take a shower, and Get dressed like you were going to work, even if you are unemployed).  If your clothes don't fit try to get ones that do.    Diet  I will strive to eat foods that are good for me, drink plenty of water, and avoid excessive sugar, caffeine, alcohol, and other mood-altering substances.  Some foods that are helpful in depression are: complex carbohydrates, B vitamins, flaxseed, fish or fish oil, fresh fruits and vegetables.    Psychotherapy  I agree to participate in Individual Therapy (if recommended).    Medication  If prescribed medications, I agree to take them.  Missing doses can result in serious side effects.  I  understand that drinking alcohol, or other illicit drug use, may cause potential side effects.  I will not stop my medication abruptly without first discussing it with my provider.    Staying Connected With Others  I will stay in touch with my friends, family members, and my primary care provider/team.    Use your imagination  Be creative.  We all have a creative side; it doesn t matter if it s oil painting, sand castles, or mud pies! This will also kick up the endorphins.    Witness Beauty  (AKA stop and smell the roses) Take a look outside, even in mid-winter. Notice colors, textures. Watch the squirrels and birds.     Service to others  Be of service to others.  There is always someone else in need.  By helping others we can  get out of ourselves  and remember the really important things.  This also provides opportunities for practicing all the other parts of the program.    Humor  Laugh and be silly!  Adjust your TV habits for less news and crime-drama and more comedy.    Control your stress  Try breathing deep, massage therapy, biofeedback, and meditation. Find time to relax each day.     My support system    Clinic Contact:  Phone number:    Contact 1:  Phone number:    Contact 2:  Phone number:    Rastafari/:  Phone number:    Therapist:  Phone number:    Local crisis center:    Phone number:    Other community support:  Phone number:

## 2018-10-02 NOTE — TELEPHONE ENCOUNTER
"sertraline (ZOLOFT) 50 MG tablet 45 tablet 1 7/26/2018     Last Written Prescription Date:  7/26/2018  Last Fill Quantity: 45,  # refills: 1   Last office visit: 7/26/2018 with prescribing provider:  Dr. Johnson   Future Office Visit:      Requested Prescriptions   Pending Prescriptions Disp Refills     sertraline (ZOLOFT) 50 MG tablet 45 tablet 1     Sig: Take 1.5 tablets (75 mg) by mouth daily    SSRIs Protocol Failed    10/1/2018  6:40 PM       Failed - PHQ-9 score less than 5 in past 6 months    Please review last PHQ-9 score.          Passed - Patient is age 18 or older       Passed - Recent (6 mo) or future (30 days) visit within the authorizing provider's specialty    Patient had office visit in the last 6 months or has a visit in the next 30 days with authorizing provider or within the authorizing provider's specialty.  See \"Patient Info\" tab in inbasket, or \"Choose Columns\" in Meds & Orders section of the refill encounter.              "

## 2018-10-03 NOTE — TELEPHONE ENCOUNTER
Routing refill request to provider for review/approval because:  Last PHQ9 above goal of 4  Alicia TADEO RN    PHQ-9 SCORE 10/27/2017 4/24/2018 7/26/2018   Total Score - - -   Total Score MyChart - - -   Total Score 11 9 8

## 2018-10-05 NOTE — TELEPHONE ENCOUNTER
Pt returned call:     Pt very pleasant and willing to complete questionnaire.     Completed PHQ-9:     PHQ-9 SCORE 4/24/2018 7/26/2018 10/5/2018   Total Score - - -   Total Score MyChart - - -   Total Score 9 8 1     Pt has a new (17 day old!) baby at home. He feels his sertraline is right where it needs to be, and is feeling really good right now (does have off between Sept 19th and Nov 1st, which he feels has helped a lot).     We discussed that if things change when he does return to work, and needs anything from us, to please reach out. Pt agrees.     Can refill be sent?     Thanks!    Daria JAIMES RN

## 2018-10-06 ASSESSMENT — PATIENT HEALTH QUESTIONNAIRE - PHQ9: SUM OF ALL RESPONSES TO PHQ QUESTIONS 1-9: 1

## 2018-10-29 ENCOUNTER — MYC REFILL (OUTPATIENT)
Dept: FAMILY MEDICINE | Facility: CLINIC | Age: 33
End: 2018-10-29

## 2018-10-29 DIAGNOSIS — F41.1 GAD (GENERALIZED ANXIETY DISORDER): ICD-10-CM

## 2018-10-29 DIAGNOSIS — F90.9 ATTENTION DEFICIT HYPERACTIVITY DISORDER (ADHD), UNSPECIFIED ADHD TYPE: ICD-10-CM

## 2018-10-29 DIAGNOSIS — F33.42 RECURRENT MAJOR DEPRESSIVE DISORDER, IN FULL REMISSION (H): ICD-10-CM

## 2018-10-30 RX ORDER — LISDEXAMFETAMINE DIMESYLATE 50 MG/1
50 CAPSULE ORAL EVERY MORNING
Qty: 30 CAPSULE | Refills: 0 | Status: SHIPPED | OUTPATIENT
Start: 2018-10-30 | End: 2018-11-25

## 2018-10-30 NOTE — TELEPHONE ENCOUNTER
Message from [employer]:  Original authorizing provider: MD Manjula Grajeda would like a refill of the following medications:  lisdexamfetamine (VYVANSE) 50 MG capsule [Ray Johnson MD]  sertraline (ZOLOFT) 50 MG tablet [Ray Johnson MD]    Preferred pharmacy: [facility] [address] [place] [address] [address]VE [address] AT [facility] [address]    Comment:  Hello, I need to refill my prescriptions for the Zoloft and Vyvanse. Thank you!

## 2018-10-30 NOTE — TELEPHONE ENCOUNTER
lisdexamfetamine (VYVANSE) 50 MG capsule  Last Written Prescription Date:  9/28/2018  Last Fill Quantity: 30,  # refills: 0   Last office visit: 7/26/2018 with prescribing provider:  Elizabeth   Future Office Visit:  None      REFILL REQUEST FOR ZOLOFT IS TOO EARLY - Please inform patient that he was give 4 month's worth of this medication at beginning of October (#90 with 1 refill)    sertraline (ZOLOFT) 50 MG tablet  Last Written Prescription Date:  10/5/2018  Last Fill Quantity: 90,  # refills: 1   Last office visit: 7/26/2018 with prescribing provider:  Elizabeth Gonzalez Office Visit:      Routing refill request to provider for review/approval because:  Drug not on the G, P or [facility] refill protocol or controlled substance

## 2018-11-25 ENCOUNTER — MYC REFILL (OUTPATIENT)
Dept: FAMILY MEDICINE | Facility: CLINIC | Age: 33
End: 2018-11-25

## 2018-11-25 DIAGNOSIS — F90.9 ATTENTION DEFICIT HYPERACTIVITY DISORDER (ADHD), UNSPECIFIED ADHD TYPE: ICD-10-CM

## 2018-11-26 NOTE — TELEPHONE ENCOUNTER
Message from [employer]:  Original authorizing provider: MD Manjula Grajeda would like a refill of the following medications:  lisdexamfetamine (VYVANSE) 50 MG capsule [Ray Johnson MD]    Preferred pharmacy: [facility] [address] AT [facility] & [facility]    Comment:

## 2018-11-26 NOTE — TELEPHONE ENCOUNTER
Lisdexamfetamine 50 mg    Last Written Prescription Date:  10/30/18  Last Fill Quantity: 30 capsules,  # refills: 0   Last office visit: 7/26/2018 with prescribing provider:  Elizabeth   Future Office Visit:      Routing refill request to provider for review/approval because:  Drug not on the [facility], [facility] or [facility] refill protocol or controlled substance

## 2018-11-27 ENCOUNTER — MYC MEDICAL ADVICE (OUTPATIENT)
Dept: FAMILY MEDICINE | Facility: CLINIC | Age: 33
End: 2018-11-27

## 2018-11-27 RX ORDER — LISDEXAMFETAMINE DIMESYLATE 50 MG/1
50 CAPSULE ORAL EVERY MORNING
Qty: 30 CAPSULE | Refills: 0 | Status: SHIPPED | OUTPATIENT
Start: 2019-01-27 | End: 2019-02-27

## 2018-11-27 RX ORDER — LISDEXAMFETAMINE DIMESYLATE 50 MG/1
50 CAPSULE ORAL EVERY MORNING
Qty: 30 CAPSULE | Refills: 0 | Status: SHIPPED | OUTPATIENT
Start: 2018-11-27 | End: 2018-11-27

## 2018-11-27 RX ORDER — LISDEXAMFETAMINE DIMESYLATE 50 MG/1
50 CAPSULE ORAL EVERY MORNING
Qty: 30 CAPSULE | Refills: 0 | Status: SHIPPED | OUTPATIENT
Start: 2018-12-27 | End: 2018-11-27

## 2018-11-27 NOTE — TELEPHONE ENCOUNTER
I have printed 3 separate prescriptions for a 3 month refill of Vyvanse and left it at the  for patient to  (Spotzer message sent to patient to inform him that refills are ready)

## 2018-11-27 NOTE — TELEPHONE ENCOUNTER
Patient is followed by SELVIN BULLOCK for ongoing prescription of stimulants.  All refills should be approved by this provider, or covering partner.    Medication(s): lisdexamfetamine (VYVANSE) 50 MG capsule.   Maximum quantity per month: 30  Clinic visit frequency required: Q 6  months     Controlled substance agreement on file: Yes       Date(s): 3/9/17  Neuropsych evaluation for ADD completed:     Last [facility] website verification:  done on 11/27/18   [email or web address]

## 2018-11-30 ENCOUNTER — MYC MEDICAL ADVICE (OUTPATIENT)
Dept: FAMILY MEDICINE | Facility: CLINIC | Age: 33
End: 2018-11-30

## 2019-02-26 ENCOUNTER — MYC REFILL (OUTPATIENT)
Dept: FAMILY MEDICINE | Facility: CLINIC | Age: 34
End: 2019-02-26

## 2019-02-26 DIAGNOSIS — F90.9 ATTENTION DEFICIT HYPERACTIVITY DISORDER (ADHD), UNSPECIFIED ADHD TYPE: ICD-10-CM

## 2019-02-26 DIAGNOSIS — F33.42 RECURRENT MAJOR DEPRESSIVE DISORDER, IN FULL REMISSION (H): ICD-10-CM

## 2019-02-26 DIAGNOSIS — F41.1 GAD (GENERALIZED ANXIETY DISORDER): ICD-10-CM

## 2019-02-26 RX ORDER — LISDEXAMFETAMINE DIMESYLATE 50 MG/1
50 CAPSULE ORAL EVERY MORNING
Qty: 30 CAPSULE | Refills: 0 | Status: CANCELLED | OUTPATIENT
Start: 2019-02-26

## 2019-02-27 ENCOUNTER — MYC REFILL (OUTPATIENT)
Dept: FAMILY MEDICINE | Facility: CLINIC | Age: 34
End: 2019-02-27

## 2019-02-27 DIAGNOSIS — F33.42 RECURRENT MAJOR DEPRESSIVE DISORDER, IN FULL REMISSION (H): ICD-10-CM

## 2019-02-27 DIAGNOSIS — F90.9 ATTENTION DEFICIT HYPERACTIVITY DISORDER (ADHD), UNSPECIFIED ADHD TYPE: ICD-10-CM

## 2019-02-27 DIAGNOSIS — F41.1 GAD (GENERALIZED ANXIETY DISORDER): ICD-10-CM

## 2019-02-28 ENCOUNTER — NURSE TRIAGE (OUTPATIENT)
Dept: NURSING | Facility: CLINIC | Age: 34
End: 2019-02-28

## 2019-02-28 NOTE — TELEPHONE ENCOUNTER
"sertraline (ZOLOFT) 50 MG tablet  Last Written Prescription Date:  10/30/18  Last Fill Quantity: 90 tablet,  # refills: 1   Last office visit: 7/26/2018 with prescribing provider:  Elizabeth   Future Office Visit:      [facility] Follow-up to PHQ 4/24/2018 7/26/2018 10/5/2018   PHQ-9 9. Suicide Ideation past 2 weeks Not at all Not at all Not at all     HOMA-7 SCORE 7/21/2017 10/27/2017 7/26/2018   Total Score 14 9 12       lisdexamfetamine (VYVANSE) 50 MG capsule       Last Written Prescription Date:  1/27/19  Last Fill Quantity: 30 capsule,   # refills: 0  Last Office Visit: 7/26/2018 (Elizabeth)  Future Office visit:       Routing refill request to provider for review/approval because:  Drug not on the G, [facility] or [facility] refill protocol or controlled substance      Requested Prescriptions   Pending Prescriptions Disp Refills     sertraline (ZOLOFT) 50 MG tablet 90 tablet 1     Sig: Take 1.5 tablets (75 mg) by mouth daily    SSRIs Protocol Failed - 2/26/2019 11:44 PM       Failed - Recent (6 mo) or future (30 days) visit within the authorizing provider's specialty    Patient had office visit in the last 6 months or has a visit in the next 30 days with authorizing provider or within the authorizing provider's specialty.  See \"Patient Info\" tab in inbasket, or \"Choose Columns\" in Meds & Orders section of the refill encounter.           Passed - PHQ-9 score less than 5 in past 6 months    Please review last PHQ-9 score.          Passed - Medication is active on med list       Passed - Patient is age 18 or older        lisdexamfetamine (VYVANSE) 50 MG capsule 30 capsule 0     Sig: Take 1 capsule (50 mg) by mouth every morning    There is no refill protocol information for this order          "

## 2019-02-28 NOTE — TELEPHONE ENCOUNTER
Pt is overdue for f/u appt for Medication review.     Called patient and left VM to return call to clinic    Daria JAIMES RN

## 2019-03-01 RX ORDER — LISDEXAMFETAMINE DIMESYLATE 50 MG/1
50 CAPSULE ORAL EVERY MORNING
Qty: 30 CAPSULE | Refills: 0 | Status: SHIPPED | OUTPATIENT
Start: 2019-03-01 | End: 2019-03-22

## 2019-03-01 NOTE — TELEPHONE ENCOUNTER
"Requested Prescriptions   Pending Prescriptions Disp Refills     sertraline (ZOLOFT) 50 MG tablet 90 tablet 1     Sig: Take 1.5 tablets (75 mg) by mouth daily    SSRIs Protocol Failed - 3/1/2019  8:35 AM       Failed - Recent (6 mo) or future (30 days) visit within the authorizing provider's specialty    Patient had office visit in the last 6 months or has a visit in the next 30 days with authorizing provider or within the authorizing provider's specialty.  See \"Patient Info\" tab in inbasket, or \"Choose Columns\" in Meds & Orders section of the refill encounter.           Passed - PHQ-9 score less than 5 in past 6 months    Please review last PHQ-9 score.          Passed - Medication is active on med list       Passed - Patient is age 18 or older        lisdexamfetamine (VYVANSE) 50 MG capsule 30 capsule 0     Sig: Take 1 capsule (50 mg) by mouth every morning    There is no refill protocol information for this order        PHQ-9 SCORE 4/24/2018 7/26/2018 10/5/2018   PHQ-9 Total Score - - -   PHQ-9 Total Score MyChart - - -   PHQ-9 Total Score 9 8 1     Last Rx: Vyvanse 50mg, #1 capsule by mouth every morning, #30 with 0 refills     Last Rx: Sertraline 50mg, 1.5 tablets by mouth daily #90 with 1 refill     Sertraline - Medication is being filled for 1 time refill only due to:  Patient needs to be seen because due for follow up OV . MyChart messages sent to notify due for OV and PHQ-9.     Routing refill request to provider for review/approval because:  Drug not on the [facility] refill protocol - Vyvanse   Last [facility] website verification:  2/28/2019   [email or web address]      Junie MARSHALL RN          "

## 2019-03-01 NOTE — TELEPHONE ENCOUNTER
Patient states he left a message for clinic re refill on 2 medications.   Per Epic chart this RN notes 2/27/19 [facility] Refill is 'open' specific to refills on sertraline and lisdexamfetamine.   Patient verifies he is calling about the 2 medications.  No triage completed.     Protocol- Medication Call- Adult    Care advice reviewed.   Disposition- Information given    Advised to follow up with PCP tomorrow 3/1/19 re medication refills.  Caller states understanding of the recommended disposition. States he will call tomorrow.  Advised to call back if further questions or concerns.     BOGDAN Berry RN  [facility]     Reason for Disposition    Caller requesting a NON-URGENT new prescription or refill and triager unable to refill per unit policy    Protocols used: MEDICATION QUESTION CALL-ADULT-

## 2019-03-22 ENCOUNTER — OFFICE VISIT (OUTPATIENT)
Dept: FAMILY MEDICINE | Facility: CLINIC | Age: 34
End: 2019-03-22
Payer: COMMERCIAL

## 2019-03-22 VITALS
HEART RATE: 83 BPM | BODY MASS INDEX: 30.31 KG/M2 | OXYGEN SATURATION: 98 % | SYSTOLIC BLOOD PRESSURE: 136 MMHG | TEMPERATURE: 97.3 F | DIASTOLIC BLOOD PRESSURE: 82 MMHG | HEIGHT: 70 IN | WEIGHT: 211.7 LBS

## 2019-03-22 DIAGNOSIS — L30.9 DERMATITIS: ICD-10-CM

## 2019-03-22 DIAGNOSIS — F32.0 MILD MAJOR DEPRESSION (H): Primary | ICD-10-CM

## 2019-03-22 DIAGNOSIS — F41.1 GAD (GENERALIZED ANXIETY DISORDER): ICD-10-CM

## 2019-03-22 DIAGNOSIS — F90.9 ATTENTION DEFICIT HYPERACTIVITY DISORDER (ADHD), UNSPECIFIED ADHD TYPE: ICD-10-CM

## 2019-03-22 PROCEDURE — 99214 OFFICE O/P EST MOD 30 MIN: CPT | Performed by: INTERNAL MEDICINE

## 2019-03-22 RX ORDER — CLOBETASOL PROPIONATE 0.5 MG/G
CREAM TOPICAL 2 TIMES DAILY PRN
Qty: 30 G | Refills: 0 | Status: SHIPPED | OUTPATIENT
Start: 2019-03-22 | End: 2019-09-25

## 2019-03-22 RX ORDER — DEXTROAMPHETAMINE SACCHARATE, AMPHETAMINE ASPARTATE, DEXTROAMPHETAMINE SULFATE AND AMPHETAMINE SULFATE 3.75; 3.75; 3.75; 3.75 MG/1; MG/1; MG/1; MG/1
15 TABLET ORAL DAILY
Qty: 30 TABLET | Refills: 0 | Status: SHIPPED | OUTPATIENT
Start: 2019-04-22 | End: 2019-03-22

## 2019-03-22 RX ORDER — LISDEXAMFETAMINE DIMESYLATE 50 MG/1
50 CAPSULE ORAL EVERY MORNING
Qty: 30 CAPSULE | Refills: 0 | Status: SHIPPED | OUTPATIENT
Start: 2019-05-22 | End: 2019-06-26

## 2019-03-22 RX ORDER — DEXTROAMPHETAMINE SACCHARATE, AMPHETAMINE ASPARTATE, DEXTROAMPHETAMINE SULFATE AND AMPHETAMINE SULFATE 3.75; 3.75; 3.75; 3.75 MG/1; MG/1; MG/1; MG/1
15 TABLET ORAL DAILY
Qty: 30 TABLET | Refills: 0 | Status: SHIPPED | OUTPATIENT
Start: 2019-05-22 | End: 2019-06-26

## 2019-03-22 RX ORDER — LISDEXAMFETAMINE DIMESYLATE 50 MG/1
50 CAPSULE ORAL EVERY MORNING
Qty: 30 CAPSULE | Refills: 0 | Status: SHIPPED | OUTPATIENT
Start: 2019-04-22 | End: 2019-03-22

## 2019-03-22 RX ORDER — LISDEXAMFETAMINE DIMESYLATE 50 MG/1
50 CAPSULE ORAL EVERY MORNING
Qty: 30 CAPSULE | Refills: 0 | Status: SHIPPED | OUTPATIENT
Start: 2019-03-22 | End: 2019-03-22

## 2019-03-22 RX ORDER — DEXTROAMPHETAMINE SACCHARATE, AMPHETAMINE ASPARTATE, DEXTROAMPHETAMINE SULFATE AND AMPHETAMINE SULFATE 3.75; 3.75; 3.75; 3.75 MG/1; MG/1; MG/1; MG/1
15 TABLET ORAL DAILY
Qty: 30 TABLET | Refills: 0 | Status: SHIPPED | OUTPATIENT
Start: 2019-03-22 | End: 2019-03-22

## 2019-03-22 ASSESSMENT — MIFFLIN-ST. JEOR: SCORE: 1898.57

## 2019-03-22 NOTE — PATIENT INSTRUCTIONS
May take immediate release Adderall in the afternoon as needed if your attention deficit worsens.    Call doctor if your symptoms persist/worsens, or if you develop new symptoms or side effects from the medication/s.    Update doctor on how your attention deficit, depression, anxiety are doing.

## 2019-03-22 NOTE — PROGRESS NOTES
HPI    SUBJECTIVE:   Manjula Dorman is a 34 year old male who presents to clinic today for the following health issues:      I last saw the patient at the clinic on 7/26/2018 for his depression and attention deficit disorder.    Since his last clinic visit, his depression has progressed.  His baby girl was born on September 2018 and the adjustment to having a child has had its challenges.  He continues to be on sertraline 25 mg daily.  He is interested in counseling rather than increasing the dose of the medication.    His attention deficit disorder is doing better on Vyvanse (which we previously increased from 30 mg to 50 mg).  He is tolerating the higher dose of the medication well.  Despite the increase of the dose of the Vyvanse, he still feels that it does not last long enough into the latter part of the afternoon.    Also has dermatitis for which he uses clobetasol.  He needs a refill for the medication.      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   Cardiovascular: Negative for chest pain and palpitations.   Gastrointestinal: Negative for abdominal pain, constipation, diarrhea, nausea and vomiting.   Neurological: Negative for dizziness, tremors and headaches.   Psychiatric/Behavioral: Positive for depression. Negative for hallucinations and suicidal ideas. The patient is  "nervous/anxious. The patient does not have insomnia.        /82 (BP Location: Left arm, Patient Position: Sitting, Cuff Size: Adult Regular)   Pulse 83   Temp 97.3  F (36.3  C) (Oral)   Ht 1.765 m (5' 9.5\")   Wt 96 kg (211 lb 11.2 oz)   SpO2 98%   BMI 30.81 kg/m        Physical Exam   Constitutional: He is oriented to person, place, and time. No distress.   Neck: No thyromegaly present.   Cardiovascular: Normal rate, regular rhythm and normal heart sounds.   Pulmonary/Chest: Effort normal and breath sounds normal. No respiratory distress.   Neurological: He is alert and oriented to person, place, and time. Coordination normal.   Psychiatric: He has a normal mood and affect.   Vitals reviewed.        ICD-10-CM    1. Mild major depression (H) F32.0 sertraline (ZOLOFT) 50 MG tablet     MENTAL HEALTH REFERRAL  - Adult; Outpatient Treatment; Individual/Couples/Family/Group Therapy/Health Psychology; [facility]: [facility] [phone]; We will contact you to schedule the appointment or please call with any questions     DEPRESSION ACTION PLAN (DAP)   2. Attention deficit hyperactivity disorder (ADHD), unspecified ADHD type F90.9 amphetamine-dextroamphetamine (ADDERALL) 15 MG tablet     lisdexamfetamine (VYVANSE) 50 MG capsule     DISCONTINUED: lisdexamfetamine (VYVANSE) 50 MG capsule     DISCONTINUED: amphetamine-dextroamphetamine (ADDERALL) 15 MG tablet     DISCONTINUED: amphetamine-dextroamphetamine (ADDERALL) 15 MG tablet     DISCONTINUED: lisdexamfetamine (VYVANSE) 50 MG capsule   3. HOMA (generalized anxiety disorder) F41.1 sertraline (ZOLOFT) 50 MG tablet     MENTAL HEALTH REFERRAL  - Adult; Outpatient Treatment; Individual/Couples/Family/Group Therapy/Health Psychology; [facility]: [facility] [phone]; We will contact you to schedule the appointment or please call with any questions   4. Dermatitis L30.9 clobetasol (CLOBETASOL PROPIONATE EMULSION) 0.05 % CREA cream       #6 " printed prescriptions for controlled medication were personally handed over to the patient at the conclusion of the visit      Patient Instructions   May take immediate release Adderall in the afternoon as needed if your attention deficit worsens.    Call doctor if your symptoms persist/worsens, or if you develop new symptoms or side effects from the medication/s.    Update doctor on how your attention deficit, depression, anxiety are doing.

## 2019-03-22 NOTE — LETTER
My Depression Action Plan  Name: Manjula Dorman   Date of Birth 1985  Date: 4/7/2019    My doctor: Ray oJhnson   My clinic: [place] [address] [address] [facility] [address]  [phone]          GREEN    ZONE   Good Control    What it looks like:     Things are going generally well. You have normal up s and down s. You may even feel depressed from time to time, but bad moods usually last less than a day.   What you need to do:  1. Continue to care for yourself (see self care plan)  2. Check your depression survival kit and update it as needed  3. Follow your physician s recommendations including any medication.  4. Do not stop taking medication unless you consult with your physician first.           YELLOW         ZONE Getting Worse    What it looks like:     Depression is starting to interfere with your life.     It may be hard to get out of bed; you may be starting to isolate yourself from others.    Symptoms of depression are starting to last most all day and this has happened for several days.     You may have suicidal thoughts but they are not constant.   What you need to do:     1. Call your care team, your response to treatment will improve if you keep your care team informed of your progress. Yellow periods are signs an adjustment may need to be made.     2. Continue your self-care, even if you have to fake it!    3. Talk to someone in your support network    4. Open up your depression survival kit           RED    ZONE Medical Alert - Get Help    What it looks like:     Depression is seriously interfering with your life.     You may experience these or other symptoms: You can t get out of bed most days, can t work or engage in other necessary activities, you have trouble taking care of basic hygiene, or basic responsibilities, thoughts of suicide or death that will not go away, self-injurious behavior.     What you need to do:  1. Call your care team  and request a same-day appointment. If they are not available (weekends or after hours) call your local crisis line, emergency room or 911.            Depression Self Care Plan / Survival Kit    Self-Care for Depression  Here s the deal. Your body and mind are really not as separate as most people think.  What you do and think affects how you feel and how you feel influences what you do and think. This means if you do things that people who feel good do, it will help you feel better.  Sometimes this is all it takes.  There is also a place for medication and therapy depending on how severe your depression is, so be sure to consult with your medical provider and/ or Behavioral Health Consultant if your symptoms are worsening or not improving.     In order to better manage my stress, I will:    Exercise  Get some form of exercise, every day. This will help reduce pain and release endorphins, the  feel good  chemicals in your brain. This is almost as good as taking antidepressants!  This is not the same as joining a gym and then never going! (they count on that by the way ) It can be as simple as just going for a walk or doing some gardening, anything that will get you moving.      Hygiene   Maintain good hygiene (Get out of bed in the morning, Make your bed, Brush your teeth, Take a shower, and Get dressed like you were going to work, even if you are unemployed).  If your clothes don't fit try to get ones that do.    Diet  I will strive to eat foods that are good for me, drink plenty of water, and avoid excessive sugar, caffeine, alcohol, and other mood-altering substances.  Some foods that are helpful in depression are: complex carbohydrates, B vitamins, flaxseed, fish or fish oil, fresh fruits and vegetables.    Psychotherapy  I agree to participate in Individual Therapy (if recommended).    Medication  If prescribed medications, I agree to take them.  Missing doses can result in serious side effects.  I understand  that drinking alcohol, or other illicit drug use, may cause potential side effects.  I will not stop my medication abruptly without first discussing it with my provider.    Staying Connected With Others  I will stay in touch with my friends, family members, and my primary care provider/team.    Use your imagination  Be creative.  We all have a creative side; it doesn t matter if it s oil painting, sand castles, or mud pies! This will also kick up the endorphins.    Witness Beauty  (AKA stop and smell the roses) Take a look outside, even in mid-winter. Notice colors, textures. Watch the squirrels and birds.     Service to others  Be of service to others.  There is always someone else in need.  By helping others we can  get out of ourselves  and remember the really important things.  This also provides opportunities for practicing all the other parts of the program.    Humor  Laugh and be silly!  Adjust your TV habits for less news and crime-drama and more comedy.    Control your stress  Try breathing deep, massage therapy, biofeedback, and meditation. Find time to relax each day.     My support system    Clinic Contact:  Phone number:    Contact 1:  Phone number:    Contact 2:  Phone number:    Pentecostal/:  Phone number:    Therapist:  Phone number:    Local crisis center:    Phone number:    Other community support:  Phone number:

## 2019-04-07 ASSESSMENT — ENCOUNTER SYMPTOMS
PALPITATIONS: 0
INSOMNIA: 0
VOMITING: 0
DIARRHEA: 0
DEPRESSION: 1
DIZZINESS: 0
HALLUCINATIONS: 0
HEADACHES: 0
CONSTIPATION: 0
WEIGHT LOSS: 0
NERVOUS/ANXIOUS: 1
NAUSEA: 0
TREMORS: 0
ABDOMINAL PAIN: 0

## 2019-04-30 ENCOUNTER — TELEPHONE (OUTPATIENT)
Dept: FAMILY MEDICINE | Facility: CLINIC | Age: 34
End: 2019-04-30

## 2019-04-30 NOTE — TELEPHONE ENCOUNTER
Prior Authorization Retail Medication Request    Medication/Dose: vyvanse 50 mg  ICD code (if different than what is on RX):  f90.9  Previously Tried and Failed:  adderrall 30 mg, adderral 10 mg  Rationale:  Stable on current medication    Insurance Name:  [employer] care  Insurance ID:  [id number]      Pharmacy Information (if different than what is on RX)  Name:  [facility]  Phone: [phone]    CoverMeds key [id number]

## 2019-05-02 DIAGNOSIS — F90.9 ATTENTION DEFICIT HYPERACTIVITY DISORDER (ADHD), UNSPECIFIED ADHD TYPE: ICD-10-CM

## 2019-05-02 NOTE — TELEPHONE ENCOUNTER
"Clinic Action Needed:Yes please check with MD. Patient states he's been waiting for over a week to hear back and is almost out  Reason for Call:I am almost out of   lisdexamfetamine (VYVANSE) 50 MG capsule 30 capsule 0 5/22/2019  No   Sig - Route: Take 1 capsule (50 mg) by mouth every morning - Oral     \"I have not heard back from the clinic.\"  Patient Recommendations/Teaching:I will route message to PCP  Routed to:PCP  Ankita Guillaume RN [facility]          "

## 2019-05-03 NOTE — TELEPHONE ENCOUNTER
Last [facility] website verification:  5/3/19   [email or web address]    Last Rx printed 3/22/19 with start date of 5/22/19  Rx printed 3/22/19 was filled on 4/1/19 per  (last filled for 30 day)   Prior fills: 3/5/19, 2/2/19, 12/29/18    Routing refill request to provider for review/approval because:  Drug not on the [facility] refill protocol     Junie MARSHALL RN

## 2019-05-03 NOTE — TELEPHONE ENCOUNTER
Pending Prescriptions:                       Disp   Refills    lisdexamfetamine (VYVANSE) 50 MG capsule  30 cap*0            Sig: Take 1 capsule (50 mg) by mouth every morning    Last Written Prescription Date:  05/22/2019  Last Fill Quantity: 30,  # refills: 0   Last office visit: 3/22/2019 with prescribing provider:     Future Office Visit:       Routing refill request to provider for review/approval because:  Drug not on the G, P or [facility] refill protocol or controlled substance

## 2019-05-03 NOTE — TELEPHONE ENCOUNTER
Prior Authorization Approval    Authorization Effective Date: 4/3/2019  Authorization Expiration Date: 5/2/2020  Medication: vyvanse - APPROVED  Approved Dose/Quantity: 30 FOR 30  Reference #:     Insurance Company: [employer] - Phone [phone] Fax [phone]  Expected CoPay:       CoPay Card Available:      Foundation Assistance Needed:    Which Pharmacy is filling the prescription (Not needed for infusion/clinic administered): [facility] [address] [place] [address] [address]  Pharmacy Notified: Yes  Patient Notified: Yes

## 2019-05-03 NOTE — TELEPHONE ENCOUNTER
Patient called said he is on his last pill and needs the VYVANSE refilled       [facility] [id number] [place] [address] [address] AT [facility] & [address]  WRITTEN PRESCRIPTION REQUESTED    Please call pt when this is ready  [phone]

## 2019-05-03 NOTE — TELEPHONE ENCOUNTER
3 separate monthly prescriptions for Vyvanse were issued during patient's previous clinic visit in March 22 so he should still be good until June 22.  I documented that patient was given 6 printed prescriptions (3 for Vyvanse and 3 for Adderall) at the end of the clinic visit.  If prescriptions were lost/misplaced/stolen, there will be no replacement until after June 22.

## 2019-05-06 RX ORDER — LISDEXAMFETAMINE DIMESYLATE 50 MG/1
50 CAPSULE ORAL EVERY MORNING
Qty: 30 CAPSULE | Refills: 0 | OUTPATIENT
Start: 2019-05-22

## 2019-05-19 ENCOUNTER — HOSPITAL ENCOUNTER (EMERGENCY)
Facility: CLINIC | Age: 34
Discharge: HOME | End: 2019-05-19
Attending: EMERGENCY MEDICINE | Admitting: EMERGENCY MEDICINE
Payer: COMMERCIAL

## 2019-05-19 ENCOUNTER — APPOINTMENT (OUTPATIENT)
Dept: GENERAL RADIOLOGY | Facility: CLINIC | Age: 34
End: 2019-05-19
Attending: EMERGENCY MEDICINE
Payer: COMMERCIAL

## 2019-05-19 VITALS
DIASTOLIC BLOOD PRESSURE: 83 MMHG | HEIGHT: 70 IN | SYSTOLIC BLOOD PRESSURE: 140 MMHG | RESPIRATION RATE: 16 BRPM | TEMPERATURE: 99.2 F | BODY MASS INDEX: 31.44 KG/M2 | WEIGHT: 219.6 LBS | OXYGEN SATURATION: 100 %

## 2019-05-19 DIAGNOSIS — S40.022A CONTUSION OF LEFT UPPER EXTREMITY, INITIAL ENCOUNTER: ICD-10-CM

## 2019-05-19 PROCEDURE — 73060 X-RAY EXAM OF HUMERUS: CPT | Mod: LT

## 2019-05-19 PROCEDURE — 29105 APPLICATION LONG ARM SPLINT: CPT | Mod: LT

## 2019-05-19 PROCEDURE — 99284 EMERGENCY DEPT VISIT MOD MDM: CPT | Mod: 25

## 2019-05-19 PROCEDURE — 73090 X-RAY EXAM OF FOREARM: CPT | Mod: LT

## 2019-05-19 RX ORDER — HYDROCODONE BITARTRATE AND ACETAMINOPHEN 5; 325 MG/1; MG/1
1 TABLET ORAL EVERY 6 HOURS PRN
Qty: 10 TABLET | Refills: 0 | Status: SHIPPED | OUTPATIENT
Start: 2019-05-19 | End: 2019-11-14

## 2019-05-19 ASSESSMENT — ENCOUNTER SYMPTOMS
MYALGIAS: 1
NECK PAIN: 0
ARTHRALGIAS: 1
BACK PAIN: 0
ABDOMINAL PAIN: 0

## 2019-05-19 ASSESSMENT — MIFFLIN-ST. JEOR: SCORE: 1942.35

## 2019-05-19 NOTE — ED AVS SNAPSHOT
Emergency Department  [address] [place] [address]  Phone:  [phone]  Fax:  [phone]                                    Manjula Dorman   MRN: [id number]    Department:   Emergency Department   Date of Visit:  5/19/2019           After Visit Summary Signature Page    I have received my discharge instructions, and my questions have been answered. I have discussed any challenges I see with this plan with the nurse or doctor.    ..........................................................................................................................................  Patient/Patient Representative Signature      ..........................................................................................................................................  Patient Representative Print Name and Relationship to Patient    ..................................................               ................................................  Date                                   Time    ..........................................................................................................................................  Reviewed by Signature/Title    ...................................................              ..............................................  Date                                               Time          22EPIC Rev 08/18

## 2019-05-19 NOTE — LETTER
May 19, 2019      To Whom It May Concern:      Manjula Dorman was seen in our Emergency Department today, 05/19/19.  I expect his condition to improve over the next 2 days.  He may return to work/school when improved.    Sincerely,        Kishor Harrison MD
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
## 2019-06-26 ENCOUNTER — MYC REFILL (OUTPATIENT)
Dept: FAMILY MEDICINE | Facility: CLINIC | Age: 34
End: 2019-06-26

## 2019-06-26 DIAGNOSIS — F90.9 ATTENTION DEFICIT HYPERACTIVITY DISORDER (ADHD), UNSPECIFIED ADHD TYPE: ICD-10-CM

## 2019-06-27 ENCOUNTER — MYC MEDICAL ADVICE (OUTPATIENT)
Dept: FAMILY MEDICINE | Facility: CLINIC | Age: 34
End: 2019-06-27

## 2019-06-27 RX ORDER — LISDEXAMFETAMINE DIMESYLATE 50 MG/1
50 CAPSULE ORAL EVERY MORNING
Qty: 30 CAPSULE | Refills: 0 | Status: SHIPPED | OUTPATIENT
Start: 2019-06-27 | End: 2019-09-25

## 2019-06-27 RX ORDER — LISDEXAMFETAMINE DIMESYLATE 50 MG/1
50 CAPSULE ORAL EVERY MORNING
Qty: 30 CAPSULE | Refills: 0 | Status: SHIPPED | OUTPATIENT
Start: 2019-07-27 | End: 2019-11-14

## 2019-06-27 RX ORDER — LISDEXAMFETAMINE DIMESYLATE 50 MG/1
50 CAPSULE ORAL EVERY MORNING
Qty: 30 CAPSULE | Refills: 0 | Status: SHIPPED | OUTPATIENT
Start: 2019-08-27 | End: 2019-11-14

## 2019-06-27 RX ORDER — DEXTROAMPHETAMINE SACCHARATE, AMPHETAMINE ASPARTATE, DEXTROAMPHETAMINE SULFATE AND AMPHETAMINE SULFATE 3.75; 3.75; 3.75; 3.75 MG/1; MG/1; MG/1; MG/1
15 TABLET ORAL DAILY
Qty: 30 TABLET | Refills: 0 | Status: SHIPPED | OUTPATIENT
Start: 2019-08-27 | End: 2019-11-14

## 2019-06-27 RX ORDER — DEXTROAMPHETAMINE SACCHARATE, AMPHETAMINE ASPARTATE, DEXTROAMPHETAMINE SULFATE AND AMPHETAMINE SULFATE 3.75; 3.75; 3.75; 3.75 MG/1; MG/1; MG/1; MG/1
15 TABLET ORAL DAILY
Qty: 30 TABLET | Refills: 0 | Status: SHIPPED | OUTPATIENT
Start: 2019-06-27 | End: 2019-11-14

## 2019-06-27 RX ORDER — DEXTROAMPHETAMINE SACCHARATE, AMPHETAMINE ASPARTATE, DEXTROAMPHETAMINE SULFATE AND AMPHETAMINE SULFATE 3.75; 3.75; 3.75; 3.75 MG/1; MG/1; MG/1; MG/1
15 TABLET ORAL DAILY
Qty: 30 TABLET | Refills: 0 | Status: SHIPPED | OUTPATIENT
Start: 2019-07-27 | End: 2019-09-25

## 2019-06-27 NOTE — TELEPHONE ENCOUNTER
" checked 5/3/19 with no concerns.    Per [employer] message   \"I submitted a request for refilling my Vyvanse and Adderall prescriptions. Would I be able to get the 3 scripts written as Dr. Johnson previously did? That was extremely helpful. I do need to refill by July 1st as my benefits end that day.\"    Are you willing to provide 3 scripts for Vyvanse and Adderall?    Thank you,  Timmy FITZPATRICK RN    "

## 2019-09-25 ENCOUNTER — MYC REFILL (OUTPATIENT)
Dept: FAMILY MEDICINE | Facility: CLINIC | Age: 34
End: 2019-09-25

## 2019-09-25 DIAGNOSIS — L30.9 DERMATITIS: ICD-10-CM

## 2019-09-25 DIAGNOSIS — F41.1 GAD (GENERALIZED ANXIETY DISORDER): ICD-10-CM

## 2019-09-25 DIAGNOSIS — F90.9 ATTENTION DEFICIT HYPERACTIVITY DISORDER (ADHD), UNSPECIFIED ADHD TYPE: ICD-10-CM

## 2019-09-25 DIAGNOSIS — F32.0 MILD MAJOR DEPRESSION (H): ICD-10-CM

## 2019-09-25 RX ORDER — DEXTROAMPHETAMINE SACCHARATE, AMPHETAMINE ASPARTATE, DEXTROAMPHETAMINE SULFATE AND AMPHETAMINE SULFATE 3.75; 3.75; 3.75; 3.75 MG/1; MG/1; MG/1; MG/1
15 TABLET ORAL DAILY
Qty: 30 TABLET | Refills: 0 | Status: CANCELLED | OUTPATIENT
Start: 2019-09-25

## 2019-09-25 RX ORDER — LISDEXAMFETAMINE DIMESYLATE 50 MG/1
50 CAPSULE ORAL EVERY MORNING
Qty: 30 CAPSULE | Refills: 0 | Status: CANCELLED | OUTPATIENT
Start: 2019-09-25

## 2019-09-27 RX ORDER — LISDEXAMFETAMINE DIMESYLATE 50 MG/1
50 CAPSULE ORAL EVERY MORNING
Qty: 30 CAPSULE | Refills: 0 | Status: SHIPPED | OUTPATIENT
Start: 2019-09-27 | End: 2019-10-30

## 2019-09-27 RX ORDER — DEXTROAMPHETAMINE SACCHARATE, AMPHETAMINE ASPARTATE, DEXTROAMPHETAMINE SULFATE AND AMPHETAMINE SULFATE 3.75; 3.75; 3.75; 3.75 MG/1; MG/1; MG/1; MG/1
15 TABLET ORAL DAILY
Qty: 30 TABLET | Refills: 0 | Status: SHIPPED | OUTPATIENT
Start: 2019-09-27 | End: 2019-10-30

## 2019-09-27 RX ORDER — CLOBETASOL PROPIONATE 0.5 MG/G
CREAM TOPICAL 2 TIMES DAILY PRN
Qty: 30 G | Refills: 0 | Status: SHIPPED | OUTPATIENT
Start: 2019-09-27 | End: 2020-05-29

## 2019-09-27 NOTE — TELEPHONE ENCOUNTER
"Adderall 15 mg       Last Written Prescription Date:  3 one month Rxs written 6/27/19. For fill on/after 6/27, 7/27, 8/27  Last Fill Quantity: #30,   # refills: 0    Vyvase 50 mg   Last Written Prescription Date:  3 one month Rxs written 6/27/19. For fill on/after 6/27, 7/27, 8/27  Last Fill Quantity: #30,   # refills: 0    Last Office Visit: 3/22/19, follow up 6 months - [employer] message sent to notify pt of e-scribe process AND due for OV now and PHQ-9      checked - 9/27/19     Routing refill request to provider for review/approval because:  Adderall, Vyvanse Drugs not on the [facility], [facility] or [facility] refill protocol or controlled substance  Sertraline - last PHQ-9 high  Clobetasol - not on refill protocol     PHQ-9 SCORE 7/26/2018 10/5/2018 3/1/2019   PHQ-9 Total Score - - -   PHQ-9 Total Score [employer] - - 9 (Mild depression)   PHQ-9 Total Score 8 1 9         Requested Prescriptions   Pending Prescriptions Disp Refills     clobetasol (CLOBETASOL PROPIONATE EMULSION) 0.05 % CREA cream 30 g 0     Sig: Apply topically 2 times daily as needed Do not use for more than 14 consecutive days       There is no refill protocol information for this order        sertraline (ZOLOFT) 50 MG tablet 135 tablet 1     Sig: Take 1.5 tablets (75 mg) by mouth daily       SSRIs Protocol Failed - 9/26/2019  1:48 PM        Failed - PHQ-9 score less than 5 in past 6 months     Please review last PHQ-9 score.           Failed - Recent (6 mo) or future (30 days) visit within the authorizing provider's specialty     Patient had office visit in the last 6 months or has a visit in the next 30 days with authorizing provider or within the authorizing provider's specialty.  See \"Patient Info\" tab in inbasket, or \"Choose Columns\" in Meds & Orders section of the refill encounter.            Passed - Medication is active on med list        Passed - Patient is age 18 or older       Junie MARSHALL RN    "

## 2019-10-04 ENCOUNTER — HEALTH MAINTENANCE LETTER (OUTPATIENT)
Age: 34
End: 2019-10-04

## 2019-10-28 DIAGNOSIS — F32.0 MILD MAJOR DEPRESSION (H): ICD-10-CM

## 2019-10-28 DIAGNOSIS — F41.1 GAD (GENERALIZED ANXIETY DISORDER): ICD-10-CM

## 2019-10-29 NOTE — TELEPHONE ENCOUNTER
"sertraline (ZOLOFT) 50 MG tablet 45 tablet 0 9/27/2019     Last Written Prescription Date:  9/27/19  Last Fill Quantity: 45,  # refills: 0   Last office visit: 3/22/2019 with prescribing provider:  Elizabeth   Future Office Visit:  None    Requested Prescriptions   Pending Prescriptions Disp Refills     sertraline (ZOLOFT) 50 MG tablet 45 tablet 0     Sig: Take 1.5 tablets (75 mg) by mouth daily       SSRIs Protocol Failed - 10/28/2019  4:06 PM        Failed - PHQ-9 score less than 5 in past 6 months     Please review last PHQ-9 score.           Failed - Recent (6 mo) or future (30 days) visit within the authorizing provider's specialty     Patient had office visit in the last 6 months or has a visit in the next 30 days with authorizing provider or within the authorizing provider's specialty.  See \"Patient Info\" tab in inbasket, or \"Choose Columns\" in Meds & Orders section of the refill encounter.            Passed - Medication is active on med list        Passed - Patient is age 18 or older        [facility] Follow-up to PHQ 10/5/2018 3/1/2019 9/27/2019   PHQ-9 9. Suicide Ideation past 2 weeks Not at all Not at all Not at all         "

## 2019-10-30 ENCOUNTER — MYC REFILL (OUTPATIENT)
Dept: FAMILY MEDICINE | Facility: CLINIC | Age: 34
End: 2019-10-30

## 2019-10-30 DIAGNOSIS — F90.9 ATTENTION DEFICIT HYPERACTIVITY DISORDER (ADHD), UNSPECIFIED ADHD TYPE: ICD-10-CM

## 2019-10-30 NOTE — TELEPHONE ENCOUNTER
One month supply sent 9/27  Due for OV.  Future OV 11/14    Refill for one more month sent to pharmacy.  Shilpa Hawk RN

## 2019-10-31 RX ORDER — LISDEXAMFETAMINE DIMESYLATE 50 MG/1
50 CAPSULE ORAL EVERY MORNING
Qty: 30 CAPSULE | Refills: 0 | Status: SHIPPED | OUTPATIENT
Start: 2019-10-31 | End: 2019-11-14

## 2019-10-31 RX ORDER — DEXTROAMPHETAMINE SACCHARATE, AMPHETAMINE ASPARTATE, DEXTROAMPHETAMINE SULFATE AND AMPHETAMINE SULFATE 3.75; 3.75; 3.75; 3.75 MG/1; MG/1; MG/1; MG/1
15 TABLET ORAL DAILY
Qty: 30 TABLET | Refills: 0 | Status: SHIPPED | OUTPATIENT
Start: 2019-10-31 | End: 2019-11-14

## 2019-10-31 NOTE — TELEPHONE ENCOUNTER
Patient is followed by RAY BULLOCK for ongoing prescription of stimulants.  All refills should be approved by this provider, or covering partner.     Medication(s): lisdexamfetamine (VYVANSE) 50 MG capsule.   Maximum quantity per month: 30  Clinic visit frequency required: Q 6  months      Controlled substance agreement on file: Yes       Date(s): 3/9/17  Neuropsych evaluation for ADD completed:      Last [facility] website verification:  9/27/19 [place]       Next 5 appointments (look out 90 days)    Nov 14, 2019  9:00 AM CST  Office Visit with Ray Bullock MD  [facility] [address] [id number] [phone]           amphetamine-dextroamphetamine (ADDERALL) 15 MG tablet 30 tablet 0 9/27/2019  No   Sig - Route: Take 1 tablet (15 mg) by mouth daily Taken in the afternoon as needed - Oral     Please review and authorize if appropriate,     Thank you,   Daria [place] RN

## 2019-10-31 NOTE — TELEPHONE ENCOUNTER
Patient is followed by RAY BULLOCK for ongoing prescription of stimulants.  All refills should be approved by this provider, or covering partner.     Medication(s): lisdexamfetamine (VYVANSE) 50 MG capsule.   Maximum quantity per month: 30  Clinic visit frequency required: Q 6  months      Controlled substance agreement on file: Yes       Date(s): 3/9/17  Neuropsych evaluation for ADD completed:      Last [facility] website verification:  9/27/19 [place]       Last Written Prescription Date:  9/27/19  Last Fill Quantity: 30,   # refills: 0  Last Office Visit: 3/22/19  Future Office visit:    Next 5 appointments (look out 90 days)    Nov 14, 2019  9:00 AM CST  Office Visit with Ray Bullock MD  [facility] ([facility]) [address] [address]  [phone]           Routing refill request to provider for review/approval because:  Drug not on the [facility] or [facility] refill protocol or controlled substance

## 2019-11-13 ENCOUNTER — MYC MEDICAL ADVICE (OUTPATIENT)
Dept: FAMILY MEDICINE | Facility: CLINIC | Age: 34
End: 2019-11-13

## 2019-11-14 ENCOUNTER — OFFICE VISIT (OUTPATIENT)
Dept: FAMILY MEDICINE | Facility: CLINIC | Age: 34
End: 2019-11-14
Payer: COMMERCIAL

## 2019-11-14 VITALS
BODY MASS INDEX: 30.49 KG/M2 | SYSTOLIC BLOOD PRESSURE: 125 MMHG | HEIGHT: 70 IN | DIASTOLIC BLOOD PRESSURE: 78 MMHG | WEIGHT: 213 LBS | HEART RATE: 83 BPM | OXYGEN SATURATION: 98 % | TEMPERATURE: 98.4 F

## 2019-11-14 DIAGNOSIS — F32.0 MILD MAJOR DEPRESSION (H): Primary | ICD-10-CM

## 2019-11-14 DIAGNOSIS — F41.1 GAD (GENERALIZED ANXIETY DISORDER): ICD-10-CM

## 2019-11-14 DIAGNOSIS — F90.9 ATTENTION DEFICIT HYPERACTIVITY DISORDER (ADHD), UNSPECIFIED ADHD TYPE: ICD-10-CM

## 2019-11-14 DIAGNOSIS — R06.09 EXERTIONAL DYSPNEA: ICD-10-CM

## 2019-11-14 PROBLEM — R07.89 FEELING OF CHEST TIGHTNESS: Status: ACTIVE | Noted: 2019-10-07

## 2019-11-14 PROBLEM — R06.02 SOB (SHORTNESS OF BREATH): Status: ACTIVE | Noted: 2019-10-07

## 2019-11-14 PROBLEM — R41.3 MEMORY CHANGE: Status: ACTIVE | Noted: 2019-06-22

## 2019-11-14 PROCEDURE — 99214 OFFICE O/P EST MOD 30 MIN: CPT | Performed by: INTERNAL MEDICINE

## 2019-11-14 RX ORDER — LISDEXAMFETAMINE DIMESYLATE 50 MG/1
50 CAPSULE ORAL EVERY MORNING
Qty: 30 CAPSULE | Refills: 0 | Status: SHIPPED | OUTPATIENT
Start: 2019-12-14 | End: 2020-02-25

## 2019-11-14 RX ORDER — DEXTROAMPHETAMINE SACCHARATE, AMPHETAMINE ASPARTATE, DEXTROAMPHETAMINE SULFATE AND AMPHETAMINE SULFATE 3.75; 3.75; 3.75; 3.75 MG/1; MG/1; MG/1; MG/1
15 TABLET ORAL 2 TIMES DAILY
Qty: 60 TABLET | Refills: 0 | Status: SHIPPED | OUTPATIENT
Start: 2019-11-14 | End: 2020-02-25

## 2019-11-14 RX ORDER — LISDEXAMFETAMINE DIMESYLATE 50 MG/1
50 CAPSULE ORAL EVERY MORNING
Qty: 30 CAPSULE | Refills: 0 | Status: SHIPPED | OUTPATIENT
Start: 2020-01-14 | End: 2020-02-25

## 2019-11-14 RX ORDER — LISDEXAMFETAMINE DIMESYLATE 50 MG/1
50 CAPSULE ORAL EVERY MORNING
Qty: 30 CAPSULE | Refills: 0 | Status: SHIPPED | OUTPATIENT
Start: 2019-11-14 | End: 2020-02-25

## 2019-11-14 ASSESSMENT — MIFFLIN-ST. JEOR: SCORE: 1912.41

## 2019-11-14 NOTE — PROGRESS NOTES
Subjective     Manjula Dorman is a 34 year old male who presents to clinic today for the following health issues:    HPI   Medication Followup of Sertraline, Adderall and Vyvanse    Taking Medication as prescribed: yes    Side Effects:  Fatigue in afternoon, not as quick with processing, forgetfulness    Medication Helping Symptoms:  yes       Patient continues to be on Vyvanse 50 mg in the morning and Adderall 15 mg as needed in the afternoon.  He notices that during certain late afternoons his focus would not be as sharp as it is earlier in the day.    Depression remains stable on sertraline.    Patient has noticed that he gets more dyspneic on exertion.  Denies chest pain, palpitations, chronic cough.  He does admit that he has been less physically active due to caring for a baby girl.      Past Medical History:   Diagnosis Date     Attention deficit disorder with hyperactivity(314.01)      Depressive disorder      Epididymitis     bilateral     GERD (gastroesophageal reflux disease)      Hiatal hernia      Other acne      Other specified disorder of male genital organs(608.89) 1995    Small (R) testicle     Sleep disturbance, unspecified      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     Testicular pain, left     chronic, history of epididymitis     Varicocele     (L)     Voiding dysfunction 2/2011    mildly obstructive voiding pattern with a moderately enlarged prostate on BRITTANY     Zenker's diverticulum        Review of Systems   Constitutional: Negative for fatigue.   Respiratory: Positive for shortness of breath. Negative for cough.    Cardiovascular: Negative for chest pain, palpitations and leg swelling.   Gastrointestinal: Negative for abdominal pain, constipation, diarrhea, nausea and vomiting.   Musculoskeletal: Negative for myalgias.   Neurological: Negative for light-headedness and headaches.   Psychiatric/Behavioral: Positive for dysphoric mood (mild). Negative for hallucinations, sleep disturbance and  "suicidal ideas. The patient is not nervous/anxious.        /78 (BP Location: Right arm, Patient Position: Sitting, Cuff Size: Adult Large)   Pulse 83   Temp 98.4  F (36.9  C) (Oral)   Ht 1.778 m (5' 10\")   Wt 96.6 kg (213 lb)   SpO2 98%   BMI 30.56 kg/m        Physical Exam  Vitals signs reviewed.   Constitutional:       General: He is not in acute distress.  Neck:      Thyroid: No thyromegaly.   Cardiovascular:      Rate and Rhythm: Normal rate and regular rhythm.      Heart sounds: Normal heart sounds.   Pulmonary:      Effort: Pulmonary effort is normal. No respiratory distress.      Breath sounds: Normal breath sounds.   Abdominal:      Palpations: Abdomen is soft.      Tenderness: There is no abdominal tenderness.   Neurological:      Mental Status: He is alert and oriented to person, place, and time.      Coordination: Coordination normal.      Comments: No tremors   Psychiatric:         Mood and Affect: Mood normal.         Behavior: Behavior normal.           ICD-10-CM    1. Mild major depression (H) F32.0 sertraline (ZOLOFT) 50 MG tablet   2. Attention deficit hyperactivity disorder (ADHD), unspecified ADHD type F90.9 amphetamine-dextroamphetamine (ADDERALL) 15 MG tablet     lisdexamfetamine (VYVANSE) 50 MG capsule     lisdexamfetamine (VYVANSE) 50 MG capsule     lisdexamfetamine (VYVANSE) 50 MG capsule     amphetamine-dextroamphetamine (ADDERALL) 15 MG tablet     amphetamine-dextroamphetamine (ADDERALL) 15 MG tablet   3. HOMA (generalized anxiety disorder) F41.1 sertraline (ZOLOFT) 50 MG tablet   4. Exertional dyspnea R06.09 General PFT Lab (Please always keep checked)     Pulmonary Function Test       Patient Instructions   Proceed with your lung function test. [phone]    Call doctor if your attention deficit symptoms persist/worsens, or if you develop new symptoms or side effects from the higher dose of the Adderall.    Follow up in 6 months for your full physical exam (please come in " fasting).

## 2019-11-14 NOTE — TELEPHONE ENCOUNTER
Not sure how to change what patient is talking about or where it is located in the chart. Please fix if necessary.     Farideh Gamino MA

## 2019-11-14 NOTE — PATIENT INSTRUCTIONS
Proceed with your lung function test. [phone]    Call doctor if your attention deficit symptoms persist/worsens, or if you develop new symptoms or side effects from the higher dose of the Adderall.    Follow up in 6 months for your full physical exam (please come in fasting).

## 2019-11-15 ASSESSMENT — ENCOUNTER SYMPTOMS
CONSTIPATION: 0
ABDOMINAL PAIN: 0
HALLUCINATIONS: 0
LIGHT-HEADEDNESS: 0
DIARRHEA: 0
FATIGUE: 0
PALPITATIONS: 0
MYALGIAS: 0
HEADACHES: 0
NAUSEA: 0
VOMITING: 0
NERVOUS/ANXIOUS: 0

## 2019-11-22 ASSESSMENT — ENCOUNTER SYMPTOMS
COUGH: 0
SHORTNESS OF BREATH: 1
DYSPHORIC MOOD: 1
SLEEP DISTURBANCE: 0

## 2019-11-26 ENCOUNTER — MYC REFILL (OUTPATIENT)
Dept: FAMILY MEDICINE | Facility: CLINIC | Age: 34
End: 2019-11-26

## 2019-11-26 DIAGNOSIS — F90.9 ATTENTION DEFICIT HYPERACTIVITY DISORDER (ADHD), UNSPECIFIED ADHD TYPE: ICD-10-CM

## 2019-11-26 RX ORDER — LISDEXAMFETAMINE DIMESYLATE 50 MG/1
50 CAPSULE ORAL EVERY MORNING
Qty: 30 CAPSULE | Refills: 0 | Status: CANCELLED | OUTPATIENT
Start: 2019-11-26

## 2019-11-26 NOTE — TELEPHONE ENCOUNTER
Controlled Substance Refill Request for   lisdexamfetamine (VYVANSE) 50 MG capsule 30 capsule 0 11/14/2019     Problem List Complete:  Yes    Last Written Prescription Date:  11/14/2019  Last Fill Quantity: 30,   # refills: 0    THE MOST RECENT OFFICE VISIT MUST BE WITHIN THE PAST 3 MONTHS. AT LEAST ONE FACE TO FACE VISIT MUST OCCUR EVERY 6 MONTHS. ADDITIONAL VISITS CAN BE VIRTUAL.  (THIS STATEMENT SHOULD BE DELETED.)    Last Office Visit with [facility] primary care provider: 11/14/2019    Future Office visit: Unknown     Controlled substance agreement:   Encounter-Level CSA - 03/09/2017:    Controlled Substance Agreement - Scan on 3/19/2017  5:00 PM: CONTROLLED SUBSTANCE AGREEMENT     Patient-Level CSA:    There are no patient-level csa.         Last Urine Drug Screen: No results found for: CDAUT, No results found for: COMDAT, No results found for: THC13, PCP13, COC13, MAMP13, OPI13, AMP13, BZO13, TCA13, MTD13, BAR13, OXY13, PPX13, BUP13     Processing:  Rx to be electronically transmitted to pharmacy by provider     [email or web address]   checked in past 3 months?  Yes - 9/27/2019

## 2019-11-27 NOTE — TELEPHONE ENCOUNTER
[place]  Last refilled:  10/31/19 #30.    To PCP for review and sign    Thank you,  Leigh Rm RN on 11/27/2019 at 2:16 PM

## 2020-02-25 ENCOUNTER — MYC REFILL (OUTPATIENT)
Dept: FAMILY MEDICINE | Facility: CLINIC | Age: 35
End: 2020-02-25

## 2020-02-25 DIAGNOSIS — F90.9 ATTENTION DEFICIT HYPERACTIVITY DISORDER (ADHD), UNSPECIFIED ADHD TYPE: ICD-10-CM

## 2020-02-25 RX ORDER — LISDEXAMFETAMINE DIMESYLATE 50 MG/1
50 CAPSULE ORAL EVERY MORNING
Qty: 30 CAPSULE | Refills: 0 | Status: SHIPPED | OUTPATIENT
Start: 2020-02-25 | End: 2020-05-27

## 2020-02-25 RX ORDER — LISDEXAMFETAMINE DIMESYLATE 50 MG/1
50 CAPSULE ORAL EVERY MORNING
Qty: 30 CAPSULE | Refills: 0 | Status: SHIPPED | OUTPATIENT
Start: 2020-04-23 | End: 2020-05-29

## 2020-02-25 RX ORDER — LISDEXAMFETAMINE DIMESYLATE 50 MG/1
50 CAPSULE ORAL EVERY MORNING
Qty: 30 CAPSULE | Refills: 0 | Status: SHIPPED | OUTPATIENT
Start: 2020-03-25 | End: 2020-05-29

## 2020-02-25 RX ORDER — DEXTROAMPHETAMINE SACCHARATE, AMPHETAMINE ASPARTATE, DEXTROAMPHETAMINE SULFATE AND AMPHETAMINE SULFATE 3.75; 3.75; 3.75; 3.75 MG/1; MG/1; MG/1; MG/1
15 TABLET ORAL 2 TIMES DAILY
Qty: 60 TABLET | Refills: 0 | Status: SHIPPED | OUTPATIENT
Start: 2020-04-23 | End: 2020-05-29

## 2020-02-25 RX ORDER — DEXTROAMPHETAMINE SACCHARATE, AMPHETAMINE ASPARTATE, DEXTROAMPHETAMINE SULFATE AND AMPHETAMINE SULFATE 3.75; 3.75; 3.75; 3.75 MG/1; MG/1; MG/1; MG/1
15 TABLET ORAL 2 TIMES DAILY
Qty: 60 TABLET | Refills: 0 | Status: SHIPPED | OUTPATIENT
Start: 2020-03-25 | End: 2020-05-29

## 2020-02-25 RX ORDER — LISDEXAMFETAMINE DIMESYLATE 50 MG/1
50 CAPSULE ORAL EVERY MORNING
Qty: 30 CAPSULE | Refills: 0 | Status: CANCELLED | OUTPATIENT
Start: 2020-02-25

## 2020-02-25 RX ORDER — DEXTROAMPHETAMINE SACCHARATE, AMPHETAMINE ASPARTATE, DEXTROAMPHETAMINE SULFATE AND AMPHETAMINE SULFATE 3.75; 3.75; 3.75; 3.75 MG/1; MG/1; MG/1; MG/1
15 TABLET ORAL 2 TIMES DAILY
Qty: 60 TABLET | Refills: 0 | Status: CANCELLED | OUTPATIENT
Start: 2020-02-25

## 2020-02-25 RX ORDER — DEXTROAMPHETAMINE SACCHARATE, AMPHETAMINE ASPARTATE, DEXTROAMPHETAMINE SULFATE AND AMPHETAMINE SULFATE 3.75; 3.75; 3.75; 3.75 MG/1; MG/1; MG/1; MG/1
15 TABLET ORAL 2 TIMES DAILY
Qty: 60 TABLET | Refills: 0 | Status: SHIPPED | OUTPATIENT
Start: 2020-02-25 | End: 2020-05-27

## 2020-02-25 NOTE — TELEPHONE ENCOUNTER
Controlled Substance Refill Request for     lisdexamfetamine (VYVANSE) 50 MG capsule 30 capsule 0 1/14/2020  No   Sig - Route: Take 1 capsule (50 mg) by mouth every morning      Last Written Prescription Date:  01/14/2020  Last Fill Quantity: 30,  # refills: 0   Last office visit: 11/14/2019 with prescribing provider:     Future Office Visit:      amphetamine-dextroamphetamine (ADDERALL) 15 MG tablet 60 tablet 0 11/14/2019  No   Sig - Route: Take 1 tablet (15 mg) by mouth 2 times daily      Last Written Prescription Date:  11/14/2019 (3 scripts written that date but I do not see a note for fill dates)  Last Fill Quantity: 60,  # refills: 0   Last office visit: 11/14/2019 with prescribing provider:     Future Office Visit:      Problem List Complete:  Yes  Attention deficit hyperactivity disorder (ADHD), unspecified ADHD type   Problem Detail     Noted:  7/26/2018   Priority:  Medium   Overview Addendum 11/27/2019  2:15 PM by Leigh Rm RN   Patient is followed by SELVIN BULLOCK for ongoing prescription of stimulants.  All refills should be approved by this provider, or covering partner.     Medication(s): lisdexamfetamine (VYVANSE) 50 MG capsule.   Maximum quantity per month: 30  Clinic visit frequency required: Q 6  months      Controlled substance agreement on file: Yes       Date(s): 3/9/17  Neuropsych evaluation for ADD completed:      Last [facility] website verification:  11/27/19 [place]   [email or web address]        checked in past 3 months?  No, route to RN

## 2020-02-26 NOTE — TELEPHONE ENCOUNTER
Last [facility] website verification: 2/25/2020 [place]    [email or web address]    Routing refill request to provider for review/approval because:  Drugs not on the [facility] refill protocol     Junie MARSHALL RN

## 2020-05-21 DIAGNOSIS — F32.0 MILD MAJOR DEPRESSION (H): ICD-10-CM

## 2020-05-21 DIAGNOSIS — F41.1 GAD (GENERALIZED ANXIETY DISORDER): ICD-10-CM

## 2020-05-21 DIAGNOSIS — F90.9 ATTENTION DEFICIT HYPERACTIVITY DISORDER (ADHD), UNSPECIFIED ADHD TYPE: ICD-10-CM

## 2020-05-27 RX ORDER — DEXTROAMPHETAMINE SACCHARATE, AMPHETAMINE ASPARTATE, DEXTROAMPHETAMINE SULFATE AND AMPHETAMINE SULFATE 3.75; 3.75; 3.75; 3.75 MG/1; MG/1; MG/1; MG/1
15 TABLET ORAL 2 TIMES DAILY
Qty: 60 TABLET | Refills: 0 | Status: CANCELLED | OUTPATIENT
Start: 2020-05-27

## 2020-05-27 RX ORDER — DEXTROAMPHETAMINE SACCHARATE, AMPHETAMINE ASPARTATE, DEXTROAMPHETAMINE SULFATE AND AMPHETAMINE SULFATE 3.75; 3.75; 3.75; 3.75 MG/1; MG/1; MG/1; MG/1
15 TABLET ORAL 2 TIMES DAILY
Qty: 60 TABLET | Refills: 0 | Status: SHIPPED | OUTPATIENT
Start: 2020-05-27 | End: 2020-05-29

## 2020-05-27 RX ORDER — LISDEXAMFETAMINE DIMESYLATE 50 MG/1
50 CAPSULE ORAL EVERY MORNING
Qty: 30 CAPSULE | Refills: 0 | Status: CANCELLED | OUTPATIENT
Start: 2020-05-27

## 2020-05-27 RX ORDER — LISDEXAMFETAMINE DIMESYLATE 50 MG/1
50 CAPSULE ORAL EVERY MORNING
Qty: 30 CAPSULE | Refills: 0 | Status: SHIPPED | OUTPATIENT
Start: 2020-05-27 | End: 2020-05-29

## 2020-05-27 NOTE — TELEPHONE ENCOUNTER
Patient asked for a 3 month supply of   lisdexamfetamine (VYVANSE) 50 MG capsule   amphetamine-dextroamphetamine (ADDERALL) 15 MG tablet   sertraline (ZOLOFT) 50 MG tablet

## 2020-05-27 NOTE — TELEPHONE ENCOUNTER
PCP,    Pt has video visit scheduled 5/29    He is requesting 3 month supply of both vyvanse and adderall    Please review and authorize if appropriate.    Thank you,   Timmy FITZPATRICK RN

## 2020-05-29 ENCOUNTER — VIRTUAL VISIT (OUTPATIENT)
Dept: FAMILY MEDICINE | Facility: CLINIC | Age: 35
End: 2020-05-29
Payer: COMMERCIAL

## 2020-05-29 DIAGNOSIS — F90.9 ATTENTION DEFICIT HYPERACTIVITY DISORDER (ADHD), UNSPECIFIED ADHD TYPE: ICD-10-CM

## 2020-05-29 DIAGNOSIS — F32.0 MILD MAJOR DEPRESSION (H): Primary | ICD-10-CM

## 2020-05-29 DIAGNOSIS — F41.1 GAD (GENERALIZED ANXIETY DISORDER): ICD-10-CM

## 2020-05-29 PROCEDURE — 99214 OFFICE O/P EST MOD 30 MIN: CPT | Mod: 95 | Performed by: INTERNAL MEDICINE

## 2020-05-29 RX ORDER — DULOXETIN HYDROCHLORIDE 20 MG/1
20 CAPSULE, DELAYED RELEASE ORAL 2 TIMES DAILY
Qty: 30 CAPSULE | Refills: 1 | Status: SHIPPED | OUTPATIENT
Start: 2020-05-29 | End: 2020-06-03

## 2020-05-29 RX ORDER — DEXTROAMPHETAMINE SACCHARATE, AMPHETAMINE ASPARTATE, DEXTROAMPHETAMINE SULFATE AND AMPHETAMINE SULFATE 3.75; 3.75; 3.75; 3.75 MG/1; MG/1; MG/1; MG/1
15 TABLET ORAL 2 TIMES DAILY
Qty: 60 TABLET | Refills: 0 | Status: SHIPPED | OUTPATIENT
Start: 2020-06-27 | End: 2020-07-27

## 2020-05-29 RX ORDER — LISDEXAMFETAMINE DIMESYLATE 50 MG/1
50 CAPSULE ORAL EVERY MORNING
Qty: 30 CAPSULE | Refills: 0 | Status: SHIPPED | OUTPATIENT
Start: 2020-07-27 | End: 2020-08-25

## 2020-05-29 RX ORDER — LISDEXAMFETAMINE DIMESYLATE 50 MG/1
50 CAPSULE ORAL EVERY MORNING
Qty: 30 CAPSULE | Refills: 0 | Status: SHIPPED | OUTPATIENT
Start: 2020-06-27 | End: 2020-07-27

## 2020-05-29 RX ORDER — DEXTROAMPHETAMINE SACCHARATE, AMPHETAMINE ASPARTATE, DEXTROAMPHETAMINE SULFATE AND AMPHETAMINE SULFATE 3.75; 3.75; 3.75; 3.75 MG/1; MG/1; MG/1; MG/1
15 TABLET ORAL 2 TIMES DAILY
Qty: 60 TABLET | Refills: 0 | Status: SHIPPED | OUTPATIENT
Start: 2020-07-27 | End: 2020-08-26

## 2020-05-29 ASSESSMENT — ENCOUNTER SYMPTOMS
PALPITATIONS: 0
LIGHT-HEADEDNESS: 0
HEADACHES: 0
FATIGUE: 0
DYSPHORIC MOOD: 0
NERVOUS/ANXIOUS: 0
CONSTIPATION: 0
SHORTNESS OF BREATH: 0
NAUSEA: 0
HALLUCINATIONS: 0
ABDOMINAL PAIN: 0
VOMITING: 0
DIZZINESS: 0
DIARRHEA: 0
MYALGIAS: 0

## 2020-05-29 NOTE — PROGRESS NOTES
"Manjula Dorman is a 35 year old male who is being evaluated via a billable telephone visit.      The patient has been notified of following:     \"This telephone visit will be conducted via a call between you and your physician/provider. We have found that certain health care needs can be provided without the need for an in-person physical exam.  This service lets us provide the care you need with a telephone conversation.  If a prescription is necessary we can send it directly to your pharmacy.  If lab work is needed we can place an order for that and you can then stop by our lab to have the test done at a later time.    Telephone visits are billed at different rates depending on your insurance coverage.  Please reach out to your insurance provider with any questions.    If during the course of the call the physician/provider feels a telephone visit is not appropriate, you will not be charged for this service.\"    Patient has given verbal consent for telephone visit? Yes      Subjective     Manjula Dorman is a 35 year old male who presents today via video visit for the following health issues:    HPI  Medication Followup     Taking Medication as prescribed: yes    Side Effects:  None         Patient continues to do well on Vyvanse 50 mg in the morning and as needed Adderall 15 mg in the afternoon.    Depression and anxiety are doing fairly well with Zoloft.  Patient is following up with a counselor.  Patient has noted \"fogginess\" since he started the Zoloft.  Willing to try a different medication.      Review of Systems   Constitutional: Negative for fatigue.   Eyes: Negative for visual disturbance.   Respiratory: Negative for shortness of breath.    Cardiovascular: Negative for chest pain and palpitations.   Gastrointestinal: Negative for abdominal pain, constipation, diarrhea, nausea and vomiting.   Musculoskeletal: Negative for myalgias.   Neurological: Negative for dizziness, light-headedness and headaches. "   Psychiatric/Behavioral: Negative for dysphoric mood, hallucinations, self-injury and suicidal ideas. The patient is not nervous/anxious.          ICD-10-CM    1. Mild major depression (H)  F32.0 DULoxetine (CYMBALTA) 20 MG capsule   2. HOMA (generalized anxiety disorder)  F41.1 DULoxetine (CYMBALTA) 20 MG capsule   3. Attention deficit hyperactivity disorder (ADHD), unspecified ADHD type  F90.9 amphetamine-dextroamphetamine (ADDERALL) 15 MG tablet     lisdexamfetamine (VYVANSE) 50 MG capsule     lisdexamfetamine (VYVANSE) 50 MG capsule     amphetamine-dextroamphetamine (ADDERALL) 15 MG tablet       Total time spent with the patient over the phone was 9 minutes.      Dr. Ray Johnson

## 2020-06-03 ENCOUNTER — MYC MEDICAL ADVICE (OUTPATIENT)
Dept: FAMILY MEDICINE | Facility: CLINIC | Age: 35
End: 2020-06-03

## 2020-06-03 DIAGNOSIS — F32.0 MILD MAJOR DEPRESSION (H): ICD-10-CM

## 2020-06-03 DIAGNOSIS — F41.1 GAD (GENERALIZED ANXIETY DISORDER): ICD-10-CM

## 2020-06-03 RX ORDER — DULOXETIN HYDROCHLORIDE 20 MG/1
20 CAPSULE, DELAYED RELEASE ORAL DAILY
Qty: 30 CAPSULE | Refills: 1 | Status: SHIPPED | OUTPATIENT
Start: 2020-06-29 | End: 2020-08-19

## 2020-06-03 NOTE — TELEPHONE ENCOUNTER
To PCP:     Can you please confirm dosing of duloxetine, Pt believes he is to take 1 tablet (20mg) daily. RX states 1 tablet BID    Also inquires: Also, do you have any concerns with me taking cymbalta and potential side effects or weaning off the medication later on? Thank you!    Please advise,     Thank you,   Daria JAIMES RN

## 2020-07-07 DIAGNOSIS — L30.9 DERMATITIS: ICD-10-CM

## 2020-07-08 NOTE — TELEPHONE ENCOUNTER
Refill request:    CLOBETASOL 0.05% Emollient cream 30 GM  Historical in chart. Last dispense 9/27/2019

## 2020-07-17 NOTE — TELEPHONE ENCOUNTER
Last Rx 9/27/19 #30g with 0 refills - Clobetasol cream 0.05% - PRN for dermatitis. Stopped from med list 5/2020 by MA     Pharmacy has sent multiple requests for this, please advise - Rx pended     Junie MARSHALL RN     Speaking Coherently

## 2020-07-20 RX ORDER — CLOBETASOL PROPIONATE 0.5 MG/G
CREAM TOPICAL 2 TIMES DAILY PRN
Qty: 30 G | Refills: 0 | Status: SHIPPED | OUTPATIENT
Start: 2020-07-20 | End: 2020-10-27

## 2020-08-17 DIAGNOSIS — F32.0 MILD MAJOR DEPRESSION (H): ICD-10-CM

## 2020-08-17 DIAGNOSIS — F41.1 GAD (GENERALIZED ANXIETY DISORDER): ICD-10-CM

## 2020-08-19 RX ORDER — DULOXETIN HYDROCHLORIDE 20 MG/1
20 CAPSULE, DELAYED RELEASE ORAL DAILY
Qty: 30 CAPSULE | Refills: 1 | Status: SHIPPED | OUTPATIENT
Start: 2020-08-19 | End: 2020-10-20

## 2020-08-19 NOTE — TELEPHONE ENCOUNTER
"Last Written Prescription Date:  6/29/2020  Last Fill Quantity: 30,  # refills: 1   Virtual visit 5/29/2020 with prescribing provider:  Yes, PCP   Future Office Visit:      Prescription approved per [facility] Refill Protocol.  Shilpa Hawk RN    Requested Prescriptions   Signed Prescriptions Disp Refills    DULoxetine (CYMBALTA) 20 MG capsule 30 capsule 1     Sig: Take 1 capsule (20 mg) by mouth daily       Serotonin-Norepinephrine Reuptake Inhibitors  Failed - 8/17/2020  6:36 PM        Failed - PHQ-9 score of less than 5 in past 6 months     Please review last PHQ-9 score.           Passed - Blood pressure under 140/90 in past 12 months     BP Readings from Last 3 Encounters:   11/14/19 125/78   05/19/19 140/83   03/22/19 136/82                 Passed - Medication is active on med list        Passed - Patient is age 18 or older        Passed - Recent (6 mo) or future (30 days) visit within the authorizing provider's specialty     Patient had office visit in the last 6 months or has a visit in the next 30 days with authorizing provider or within the authorizing provider's specialty.  See \"Patient Info\" tab in inbasket, or \"Choose Columns\" in Meds & Orders section of the refill encounter.                     "

## 2020-08-25 ENCOUNTER — MYC REFILL (OUTPATIENT)
Dept: FAMILY MEDICINE | Facility: CLINIC | Age: 35
End: 2020-08-25

## 2020-08-25 DIAGNOSIS — F90.9 ATTENTION DEFICIT HYPERACTIVITY DISORDER (ADHD), UNSPECIFIED ADHD TYPE: ICD-10-CM

## 2020-08-27 RX ORDER — LISDEXAMFETAMINE DIMESYLATE 50 MG/1
50 CAPSULE ORAL EVERY MORNING
Qty: 30 CAPSULE | Refills: 0 | Status: SHIPPED | OUTPATIENT
Start: 2020-09-27 | End: 2021-03-03

## 2020-08-27 RX ORDER — LISDEXAMFETAMINE DIMESYLATE 50 MG/1
50 CAPSULE ORAL EVERY MORNING
Qty: 30 CAPSULE | Refills: 0 | Status: SHIPPED | OUTPATIENT
Start: 2020-10-27 | End: 2020-10-29

## 2020-08-27 RX ORDER — LISDEXAMFETAMINE DIMESYLATE 50 MG/1
50 CAPSULE ORAL EVERY MORNING
Qty: 30 CAPSULE | Refills: 0 | Status: SHIPPED | OUTPATIENT
Start: 2020-08-27 | End: 2021-01-25

## 2020-09-18 ENCOUNTER — MYC MEDICAL ADVICE (OUTPATIENT)
Dept: FAMILY MEDICINE | Facility: CLINIC | Age: 35
End: 2020-09-18

## 2020-09-18 DIAGNOSIS — F90.9 ATTENTION DEFICIT HYPERACTIVITY DISORDER (ADHD), UNSPECIFIED ADHD TYPE: ICD-10-CM

## 2020-09-21 RX ORDER — DEXTROAMPHETAMINE SACCHARATE, AMPHETAMINE ASPARTATE, DEXTROAMPHETAMINE SULFATE AND AMPHETAMINE SULFATE 3.75; 3.75; 3.75; 3.75 MG/1; MG/1; MG/1; MG/1
15 TABLET ORAL 2 TIMES DAILY
Qty: 60 TABLET | Refills: 0 | OUTPATIENT
Start: 2020-10-20

## 2020-09-21 RX ORDER — DEXTROAMPHETAMINE SACCHARATE, AMPHETAMINE ASPARTATE, DEXTROAMPHETAMINE SULFATE AND AMPHETAMINE SULFATE 3.75; 3.75; 3.75; 3.75 MG/1; MG/1; MG/1; MG/1
15 TABLET ORAL 2 TIMES DAILY
Qty: 60 TABLET | Refills: 0 | OUTPATIENT
Start: 2020-09-21

## 2020-09-21 NOTE — TELEPHONE ENCOUNTER
"Adderall IR 15mg   (\"\" and fell of med list)    Last Written Prescription Date:  2 Rxs signed on 2020   Last Fill Quantity: 60,   # refills: 0  Last Visit: 2020 - follow up 6 months  Future Office visit:       Routing refill request to provider for review/approval because:  Drug not on the [facility], P or  Health refill protocol or controlled substance    * not working currently    [facility] sent to pt to follow up with new provider in future - reminding due for follow up end-2020     Junie MARSHALL RN    "

## 2020-09-21 NOTE — TELEPHONE ENCOUNTER
Please clarify with patient,   is he taking adderrall or vyvanse? Last several months since May 2020  shows Vyvanse  He will also need follow up apt

## 2020-09-22 NOTE — TELEPHONE ENCOUNTER
Dr. Tinsley/Courtney - are either of you willing to accept patient (formerly Dr Johnson patient)?     He is asking if he can establish care with one of you?     Please advise     Junie MARSHALL RN

## 2020-09-22 NOTE — TELEPHONE ENCOUNTER
Unable to open  again, site is currently down     Dispense report shows Adderall 15mg filled 5/27, 4/27, 3/26, 2/25 - 30 day     Dispense report shows Vyvanse filled #30 7/28, 6/27, 5/27, 4/27, 3/26, 2/25 - 30 day     [facility] message sent to patient to notify of Dr Lu's message below    Junie MARSHALL RN

## 2020-10-19 DIAGNOSIS — F41.1 GAD (GENERALIZED ANXIETY DISORDER): ICD-10-CM

## 2020-10-19 DIAGNOSIS — F32.0 MILD MAJOR DEPRESSION (H): ICD-10-CM

## 2020-10-20 RX ORDER — DULOXETIN HYDROCHLORIDE 20 MG/1
20 CAPSULE, DELAYED RELEASE ORAL DAILY
Qty: 90 CAPSULE | Refills: 0 | Status: SHIPPED | OUTPATIENT
Start: 2020-10-20 | End: 2020-10-27 | Stop reason: DRUGHIGH

## 2020-10-20 NOTE — TELEPHONE ENCOUNTER
Refill request:    DULOXETINE DR 20 MG CAPSULES    Summary: Take 1 capsule (20 mg) by mouth daily, Disp-30 capsule,R-1, E-Prescribe   Dose, Route, Frequency: 20 mg, Oral, DAILY  Start: 8/19/2020  Ord/Sold: 8/19/2020

## 2020-10-20 NOTE — TELEPHONE ENCOUNTER
Routing refill request to provider for review/approval because:  Protocol failed.  PCP no longer [facility] provider.  Patient does have appointment scheduled for 10/27/2020.    BOGDAN Constantino, RN  [employer] [facility]

## 2020-10-26 DIAGNOSIS — F90.9 ATTENTION DEFICIT HYPERACTIVITY DISORDER (ADHD), UNSPECIFIED ADHD TYPE: ICD-10-CM

## 2020-10-26 NOTE — TELEPHONE ENCOUNTER
Need new RX's from different provider     Former Dr. Johnson patient, VIPUL # is inactive    Pharmacy requesting Adderall 15MG (not active in chart) and Vyvanse 50MG       Disp Refills Start End LYLE   lisdexamfetamine (VYVANSE) 50 MG capsule 30 capsule 0 10/27/2020  No   Sig - Route: Take 1 capsule (50 mg) by mouth every morning - Oral      Disp Refills Start End LYLE   amphetamine-dextroamphetamine (ADDERALL) 15 MG tablet 60 tablet 0 7/27/2020 8/26/2020 No   Sig - Route: Take 1 tablet (15 mg) by mouth 2 times daily - Oral

## 2020-10-27 ENCOUNTER — OFFICE VISIT (OUTPATIENT)
Dept: FAMILY MEDICINE | Facility: CLINIC | Age: 35
End: 2020-10-27
Payer: COMMERCIAL

## 2020-10-27 VITALS
SYSTOLIC BLOOD PRESSURE: 122 MMHG | OXYGEN SATURATION: 99 % | BODY MASS INDEX: 31.64 KG/M2 | WEIGHT: 221 LBS | HEIGHT: 70 IN | DIASTOLIC BLOOD PRESSURE: 82 MMHG | TEMPERATURE: 98.1 F | HEART RATE: 104 BPM

## 2020-10-27 DIAGNOSIS — Z23 NEED FOR PROPHYLACTIC VACCINATION AND INOCULATION AGAINST INFLUENZA: ICD-10-CM

## 2020-10-27 DIAGNOSIS — F90.9 ATTENTION DEFICIT HYPERACTIVITY DISORDER (ADHD), UNSPECIFIED ADHD TYPE: ICD-10-CM

## 2020-10-27 DIAGNOSIS — Z00.00 ROUTINE GENERAL MEDICAL EXAMINATION AT A HEALTH CARE FACILITY: Primary | ICD-10-CM

## 2020-10-27 DIAGNOSIS — F32.0 MILD MAJOR DEPRESSION (H): ICD-10-CM

## 2020-10-27 LAB
ERYTHROCYTE [DISTWIDTH] IN BLOOD BY AUTOMATED COUNT: 12.8 % (ref 10–15)
HCT VFR BLD AUTO: 42.5 % (ref 40–53)
HGB BLD-MCNC: 14.8 G/DL (ref 13.3–17.7)
MCH RBC QN AUTO: 28.6 PG (ref 26.5–33)
MCHC RBC AUTO-ENTMCNC: 34.8 G/DL (ref 31.5–36.5)
MCV RBC AUTO: 82 FL (ref 78–100)
PLATELET # BLD AUTO: 286 10E9/L (ref 150–450)
RBC # BLD AUTO: 5.17 10E12/L (ref 4.4–5.9)
WBC # BLD AUTO: 6.6 10E9/L (ref 4–11)

## 2020-10-27 PROCEDURE — 80061 LIPID PANEL: CPT | Performed by: PHYSICIAN ASSISTANT

## 2020-10-27 PROCEDURE — 99395 PREV VISIT EST AGE 18-39: CPT | Mod: 25 | Performed by: PHYSICIAN ASSISTANT

## 2020-10-27 PROCEDURE — 36415 COLL VENOUS BLD VENIPUNCTURE: CPT | Performed by: PHYSICIAN ASSISTANT

## 2020-10-27 PROCEDURE — 99213 OFFICE O/P EST LOW 20 MIN: CPT | Mod: 25 | Performed by: PHYSICIAN ASSISTANT

## 2020-10-27 PROCEDURE — 90471 IMMUNIZATION ADMIN: CPT | Performed by: PHYSICIAN ASSISTANT

## 2020-10-27 PROCEDURE — 96127 BRIEF EMOTIONAL/BEHAV ASSMT: CPT | Performed by: PHYSICIAN ASSISTANT

## 2020-10-27 PROCEDURE — 84443 ASSAY THYROID STIM HORMONE: CPT | Performed by: PHYSICIAN ASSISTANT

## 2020-10-27 PROCEDURE — 90686 IIV4 VACC NO PRSV 0.5 ML IM: CPT | Performed by: PHYSICIAN ASSISTANT

## 2020-10-27 PROCEDURE — 85027 COMPLETE CBC AUTOMATED: CPT | Performed by: PHYSICIAN ASSISTANT

## 2020-10-27 PROCEDURE — 80053 COMPREHEN METABOLIC PANEL: CPT | Performed by: PHYSICIAN ASSISTANT

## 2020-10-27 RX ORDER — DULOXETIN HYDROCHLORIDE 30 MG/1
30 CAPSULE, DELAYED RELEASE ORAL 2 TIMES DAILY
Qty: 60 CAPSULE | Refills: 3 | Status: SHIPPED | OUTPATIENT
Start: 2020-10-27 | End: 2020-11-23

## 2020-10-27 RX ORDER — DEXTROAMPHETAMINE SACCHARATE, AMPHETAMINE ASPARTATE, DEXTROAMPHETAMINE SULFATE AND AMPHETAMINE SULFATE 3.75; 3.75; 3.75; 3.75 MG/1; MG/1; MG/1; MG/1
TABLET ORAL
COMMUNITY
Start: 2020-09-21 | End: 2020-10-27

## 2020-10-27 ASSESSMENT — MIFFLIN-ST. JEOR: SCORE: 1943.7

## 2020-10-27 ASSESSMENT — PATIENT HEALTH QUESTIONNAIRE - PHQ9
10. IF YOU CHECKED OFF ANY PROBLEMS, HOW DIFFICULT HAVE THESE PROBLEMS MADE IT FOR YOU TO DO YOUR WORK, TAKE CARE OF THINGS AT HOME, OR GET ALONG WITH OTHER PEOPLE: SOMEWHAT DIFFICULT
SUM OF ALL RESPONSES TO PHQ QUESTIONS 1-9: 14
SUM OF ALL RESPONSES TO PHQ QUESTIONS 1-9: 14
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
## 2020-10-28 LAB
ALBUMIN SERPL-MCNC: 4 G/DL (ref 3.4–5)
ALP SERPL-CCNC: 71 U/L (ref 40–150)
ALT SERPL W P-5'-P-CCNC: 44 U/L (ref 0–70)
ANION GAP SERPL CALCULATED.3IONS-SCNC: 4 MMOL/L (ref 3–14)
AST SERPL W P-5'-P-CCNC: 23 U/L (ref 0–45)
BILIRUB SERPL-MCNC: 0.3 MG/DL (ref 0.2–1.3)
BUN SERPL-MCNC: 18 MG/DL (ref 7–30)
CALCIUM SERPL-MCNC: 9.1 MG/DL (ref 8.5–10.1)
CHLORIDE SERPL-SCNC: 103 MMOL/L (ref 94–109)
CHOLEST SERPL-MCNC: 239 MG/DL
CO2 SERPL-SCNC: 28 MMOL/L (ref 20–32)
CREAT SERPL-MCNC: 1.29 MG/DL (ref 0.66–1.25)
GFR SERPL CREATININE-BSD FRML MDRD: 71 ML/MIN/{1.73_M2}
GLUCOSE SERPL-MCNC: 91 MG/DL (ref 70–99)
HDLC SERPL-MCNC: 38 MG/DL
LDLC SERPL CALC-MCNC: 152 MG/DL
NONHDLC SERPL-MCNC: 201 MG/DL
POTASSIUM SERPL-SCNC: 3.9 MMOL/L (ref 3.4–5.3)
PROT SERPL-MCNC: 8 G/DL (ref 6.8–8.8)
SODIUM SERPL-SCNC: 135 MMOL/L (ref 133–144)
TRIGL SERPL-MCNC: 245 MG/DL
TSH SERPL DL<=0.005 MIU/L-ACNC: 1.53 MU/L (ref 0.4–4)

## 2020-10-28 NOTE — TELEPHONE ENCOUNTER
Routing refill request to provider for review/approval because:  Pharmacy cannot fill last RX on file (dated 10/27) with Dr. Johnson's VIPUL     Courtney, are you willing to sign since Pt had OV yesterday?       Please review and authorize if appropriate,     Thank you,   Daria LA RN

## 2020-10-28 NOTE — TELEPHONE ENCOUNTER
Second request from pharmacy    See original note below - pharmacy was also asking for Adderall 15mg, which was not active in chart and also Vyvanse 50mg which is active.    Courtney - are you new PCP?      RT Eugene (R)

## 2020-10-29 RX ORDER — LISDEXAMFETAMINE DIMESYLATE 50 MG/1
50 CAPSULE ORAL EVERY MORNING
Qty: 30 CAPSULE | Refills: 0 | Status: SHIPPED | OUTPATIENT
Start: 2020-10-29 | End: 2020-11-23

## 2020-10-30 NOTE — RESULT ENCOUNTER NOTE
It was a pleasure seeing you for your physical examination.  I wanted to get back to you with your test results.     I am happy to report that your CBC or complete blood count is normal with no signs of anemia, leukemia or platelet abnormalities. Your chemistry panel shows no signs of diabetes.  Your blood salts, kidney tests, liver tests, and proteins are all fine.    Your total cholesterol is 239 with the normal range being below 200.  Your HDL or good cholesterol is 38 with the normal range being above 40.  Your LDL or bad cholesterol is 152 with the normal range being below 160.  This is higher this year.  Please up the exercise and follow a low fat, high fiber diet and we can recheck this again next year.    Courtney Ronquillo PA-C

## 2020-11-23 ENCOUNTER — MYC REFILL (OUTPATIENT)
Dept: FAMILY MEDICINE | Facility: CLINIC | Age: 35
End: 2020-11-23

## 2020-11-23 DIAGNOSIS — F90.9 ATTENTION DEFICIT HYPERACTIVITY DISORDER (ADHD), UNSPECIFIED ADHD TYPE: ICD-10-CM

## 2020-11-23 DIAGNOSIS — F32.0 MILD MAJOR DEPRESSION (H): ICD-10-CM

## 2020-11-24 RX ORDER — DULOXETIN HYDROCHLORIDE 30 MG/1
30 CAPSULE, DELAYED RELEASE ORAL 2 TIMES DAILY
Qty: 60 CAPSULE | Refills: 3 | Status: SHIPPED | OUTPATIENT
Start: 2020-11-24 | End: 2021-03-26

## 2020-11-24 RX ORDER — LISDEXAMFETAMINE DIMESYLATE 50 MG/1
50 CAPSULE ORAL EVERY MORNING
Qty: 30 CAPSULE | Refills: 0 | Status: SHIPPED | OUTPATIENT
Start: 2020-11-24 | End: 2020-12-28

## 2020-12-28 ENCOUNTER — MYC REFILL (OUTPATIENT)
Dept: FAMILY MEDICINE | Facility: CLINIC | Age: 35
End: 2020-12-28

## 2020-12-28 DIAGNOSIS — F90.9 ATTENTION DEFICIT HYPERACTIVITY DISORDER (ADHD), UNSPECIFIED ADHD TYPE: ICD-10-CM

## 2020-12-28 RX ORDER — LISDEXAMFETAMINE DIMESYLATE 50 MG/1
50 CAPSULE ORAL EVERY MORNING
Qty: 30 CAPSULE | Refills: 0 | Status: SHIPPED | OUTPATIENT
Start: 2020-12-28 | End: 2021-01-24

## 2020-12-28 NOTE — TELEPHONE ENCOUNTER
Routing refill request to provider for review/approval because:  Drug not on the [facility] refill protocol     Last filled by Courtney Ronquillo on 11/24/20    TCs: Please call pt to help schedule appt and help establish with new PCP.      Patient is followed by SELVIN BULLOCK for ongoing prescription of stimulants.  All refills should be approved by this provider, or covering partner.    Medication(s): lisdexamfetamine (VYVANSE) 50 MG capsule.   Maximum quantity per month: 30  Clinic visit frequency required: Q 6  months     Controlled substance agreement on file: Yes       Date(s): 3/9/17  Neuropsych evaluation for ADD completed:     Last [facility] website verification: 12/28/20 GM      Please review and authorize if appropriate.    Thank you,   Timmy FITZPATRICK RN

## 2021-01-24 ENCOUNTER — MYC REFILL (OUTPATIENT)
Dept: FAMILY MEDICINE | Facility: CLINIC | Age: 36
End: 2021-01-24

## 2021-01-24 DIAGNOSIS — F90.9 ATTENTION DEFICIT HYPERACTIVITY DISORDER (ADHD), UNSPECIFIED ADHD TYPE: ICD-10-CM

## 2021-01-25 RX ORDER — LISDEXAMFETAMINE DIMESYLATE 50 MG/1
50 CAPSULE ORAL EVERY MORNING
Qty: 30 CAPSULE | Refills: 0 | Status: SHIPPED | OUTPATIENT
Start: 2021-01-25 | End: 2021-03-02

## 2021-01-25 NOTE — TELEPHONE ENCOUNTER
lisdexamfetamine (VYVANSE) 50 MG capsule 30 capsule 0 12/28/2020  No   Sig - Route: Take 1 capsule (50 mg) by mouth every morning - Oral   Sent to pharmacy as: Lisdexamfetamine Dimesylate 50 MG Oral Capsule (Vyvanse)   Class: E-Prescribe   Earliest Fill Date: 12/28/2020   Order: [id number]   E-Prescribing Status: Receipt confirmed by pharmacy (12/28/2020  2:24 PM CST)     Last OV 10/27/2020       Last [facility] website verification: 12/28/20 GM    Routing refill request to provider for review/approval because:  Drug not on the [facility] refill protocol     Junie MARSHALL RN

## 2021-02-22 ENCOUNTER — HOSPITAL ENCOUNTER (EMERGENCY)
Facility: CLINIC | Age: 36
Discharge: HOME | End: 2021-02-22
Attending: EMERGENCY MEDICINE | Admitting: EMERGENCY MEDICINE
Payer: COMMERCIAL

## 2021-02-22 ENCOUNTER — APPOINTMENT (OUTPATIENT)
Dept: GENERAL RADIOLOGY | Facility: CLINIC | Age: 36
End: 2021-02-22
Attending: EMERGENCY MEDICINE
Payer: COMMERCIAL

## 2021-02-22 VITALS
OXYGEN SATURATION: 98 % | HEART RATE: 97 BPM | TEMPERATURE: 96.7 F | BODY MASS INDEX: 30.85 KG/M2 | SYSTOLIC BLOOD PRESSURE: 110 MMHG | RESPIRATION RATE: 13 BRPM | WEIGHT: 215 LBS | DIASTOLIC BLOOD PRESSURE: 77 MMHG

## 2021-02-22 DIAGNOSIS — T78.2XXA ANAPHYLAXIS, INITIAL ENCOUNTER: ICD-10-CM

## 2021-02-22 DIAGNOSIS — R55 VASOVAGAL SYNCOPE: ICD-10-CM

## 2021-02-22 LAB
ALBUMIN UR-MCNC: NEGATIVE MG/DL
AMORPH CRY #/AREA URNS HPF: ABNORMAL /HPF
ANION GAP SERPL CALCULATED.3IONS-SCNC: 9 MMOL/L (ref 3–14)
APPEARANCE UR: CLEAR
BASOPHILS # BLD AUTO: 0 10E9/L (ref 0–0.2)
BASOPHILS NFR BLD AUTO: 0.3 %
BILIRUB UR QL STRIP: NEGATIVE
BUN SERPL-MCNC: 17 MG/DL (ref 7–30)
CALCIUM SERPL-MCNC: 8.8 MG/DL (ref 8.5–10.1)
CHLORIDE SERPL-SCNC: 104 MMOL/L (ref 94–109)
CK SERPL-CCNC: 228 U/L (ref 30–300)
CO2 SERPL-SCNC: 25 MMOL/L (ref 20–32)
COLOR UR AUTO: YELLOW
CREAT SERPL-MCNC: 1.52 MG/DL (ref 0.66–1.25)
DIFFERENTIAL METHOD BLD: ABNORMAL
EOSINOPHIL # BLD AUTO: 0.2 10E9/L (ref 0–0.7)
EOSINOPHIL NFR BLD AUTO: 2.1 %
ERYTHROCYTE [DISTWIDTH] IN BLOOD BY AUTOMATED COUNT: 11.8 % (ref 10–15)
GFR SERPL CREATININE-BSD FRML MDRD: 58 ML/MIN/{1.73_M2}
GLUCOSE SERPL-MCNC: 183 MG/DL (ref 70–99)
GLUCOSE UR STRIP-MCNC: 70 MG/DL
HCT VFR BLD AUTO: 48.9 % (ref 40–53)
HGB BLD-MCNC: 16.2 G/DL (ref 13.3–17.7)
HGB UR QL STRIP: NEGATIVE
HYALINE CASTS #/AREA URNS LPF: 5 /LPF (ref 0–2)
IMM GRANULOCYTES # BLD: 0.1 10E9/L (ref 0–0.4)
IMM GRANULOCYTES NFR BLD: 0.5 %
KETONES UR STRIP-MCNC: NEGATIVE MG/DL
LEUKOCYTE ESTERASE UR QL STRIP: NEGATIVE
LYMPHOCYTES # BLD AUTO: 3.2 10E9/L (ref 0.8–5.3)
LYMPHOCYTES NFR BLD AUTO: 30.6 %
MCH RBC QN AUTO: 26.9 PG (ref 26.5–33)
MCHC RBC AUTO-ENTMCNC: 33.1 G/DL (ref 31.5–36.5)
MCV RBC AUTO: 81 FL (ref 78–100)
MONOCYTES # BLD AUTO: 0.8 10E9/L (ref 0–1.3)
MONOCYTES NFR BLD AUTO: 7.3 %
NEUTROPHILS # BLD AUTO: 6.3 10E9/L (ref 1.6–8.3)
NEUTROPHILS NFR BLD AUTO: 59.2 %
NITRATE UR QL: NEGATIVE
NRBC # BLD AUTO: 0 10*3/UL
NRBC BLD AUTO-RTO: 0 /100
PH UR STRIP: 6.5 PH (ref 5–7)
PLATELET # BLD AUTO: 278 10E9/L (ref 150–450)
POTASSIUM SERPL-SCNC: 3.3 MMOL/L (ref 3.4–5.3)
RBC # BLD AUTO: 6.03 10E12/L (ref 4.4–5.9)
RBC #/AREA URNS AUTO: 1 /HPF (ref 0–2)
SODIUM SERPL-SCNC: 138 MMOL/L (ref 133–144)
SOURCE: ABNORMAL
SP GR UR STRIP: 1.01 (ref 1–1.03)
SPERM #/AREA URNS HPF: PRESENT /HPF
TROPONIN I SERPL-MCNC: <0.015 UG/L (ref 0–0.04)
UROBILINOGEN UR STRIP-MCNC: 0 MG/DL (ref 0–2)
WBC # BLD AUTO: 10.6 10E9/L (ref 4–11)
WBC #/AREA URNS AUTO: <1 /HPF (ref 0–5)

## 2021-02-22 PROCEDURE — 93005 ELECTROCARDIOGRAM TRACING: CPT

## 2021-02-22 PROCEDURE — 250N000011 HC RX IP 250 OP 636: Performed by: EMERGENCY MEDICINE

## 2021-02-22 PROCEDURE — 96372 THER/PROPH/DIAG INJ SC/IM: CPT | Mod: 59

## 2021-02-22 PROCEDURE — 96374 THER/PROPH/DIAG INJ IV PUSH: CPT

## 2021-02-22 PROCEDURE — 250N000009 HC RX 250: Performed by: EMERGENCY MEDICINE

## 2021-02-22 PROCEDURE — 81001 URINALYSIS AUTO W/SCOPE: CPT | Performed by: EMERGENCY MEDICINE

## 2021-02-22 PROCEDURE — 84484 ASSAY OF TROPONIN QUANT: CPT | Performed by: EMERGENCY MEDICINE

## 2021-02-22 PROCEDURE — 96375 TX/PRO/DX INJ NEW DRUG ADDON: CPT

## 2021-02-22 PROCEDURE — 82550 ASSAY OF CK (CPK): CPT | Performed by: EMERGENCY MEDICINE

## 2021-02-22 PROCEDURE — 85025 COMPLETE CBC W/AUTO DIFF WBC: CPT | Performed by: EMERGENCY MEDICINE

## 2021-02-22 PROCEDURE — 80048 BASIC METABOLIC PNL TOTAL CA: CPT | Performed by: EMERGENCY MEDICINE

## 2021-02-22 PROCEDURE — 71045 X-RAY EXAM CHEST 1 VIEW: CPT

## 2021-02-22 PROCEDURE — 99285 EMERGENCY DEPT VISIT HI MDM: CPT | Mod: 25

## 2021-02-22 RX ORDER — EPINEPHRINE 0.3 MG/.3ML
0.3 INJECTION SUBCUTANEOUS
Qty: 1 EACH | Refills: 0 | Status: SHIPPED | OUTPATIENT
Start: 2021-02-22 | End: 2022-04-21

## 2021-02-22 RX ORDER — METHYLPREDNISOLONE SODIUM SUCCINATE 125 MG/2ML
125 INJECTION, POWDER, LYOPHILIZED, FOR SOLUTION INTRAMUSCULAR; INTRAVENOUS ONCE
Status: COMPLETED | OUTPATIENT
Start: 2021-02-22 | End: 2021-02-22

## 2021-02-22 RX ORDER — DIPHENHYDRAMINE HCL 25 MG
25 TABLET ORAL EVERY 6 HOURS PRN
Qty: 20 TABLET | Refills: 0 | Status: SHIPPED | OUTPATIENT
Start: 2021-02-22

## 2021-02-22 RX ORDER — PREDNISONE 20 MG/1
TABLET ORAL
Qty: 6 TABLET | Refills: 0 | Status: SHIPPED | OUTPATIENT
Start: 2021-02-22 | End: 2021-03-03

## 2021-02-22 RX ORDER — FAMOTIDINE 20 MG/1
20 TABLET, FILM COATED ORAL 2 TIMES DAILY
Qty: 6 TABLET | Refills: 0 | Status: SHIPPED | OUTPATIENT
Start: 2021-02-22 | End: 2021-02-25

## 2021-02-22 RX ADMIN — FAMOTIDINE 20 MG: 10 INJECTION, SOLUTION INTRAVENOUS at 20:18

## 2021-02-22 RX ADMIN — EPINEPHRINE 0.3 MG: 1 INJECTION INTRAMUSCULAR; INTRAVENOUS; SUBCUTANEOUS at 20:18

## 2021-02-22 RX ADMIN — METHYLPREDNISOLONE SODIUM SUCCINATE 125 MG: 125 INJECTION, POWDER, FOR SOLUTION INTRAMUSCULAR; INTRAVENOUS at 20:19

## 2021-02-22 ASSESSMENT — ENCOUNTER SYMPTOMS
SPEECH DIFFICULTY: 1
DIAPHORESIS: 1
SHORTNESS OF BREATH: 1
ABDOMINAL PAIN: 0
ROS SKIN COMMENTS: ITCHY
HEADACHES: 0

## 2021-02-23 LAB — INTERPRETATION ECG - MUSE: NORMAL

## 2021-02-23 NOTE — DISCHARGE INSTRUCTIONS
We are unsure whether your passing out spell was related to her allergic reaction or a vasovagal event.  Please if you develop itching and redness of the skin with lightheadedness and dizziness appropriate to give yourself an injection of epinephrine and take Benadryl and report to the emergency room for reassessment.  If you feel dizzy or lightheaded please sit down or lie down.  Due to a slight increase in your kidney function test we ask you to follow-up with your regular doctor to recheck this.  Return with recurrent or worsening symptoms.

## 2021-02-23 NOTE — ED PROVIDER NOTES
History   Chief Complaint:  Shortness of Breath     The patient presents from home via EMS.  RACHEL Dorman is a 36 year old male with history of GERD, HOMA, and depressive disorder who presents with shortness of breath and rash.  Per EMS, while walking his dog this evening, the patient started to become itchy, developed hives on his collarbone and chest as well as some bruising along his clavicle.  He took 50 mg benadryl PO prior to EMS arrival.  When EMS arrived, he was found pale, diaphoretic, with no palpable pulse on the toilet, and had a a hard time speaking secondary to shortness of breath.  When he was laid on the bed, some of his color returned.  His last blood pressure read was 108/71 with 400 ml of fluids en route, with clear lung sounds.    Per the patient, he has no known allergens and did has not taken his medications this evening.  He denies any chest pain, abdominal pain and headaches.  He currently has shortness of breath.    Review of Systems   Constitutional: Positive for diaphoresis.   Respiratory: Positive for shortness of breath.    Cardiovascular: Negative for chest pain.   Gastrointestinal: Negative for abdominal pain.   Skin: Positive for pallor and rash.        itchy   Neurological: Positive for speech difficulty. Negative for headaches.   All other systems reviewed and are negative.      Allergies:  No Known Drug Allergies    Medications:  Cymbalta  Vyvanse    Past Medical History:    ADHD  Depressive disorder  Epididymitis  GERD  Hiatal hernia  Testicular microlithiasis  Varicocele  Zenker's diverticulum  HOMA  Migraine    Past Surgical History:    Cochlear implant  Colonoscopy  EGD  LASIK eye surgery  Tonsillectomy  Mastoidectomy and tympanoplasty  Cyst removal    Family History:    Father: cancer, CAD  Mother:  Melanoma in throat, depression, anxiety    Social History:  Patient presents to ED via EMS.  PCP: Courtney Ronquillo      Physical Exam     Patient Vitals for the past 24  hrs:   BP Temp Temp src Pulse Resp SpO2 Weight   02/22/21 2144 106/73 -- -- 113 15 98 % --   02/22/21 2100 133/74 -- -- 84 15 98 % --   02/22/21 2030 125/69 -- -- 86 14 98 % --   02/22/21 2025 120/74 -- -- 68 9 97 % --   02/22/21 2022 -- -- -- -- -- 98 % --   02/22/21 2021 (!) 140/92 96.7  F (35.9  C) Oral 100 16 -- 97.5 kg (215 lb)   02/22/21 2020 121/80 -- -- 67 12 96 % --   02/22/21 2015 111/77 -- -- 68 9 100 % --       Physical Exam  Vitals signs reviewed.   HENT:      Head: Normocephalic.      Mouth/Throat:      Mouth: Mucous membranes are moist.   Eyes:      Pupils: Pupils are equal, round, and reactive to light.   Neck:      Musculoskeletal: Normal range of motion.   Cardiovascular:      Rate and Rhythm: Normal rate.   Pulmonary:      Effort: Pulmonary effort is normal.      Breath sounds: Normal breath sounds.   Musculoskeletal: Normal range of motion.   Skin:     General: Skin is warm.      Capillary Refill: Capillary refill takes less than 2 seconds.   Neurological:      General: No focal deficit present.      Mental Status: He is alert.   Psychiatric:         Mood and Affect: Mood normal.           Emergency Department Course   ECG  ECG taken at 2014, ECG read at 2017  Normal sinus rhythm  Nonspecific ST wave abnormality  Abnormal ECG   Rate 68 bpm. GA interval 156 ms. QRS duration 92 ms. QT/QTc 382/406 ms. P-R-T axes 21 53 66.     Imaging:    XR Chest Port 1 View  IMPRESSION: No pleural fluid or pneumothorax. No airspace disease or  edema. Normal size of the heart.    LESTER J FAHRNER, MD    Laboratory:    CBC: WBC: 10.6, HGB: 16.2, PLT: 278    BMP: Glucose 183(H), Potassium: 3.3(L), GFR: 58(L), Creatinine: 1.52(H) o/w WNL     Troponin (2017): <0.015    UA: Glucose: 70, Hyaline Casts: 5 (H), Amorphous Crystals: few, Sperm: Present, o/w Negative    CK total: 228      Emergency Department Course:    Reviewed:  I reviewed nursing notes, vitals and past medical history    Assessments:  2015 I obtained  history and examined the patient as noted above.   2053 I rechecked the patient and explained findings.   2130 Patient rechecked and updated.   2228 Patient rechecked and updated.     Interventions:  2018 Pepcid 20 mg IV  2018 Adrenalin 0.3 mg IM  2019 Solu-medrol 125 mg IV    Disposition:  The patient was discharged to home.       Impression & Plan       Medical Decision Making:  Patient presents with altered mental status and syncope on the toilet.  There is a history of prior prodrome of itching clinical presentation it could include anaphylaxis versus vasovagal syncope.  Patient denies chest pain or shortness of breath on arrival is awake and alert EMS really did not intervene in this case due to his history of syncope associate with itching and diaphoresis intramuscular epinephrine was given as well as Pepcid Solu-Medrol and Benadryl.  Patient was observed for a few hours with resolution in symptoms patient's EKG shows no concerns for QT or MO prolongation there is normal electrolytes.  Orthostatics are normal.  Care was discussed with the patient feel safe for discharge patient was offered ongoing steroid and Benadryl with the consideration of anaphylaxis as well as follow-up with primary care as indicated. Patient was discharged in stable condition.          Diagnosis:    ICD-10-CM    1. Vasovagal syncope  R55    2. Anaphylaxis, initial encounter  T78.2XXA        Discharge Medications:  New Prescriptions    DIPHENHYDRAMINE (BENADRYL) 25 MG TABLET    Take 1 tablet (25 mg) by mouth every 6 hours as needed for itching or allergies    EPINEPHRINE (ANY BX GENERIC EQUIV) 0.3 MG/0.3ML INJECTION 2-PACK    Inject 0.3 mLs (0.3 mg) into the muscle once as needed for anaphylaxis    FAMOTIDINE (PEPCID) 20 MG TABLET    Take 1 tablet (20 mg) by mouth 2 times daily for 3 days    PREDNISONE (DELTASONE) 20 MG TABLET    Take two tablets (= 40mg) each day for three ) days       Scribe Disclosure:  Elissa REIS, am serving as a  scribe at 8:14 PM on 2/22/2021 to document services personally performed by Sahil Steel MD based on my observations and the provider's statements to me.     [facility]         Sahil Steel MD  02/27/21 [id number]

## 2021-02-23 NOTE — ED TRIAGE NOTES
Patient was out walking dog, returned home. Patient reports hard time speaking. Itchy and hives began at home, self administered benadry 50mg.  EMS found patient on the toilet: pale, diaphoretic, no palpable pulses.  No known allergies.

## 2021-03-01 DIAGNOSIS — F90.9 ATTENTION DEFICIT HYPERACTIVITY DISORDER (ADHD), UNSPECIFIED ADHD TYPE: ICD-10-CM

## 2021-03-01 NOTE — TELEPHONE ENCOUNTER
Reason for call:  Medication   If this is a refill request, has the caller requested the refill from the pharmacy already? N/A  Will the patient be using a [facility]? No  Name of the pharmacy and phone number for the current request:   on file [facility] [place] on [address]    Name of the medication requested:   lisdexamfetamine (VYVANSE) 50 MG capsule    Other request:   This prescription is being denied at the pharmacy as it is still listed under Dr MELINA Johnson.  Per patient he wants ALL of his prescriptions to under KENYATTA Mulligan.  Call patient with any questions.  Patient is OUT of his medication for 3 days.   He has made multiple attempts to refill.    Phone number to reach patient:  Cell number on file:    Telephone Information:   Mobile [phone]       Best Time:  any    Can we leave a detailed message on this number?  YES    Travel screening: Not Applicable

## 2021-03-01 NOTE — TELEPHONE ENCOUNTER
Routing for Courtney's review.  Last ordered 1/25/21 30 caps.  Pending Prescriptions:                       Disp   Refills    lisdexamfetamine (VYVANSE) 50 MG capsule  30 cap*0            Sig: Take 1 capsule (50 mg) by mouth every morning

## 2021-03-02 RX ORDER — LISDEXAMFETAMINE DIMESYLATE 50 MG/1
50 CAPSULE ORAL EVERY MORNING
Qty: 30 CAPSULE | Refills: 0 | Status: SHIPPED | OUTPATIENT
Start: 2021-03-02 | End: 2021-03-28

## 2021-03-03 ENCOUNTER — OFFICE VISIT (OUTPATIENT)
Dept: FAMILY MEDICINE | Facility: CLINIC | Age: 36
End: 2021-03-03
Payer: COMMERCIAL

## 2021-03-03 VITALS
HEART RATE: 112 BPM | DIASTOLIC BLOOD PRESSURE: 97 MMHG | SYSTOLIC BLOOD PRESSURE: 140 MMHG | OXYGEN SATURATION: 96 % | WEIGHT: 215 LBS | BODY MASS INDEX: 30.85 KG/M2 | TEMPERATURE: 97.1 F

## 2021-03-03 DIAGNOSIS — L50.9 URTICARIA: Primary | ICD-10-CM

## 2021-03-03 DIAGNOSIS — T78.2XXD ANAPHYLAXIS, SUBSEQUENT ENCOUNTER: ICD-10-CM

## 2021-03-03 PROCEDURE — 99214 OFFICE O/P EST MOD 30 MIN: CPT | Performed by: PHYSICIAN ASSISTANT

## 2021-03-03 NOTE — PROGRESS NOTES
"  Joe Fair is a 36 year old who presents for the following health issues: ER f/u    HPI     Update: He is seeing his therapist on a weekly basis for depression/anxiety  He is trying to be better about getting enough sleep  Exercise is sporadic.    On the night of 2/22 family ordered pizza and then took the dog for a walk  No etoh that night  Hasn't had a drink since January  Towards the end of the walk started to get really itchy hands  Hands looked red, but no rash  He does have hx of hives  Once he got home felt itchy all over and had hives on legs, arms, torso and a few on his face  Took benadryl 50mg and showered and then started to feel lightheaded and felt swollen  Thought his lips/tongue looked somewhat swollen/heavy  He then felt weak/achy and couldn't stand up so wife called 911  After that had full body sweats  Had a small BM and paramedics arrived on the scene and brought him to ER    Pt given pepcid, epi pen, solu medrol in the ER  He was given IVF and started to feel more alert  He is not sure how long the hives lasted.  He slept a lot over the w/e  Today feeling back to baseline    The only other thing he ate that day was a sleeve of thin mint girlscout cookies  Pt has no known drug of food allergies  Did see an allergist years ago for urticaria but no testing was done  Pt feels heat and stress are triggers  Pizza and beer can also be triggers    He had some trouble getting Vyvance refilled so was out of that for 4 days and focus was worse. He was able to get his refill and restarted it yesterday and feels better from that.        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     Testicular pain, left     chronic, history of epididymitis     Varicocele     (L)     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,<11 Y/O  12/28/95     MASTOIDECTOMY  4/1997    (L) and type III tympanoplasty     SOFT TISSUE SURGERY  2020    Cyst removal     Social History     Tobacco Use     Smoking status: Never Smoker     Smokeless tobacco: Never Used     Tobacco comment: no second hand smoke at home or work   Substance Use Topics     Alcohol use: Yes     Alcohol/week: 0.0 standard drinks     Comment: 1-2 drinks per months     Current Outpatient Medications   Medication Sig Dispense Refill     diphenhydrAMINE (BENADRYL) 25 MG tablet Take 1 tablet (25 mg) by mouth every 6 hours as needed for itching or allergies 20 tablet 0     DULoxetine (CYMBALTA) 30 MG capsule Take 1 capsule (30 mg) by mouth 2 times daily 60 capsule 3     EPINEPHrine (ANY BX GENERIC EQUIV) 0.3 MG/0.3ML injection 2-pack Inject 0.3 mLs (0.3 mg) into the muscle once as needed for anaphylaxis 1 each 0     lisdexamfetamine (VYVANSE) 50 MG capsule Take 1 capsule (50 mg) by mouth every morning 30 capsule 0      Allergies   Allergen Reactions     No Known Allergies      FAMILY HISTORY NOTED AND REVIEWED      PHYSICAL EXAM:    BP (!) 140/97 (BP Location: Left arm, Patient Position: Sitting)   Pulse 112   Temp 97.1  F (36.2  C) (Temporal)   Wt 97.5 kg (215 lb)   SpO2 96%   BMI 30.85 kg/m      Patient appears non toxic  Skin: no hives currently  Normal speech    Assessment and Plan:     (L50.9) Urticaria  (primary encounter diagnosis)  Comment: start zyrtec 10mg every day. See an allergist about hives.  Plan: ALLERGY/ASTHMA ADULT REFERRAL            (T78.2XXD) Anaphylaxis, subsequent encounter  Comment: pt has an epi pen  Plan: ALLERGY/ASTHMA ADULT REFERRAL          Spent 30 minutes FTF with patient of which over 50% was spent discussing the coordination of care and management of their ER visit.      Courtney Ronquillo PA-C

## 2021-03-09 ENCOUNTER — TRANSFERRED RECORDS (OUTPATIENT)
Dept: HEALTH INFORMATION MANAGEMENT | Facility: CLINIC | Age: 36
End: 2021-03-09

## 2021-03-09 DIAGNOSIS — T78.2XXA ANAPHYLAXIS, INITIAL ENCOUNTER: Primary | ICD-10-CM

## 2021-03-22 ENCOUNTER — TRANSFERRED RECORDS (OUTPATIENT)
Dept: HEALTH INFORMATION MANAGEMENT | Facility: CLINIC | Age: 36
End: 2021-03-22

## 2021-03-22 DIAGNOSIS — T78.2XXA ANAPHYLAXIS, INITIAL ENCOUNTER: ICD-10-CM

## 2021-03-22 LAB
RESULT: NORMAL
SEND OUTS MISC TEST CODE: NORMAL
SEND OUTS MISC TEST SPECIMEN: NORMAL
TEST NAME: NORMAL

## 2021-03-22 PROCEDURE — 86003 ALLG SPEC IGE CRUDE XTRC EA: CPT | Mod: 90 | Performed by: ALLERGY & IMMUNOLOGY

## 2021-03-22 PROCEDURE — 82570 ASSAY OF URINE CREATININE: CPT | Mod: 90 | Performed by: ALLERGY & IMMUNOLOGY

## 2021-03-22 PROCEDURE — 82542 COL CHROMOTOGRAPHY QUAL/QUAN: CPT | Mod: 90 | Performed by: ALLERGY & IMMUNOLOGY

## 2021-03-22 PROCEDURE — 83520 IMMUNOASSAY QUANT NOS NONAB: CPT | Performed by: ALLERGY & IMMUNOLOGY

## 2021-03-22 PROCEDURE — 84150 ASSAY OF PROSTAGLANDIN: CPT | Mod: 90 | Performed by: ALLERGY & IMMUNOLOGY

## 2021-03-22 PROCEDURE — 36415 COLL VENOUS BLD VENIPUNCTURE: CPT | Performed by: ALLERGY & IMMUNOLOGY

## 2021-03-22 PROCEDURE — 99000 SPECIMEN HANDLING OFFICE-LAB: CPT | Performed by: ALLERGY & IMMUNOLOGY

## 2021-03-23 LAB — MISCELLANEOUS TEST: NORMAL

## 2021-03-24 LAB — TRYPTASE SERPL-MCNC: 3.6 UG/L

## 2021-03-25 DIAGNOSIS — F32.0 MILD MAJOR DEPRESSION (H): ICD-10-CM

## 2021-03-25 LAB — LEUKOTRIENE E4 URINE: <10 PG/MG CR

## 2021-03-26 LAB
ALPHA-GAL IGE QN: <0.1 KU/L
CREAT UR-MCNC: 193 MG/DL
ME-HISTAMINE/CREAT UR: 72 MCG/G CR (ref 30–200)

## 2021-03-26 RX ORDER — DULOXETIN HYDROCHLORIDE 30 MG/1
30 CAPSULE, DELAYED RELEASE ORAL 2 TIMES DAILY
Qty: 60 CAPSULE | Refills: 0 | Status: SHIPPED | OUTPATIENT
Start: 2021-03-26 | End: 2021-05-11

## 2021-03-26 NOTE — TELEPHONE ENCOUNTER
Medication is being filled for 1 time refill only due to:  sent PHQ9 via [facility] to pt to complete.      Bhumi MCFADDEN, RN      March 26, 2021  10:11 AM

## 2021-03-28 ENCOUNTER — MYC REFILL (OUTPATIENT)
Dept: FAMILY MEDICINE | Facility: CLINIC | Age: 36
End: 2021-03-28

## 2021-03-28 DIAGNOSIS — F90.9 ATTENTION DEFICIT HYPERACTIVITY DISORDER (ADHD), UNSPECIFIED ADHD TYPE: ICD-10-CM

## 2021-03-30 RX ORDER — LISDEXAMFETAMINE DIMESYLATE 50 MG/1
50 CAPSULE ORAL EVERY MORNING
Qty: 30 CAPSULE | Refills: 0 | Status: SHIPPED | OUTPATIENT
Start: 2021-03-30 | End: 2021-04-26

## 2021-04-03 LAB — 2,3 11B PROSTAGLANDIN F2A UR: 1253 PG/MG CR

## 2021-04-26 ENCOUNTER — MYC REFILL (OUTPATIENT)
Dept: FAMILY MEDICINE | Facility: CLINIC | Age: 36
End: 2021-04-26

## 2021-04-26 DIAGNOSIS — F90.9 ATTENTION DEFICIT HYPERACTIVITY DISORDER (ADHD), UNSPECIFIED ADHD TYPE: ICD-10-CM

## 2021-04-26 NOTE — TELEPHONE ENCOUNTER
Routing refill request to provider for review/approval because:  Drug not on the [facility] refill protocol     Pending Prescriptions:                       Disp   Refills    lisdexamfetamine (VYVANSE) 50 MG capsule  30 cap*0            Sig: Take 1 capsule (50 mg) by mouth every morning    Last Written Prescription Date:  2/22/2021  Last Fill Quantity: 20,  # refills: 0   Last office visit: 3/3/2021 with prescribing provider:  Courtney Ronquillo   Future Office Visit:      Katie Coronado RN  [facility]

## 2021-04-27 RX ORDER — LISDEXAMFETAMINE DIMESYLATE 50 MG/1
50 CAPSULE ORAL EVERY MORNING
Qty: 30 CAPSULE | Refills: 0 | Status: SHIPPED | OUTPATIENT
Start: 2021-04-27 | End: 2021-05-26

## 2021-05-11 DIAGNOSIS — F32.0 MILD MAJOR DEPRESSION (H): ICD-10-CM

## 2021-05-11 NOTE — TELEPHONE ENCOUNTER
LOV 10- LL for routine physical    PHQ-9 score:    PHQ 3/28/2021   PHQ-9 Total Score 12   Q9: Thoughts of better off dead/self-harm past 2 weeks Not at all         Pippa Hood RT (R)

## 2021-05-12 RX ORDER — DULOXETIN HYDROCHLORIDE 30 MG/1
30 CAPSULE, DELAYED RELEASE ORAL 2 TIMES DAILY
Qty: 180 CAPSULE | Refills: 1 | Status: SHIPPED | OUTPATIENT
Start: 2021-05-12 | End: 2021-10-19

## 2021-05-12 NOTE — TELEPHONE ENCOUNTER
Routing refill request to provider for review/approval because:  See message below.   PHQ-9 score:    PHQ 3/28/2021   PHQ-9 Total Score 12   Q9: Thoughts of better off dead/self-harm past 2 weeks Not at all     RN unable to refill.     Bhumi GUZMAN RN,BSN

## 2021-05-26 ENCOUNTER — MYC REFILL (OUTPATIENT)
Dept: FAMILY MEDICINE | Facility: CLINIC | Age: 36
End: 2021-05-26

## 2021-05-26 DIAGNOSIS — F90.9 ATTENTION DEFICIT HYPERACTIVITY DISORDER (ADHD), UNSPECIFIED ADHD TYPE: ICD-10-CM

## 2021-05-27 RX ORDER — LISDEXAMFETAMINE DIMESYLATE 50 MG/1
50 CAPSULE ORAL EVERY MORNING
Qty: 30 CAPSULE | Refills: 0 | Status: SHIPPED | OUTPATIENT
Start: 2021-05-27 | End: 2021-06-23

## 2021-05-27 NOTE — TELEPHONE ENCOUNTER
Routing refill request to provider for review/approval because:  Drug not on the [facility] refill protocol   Last Written Prescription Date:  4/27/21  Last Fill Quantity: 30,  # refills: 0   Last appointment  3/3/21 with prescribing provider:  Courtney GUZMAN RN,BSN

## 2021-06-16 NOTE — MR AVS SNAPSHOT
After Visit Summary   10/27/2017    Manjula Dorman    MRN: [id number]           Patient Information     Date Of Birth          1985        Visit Information        Provider Department      10/27/2017 9:30 AM Ray Johnson MD [facility]        Today's Diagnoses     Attention deficit hyperactivity disorder (ADHD), unspecified ADHD type        Depression with anxiety          Care Instructions    Call doctor if your depression/anxiety worsens, or if your sexual dysfunction symptoms persist.    Follow up in 2 months.          Follow-ups after your visit        Who to contact     If you have questions or need follow up information about today's clinic visit or your schedule please contact [facility] directly at [phone].  Normal or non-critical lab and imaging results will be communicated to you by MyChart, letter or phone within 4 business days after the clinic has received the results. If you do not hear from us within 7 days, please contact the clinic through [employer] or phone. If you have a critical or abnormal lab result, we will notify you by phone as soon as possible.  Submit refill requests through [employer] or call your pharmacy and they will forward the refill request to us. Please allow 3 business days for your refill to be completed.          Additional Information About Your Visit        MyChart Information     [employer] gives you secure access to your electronic health record. If you see a primary care provider, you can also send messages to your care team and make appointments. If you have questions, please call your primary care clinic.  If you do not have a primary care provider, please call [phone] and they will assist you.        Care EveryWhere ID     This is your Care EveryWhere ID. This could be used by other organizations to access your [place] medical records  [id number]        Your Vitals Were     Pulse Temperature Height Pulse  1. Have you been to the ER, urgent care clinic or hospitalized since your last visit? NO.     2. Have you seen or consulted any other health care providers outside of the [address] [place] [address] since your last visit (Include any pap smears or colon screening)?  NO "Oximetry BMI (Body Mass Index)       85 97.6  F (36.4  C) (Oral) 5' 10\" (1.778 m) 96% 28.84 kg/m2        Blood Pressure from Last 3 Encounters:   10/27/17 121/70   04/04/17 127/81   03/09/17 125/79    Weight from Last 3 Encounters:   10/27/17 201 lb (91.2 kg)   04/04/17 200 lb (90.7 kg)   03/09/17 201 lb (91.2 kg)              Today, you had the following     No orders found for display         Today's Medication Changes          These changes are accurate as of: 10/27/17 10:05 AM.  If you have any questions, ask your nurse or doctor.               These medicines have changed or have updated prescriptions.        Dose/Directions    * amphetamine-dextroamphetamine 10 MG per tablet   Commonly known as:  ADDERALL   This may have changed:    - when to take this  - additional instructions  - Another medication with the same name was removed. Continue taking this medication, and follow the directions you see here.   Used for:  Attention deficit hyperactivity disorder (ADHD), unspecified ADHD type   Changed by:  Ray Johnson MD        Dose:  10 mg   Take 1 tablet (10 mg) by mouth daily Take in the afternoon as needed   Quantity:  30 tablet   Refills:  0       * amphetamine-dextroamphetamine 30 MG per 24 hr capsule   Commonly known as:  ADDERALL XR   This may have changed:  You were already taking a medication with the same name, and this prescription was added. Make sure you understand how and when to take each.   Used for:  Attention deficit hyperactivity disorder (ADHD), unspecified ADHD type   Replaces:  amphetamine-dextroamphetamine 20 MG per 24 hr capsule   Changed by:  Ray Johnson MD        Dose:  30 mg   Take 1 capsule (30 mg) by mouth daily   Quantity:  30 capsule   Refills:  0       sertraline 50 MG tablet   Commonly known as:  ZOLOFT   This may have changed:    - how much to take  - Another medication with the same name was removed. Continue taking this medication, and " follow the directions you see here.   Used for:  Depression with anxiety   Changed by:  Ray Johnson MD        Dose:  75 mg   Take 1.5 tablets (75 mg) by mouth daily   Quantity:  45 tablet   Refills:  1       * Notice:  This list has 2 medication(s) that are the same as other medications prescribed for you. Read the directions carefully, and ask your doctor or other care provider to review them with you.      Stop taking these medicines if you haven't already. Please contact your care team if you have questions.     amphetamine-dextroamphetamine 20 MG per 24 hr capsule   Commonly known as:  ADDERALL XR   Replaced by:  amphetamine-dextroamphetamine 30 MG per 24 hr capsule   You also have another medication with the same name that you need to continue taking as instructed.   Stopped by:  Ray Johnson MD                Where to get your medicines      These medications were sent to [facility] [id number] [place] [address] [address] AT [facility] [address] [facility] [address]     Phone:  [phone]     sertraline 50 MG tablet         Some of these will need a paper prescription and others can be bought over the counter.  Ask your nurse if you have questions.     Bring a paper prescription for each of these medications     amphetamine-dextroamphetamine 10 MG per tablet    amphetamine-dextroamphetamine 30 MG per 24 hr capsule                Primary Care Provider Office Phone # Fax #    Ray Johnson [phone] [address]  [facility] [address]        Equal Access to Services     [facility] AH: Hadii aad ku hadasho Soomaali, waaxda luqadaha, qaybta kaalmada adeegyada, waxay sruthi hayjarret lawson. So [facility] [phone].    ATENCIÓN: Si habla español, tiene a gil disposición servicios gratuitos de asistencia lingüística. Llame al [phone].    We comply with applicable  federal civil rights laws and [place] laws. We do not discriminate on the basis of race, color, national origin, age, disability, sex, sexual orientation, or gender identity.            Thank you!     Thank you for choosing [facility]  for your care. Our goal is always to provide you with excellent care. Hearing back from our patients is one way we can continue to improve our services. Please take a few minutes to complete the written survey that you may receive in the mail after your visit with us. Thank you!             Your Updated Medication List - Protect others around you: Learn how to safely use, store and throw away your medicines at www.disposemymeds.org.          This list is accurate as of: 10/27/17 10:05 AM.  Always use your most recent med list.                   Brand Name Dispense Instructions for use Diagnosis    * amphetamine-dextroamphetamine 10 MG per tablet    ADDERALL    30 tablet    Take 1 tablet (10 mg) by mouth daily Take in the afternoon as needed    Attention deficit hyperactivity disorder (ADHD), unspecified ADHD type       * amphetamine-dextroamphetamine 30 MG per 24 hr capsule    ADDERALL XR    30 capsule    Take 1 capsule (30 mg) by mouth daily    Attention deficit hyperactivity disorder (ADHD), unspecified ADHD type       sertraline 50 MG tablet    ZOLOFT    45 tablet    Take 1.5 tablets (75 mg) by mouth daily    Depression with anxiety       * Notice:  This list has 2 medication(s) that are the same as other medications prescribed for you. Read the directions carefully, and ask your doctor or other care provider to review them with you.

## 2021-08-09 ENCOUNTER — MYC REFILL (OUTPATIENT)
Dept: FAMILY MEDICINE | Facility: CLINIC | Age: 36
End: 2021-08-09

## 2021-08-09 DIAGNOSIS — F90.9 ATTENTION DEFICIT HYPERACTIVITY DISORDER (ADHD), UNSPECIFIED ADHD TYPE: ICD-10-CM

## 2021-08-10 RX ORDER — LISDEXAMFETAMINE DIMESYLATE 50 MG/1
50 CAPSULE ORAL EVERY MORNING
Qty: 30 CAPSULE | Refills: 0 | Status: SHIPPED | OUTPATIENT
Start: 2021-08-10 | End: 2021-09-07

## 2021-08-10 NOTE — TELEPHONE ENCOUNTER
Routing refill request to provider for review/approval because:  Drug not on the [facility] refill protocol     Last Written Prescription Date:  7/1/21  Last Fill Quantity: 30,  # refills: 0   Last appointment  3/3/21 with prescribing provider:  Amanda GUZMAN RN,BSN

## 2021-09-07 ENCOUNTER — MYC REFILL (OUTPATIENT)
Dept: FAMILY MEDICINE | Facility: CLINIC | Age: 36
End: 2021-09-07

## 2021-09-07 DIAGNOSIS — F90.9 ATTENTION DEFICIT HYPERACTIVITY DISORDER (ADHD), UNSPECIFIED ADHD TYPE: ICD-10-CM

## 2021-09-08 RX ORDER — LISDEXAMFETAMINE DIMESYLATE 50 MG/1
50 CAPSULE ORAL EVERY MORNING
Qty: 30 CAPSULE | Refills: 0 | Status: SHIPPED | OUTPATIENT
Start: 2021-09-08 | End: 2021-10-06

## 2021-09-08 NOTE — TELEPHONE ENCOUNTER
lisdexamfetamine (VYVANSE) 50 MG capsule 30 capsule 0 8/10/2021  No   Sig - Route: Take 1 capsule (50 mg) by mouth every morning - Oral   Sent to pharmacy as: Lisdexamfetamine Dimesylate 50 MG Oral Capsule (Vyvanse)   Class: E-Prescribe   Earliest Fill Date: 8/10/2021   Order: [id number]   E-Prescribing Status: Receipt confirmed by pharmacy (8/10/2021  1:32 PM CDT)     Last OV 3/3/21     Routing refill request to provider for review/approval because:  Drug not on the [facility] refill protocol     Junie MARSHALL RN

## 2021-09-11 ENCOUNTER — HEALTH MAINTENANCE LETTER (OUTPATIENT)
Age: 36
End: 2021-09-11

## 2021-09-18 NOTE — TELEPHONE ENCOUNTER
Message from [employer] System[employer]:  Original authorizing provider: Tod Grossman MD, MD Manjula Dorman would like a refill of the following medications:  sertraline (ZOLOFT) 50 MG tablet [Tod Grossman MD, MD]    Preferred pharmacy: [facility] [address] [place] [address] [address] AT [facility] [address]    Comment:  Hello - I need to refill my Zoloft prescription. Thank you!  
Yes-Patient/Caregiver accepts free interpretation services...

## 2021-10-06 ENCOUNTER — MYC REFILL (OUTPATIENT)
Dept: FAMILY MEDICINE | Facility: CLINIC | Age: 36
End: 2021-10-06

## 2021-10-06 DIAGNOSIS — F90.9 ATTENTION DEFICIT HYPERACTIVITY DISORDER (ADHD), UNSPECIFIED ADHD TYPE: ICD-10-CM

## 2021-10-07 RX ORDER — LISDEXAMFETAMINE DIMESYLATE 50 MG/1
50 CAPSULE ORAL EVERY MORNING
Qty: 30 CAPSULE | Refills: 0 | Status: SHIPPED | OUTPATIENT
Start: 2021-10-07 | End: 2021-10-19 | Stop reason: DRUGHIGH

## 2021-10-07 NOTE — TELEPHONE ENCOUNTER
lisdexamfetamine (VYVANSE) 50 MG capsule 30 capsule 0 9/8/2021  No   Sig - Route: Take 1 capsule (50 mg) by mouth every morning - Oral   Sent to pharmacy as: Lisdexamfetamine Dimesylate 50 MG Oral Capsule (Vyvanse)   Class: E-Prescribe   Earliest Fill Date: 9/8/2021   Order: [id number]   E-Prescribing Status: Receipt confirmed by pharmacy (9/8/2021  5:10 PM CDT)       Printout Tracking    External Result Report     Pharmacy    [id number] - [place], [place] - [address] AT [facility] OF [place] [address]     Last OV 3/3/21    Routing refill request to provider for review/approval because:  Drug not on the [facility] refill protocol     Junie MARSHALL RN
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
## 2021-10-19 ENCOUNTER — OFFICE VISIT (OUTPATIENT)
Dept: FAMILY MEDICINE | Facility: CLINIC | Age: 36
End: 2021-10-19
Payer: COMMERCIAL

## 2021-10-19 VITALS
OXYGEN SATURATION: 98 % | RESPIRATION RATE: 16 BRPM | BODY MASS INDEX: 31.5 KG/M2 | HEART RATE: 88 BPM | TEMPERATURE: 97.4 F | SYSTOLIC BLOOD PRESSURE: 130 MMHG | DIASTOLIC BLOOD PRESSURE: 88 MMHG | WEIGHT: 220 LBS | HEIGHT: 70 IN

## 2021-10-19 DIAGNOSIS — Z00.00 ROUTINE GENERAL MEDICAL EXAMINATION AT A HEALTH CARE FACILITY: Primary | ICD-10-CM

## 2021-10-19 DIAGNOSIS — F90.9 ATTENTION DEFICIT HYPERACTIVITY DISORDER (ADHD), UNSPECIFIED ADHD TYPE: ICD-10-CM

## 2021-10-19 DIAGNOSIS — E78.5 HYPERLIPIDEMIA LDL GOAL <160: ICD-10-CM

## 2021-10-19 DIAGNOSIS — F32.0 MILD MAJOR DEPRESSION (H): ICD-10-CM

## 2021-10-19 DIAGNOSIS — N52.9 ERECTILE DYSFUNCTION, UNSPECIFIED ERECTILE DYSFUNCTION TYPE: ICD-10-CM

## 2021-10-19 DIAGNOSIS — Z23 NEED FOR PROPHYLACTIC VACCINATION AND INOCULATION AGAINST INFLUENZA: ICD-10-CM

## 2021-10-19 PROBLEM — F32.9 MAJOR DEPRESSION: Status: ACTIVE | Noted: 2018-07-26

## 2021-10-19 LAB
ALBUMIN SERPL-MCNC: 3.8 G/DL (ref 3.4–5)
ALP SERPL-CCNC: 62 U/L (ref 40–150)
ALT SERPL W P-5'-P-CCNC: 50 U/L (ref 0–70)
ANION GAP SERPL CALCULATED.3IONS-SCNC: 6 MMOL/L (ref 3–14)
AST SERPL W P-5'-P-CCNC: 32 U/L (ref 0–45)
BILIRUB SERPL-MCNC: 0.4 MG/DL (ref 0.2–1.3)
BUN SERPL-MCNC: 16 MG/DL (ref 7–30)
CALCIUM SERPL-MCNC: 8.7 MG/DL (ref 8.5–10.1)
CHLORIDE BLD-SCNC: 103 MMOL/L (ref 94–109)
CHOLEST SERPL-MCNC: 221 MG/DL
CO2 SERPL-SCNC: 26 MMOL/L (ref 20–32)
CREAT SERPL-MCNC: 1.27 MG/DL (ref 0.66–1.25)
ERYTHROCYTE [DISTWIDTH] IN BLOOD BY AUTOMATED COUNT: 12.8 % (ref 10–15)
FASTING STATUS PATIENT QL REPORTED: YES
GFR SERPL CREATININE-BSD FRML MDRD: 72 ML/MIN/1.73M2
GLUCOSE BLD-MCNC: 99 MG/DL (ref 70–99)
HCT VFR BLD AUTO: 42.1 % (ref 40–53)
HDLC SERPL-MCNC: 35 MG/DL
HGB BLD-MCNC: 14.6 G/DL (ref 13.3–17.7)
LDLC SERPL CALC-MCNC: 152 MG/DL
MCH RBC QN AUTO: 29.2 PG (ref 26.5–33)
MCHC RBC AUTO-ENTMCNC: 34.7 G/DL (ref 31.5–36.5)
MCV RBC AUTO: 84 FL (ref 78–100)
NONHDLC SERPL-MCNC: 186 MG/DL
PLATELET # BLD AUTO: 267 10E3/UL (ref 150–450)
POTASSIUM BLD-SCNC: 4.3 MMOL/L (ref 3.4–5.3)
PROT SERPL-MCNC: 7.5 G/DL (ref 6.8–8.8)
RBC # BLD AUTO: 5 10E6/UL (ref 4.4–5.9)
SODIUM SERPL-SCNC: 135 MMOL/L (ref 133–144)
TRIGL SERPL-MCNC: 170 MG/DL
WBC # BLD AUTO: 5.5 10E3/UL (ref 4–11)

## 2021-10-19 PROCEDURE — 99395 PREV VISIT EST AGE 18-39: CPT | Mod: 25 | Performed by: PHYSICIAN ASSISTANT

## 2021-10-19 PROCEDURE — 96127 BRIEF EMOTIONAL/BEHAV ASSMT: CPT | Performed by: PHYSICIAN ASSISTANT

## 2021-10-19 PROCEDURE — 90471 IMMUNIZATION ADMIN: CPT | Performed by: PHYSICIAN ASSISTANT

## 2021-10-19 PROCEDURE — 80061 LIPID PANEL: CPT | Performed by: PHYSICIAN ASSISTANT

## 2021-10-19 PROCEDURE — 84403 ASSAY OF TOTAL TESTOSTERONE: CPT | Performed by: PHYSICIAN ASSISTANT

## 2021-10-19 PROCEDURE — 99214 OFFICE O/P EST MOD 30 MIN: CPT | Mod: 25 | Performed by: PHYSICIAN ASSISTANT

## 2021-10-19 PROCEDURE — 36415 COLL VENOUS BLD VENIPUNCTURE: CPT | Performed by: PHYSICIAN ASSISTANT

## 2021-10-19 PROCEDURE — 80053 COMPREHEN METABOLIC PANEL: CPT | Performed by: PHYSICIAN ASSISTANT

## 2021-10-19 PROCEDURE — 90686 IIV4 VACC NO PRSV 0.5 ML IM: CPT | Performed by: PHYSICIAN ASSISTANT

## 2021-10-19 PROCEDURE — 85027 COMPLETE CBC AUTOMATED: CPT | Performed by: PHYSICIAN ASSISTANT

## 2021-10-19 RX ORDER — DULOXETIN HYDROCHLORIDE 30 MG/1
30 CAPSULE, DELAYED RELEASE ORAL 2 TIMES DAILY
Qty: 180 CAPSULE | Refills: 1 | Status: SHIPPED | OUTPATIENT
Start: 2021-10-19 | End: 2022-04-21

## 2021-10-19 RX ORDER — LISDEXAMFETAMINE DIMESYLATE 60 MG/1
60 CAPSULE ORAL EVERY MORNING
Qty: 30 CAPSULE | Refills: 0 | Status: SHIPPED | OUTPATIENT
Start: 2021-10-19 | End: 2021-11-17

## 2021-10-19 ASSESSMENT — ENCOUNTER SYMPTOMS
NAUSEA: 0
CHILLS: 0
CONSTIPATION: 0
SHORTNESS OF BREATH: 0
MYALGIAS: 0
PARESTHESIAS: 0
ARTHRALGIAS: 0
SORE THROAT: 0
WEAKNESS: 0
HEMATURIA: 0
FREQUENCY: 0
NERVOUS/ANXIOUS: 1
HEARTBURN: 0
PALPITATIONS: 0
DIZZINESS: 0
DIARRHEA: 0
HEADACHES: 0
EYE PAIN: 0
COUGH: 0
ABDOMINAL PAIN: 0
DYSURIA: 0
JOINT SWELLING: 0
HEMATOCHEZIA: 0
FEVER: 0

## 2021-10-19 ASSESSMENT — PATIENT HEALTH QUESTIONNAIRE - PHQ9: SUM OF ALL RESPONSES TO PHQ QUESTIONS 1-9: 8

## 2021-10-19 ASSESSMENT — MIFFLIN-ST. JEOR: SCORE: 1934.16

## 2021-10-20 NOTE — RESULT ENCOUNTER NOTE
It was a pleasure seeing you for your physical examination.  I wanted to get back to you with your test results.      I am happy to report that your CBC or complete blood count is normal with no signs of anemia, leukemia or platelet abnormalities. Your chemistry panel shows no signs of diabetes.  Your blood salts, kidney tests, liver tests, and proteins are all fine/stable.    Your total cholesterol is 221 with the normal range being below 200.  Your HDL or good cholesterol is 35 with the normal range being above 50.  Your LDL or bad cholesterol is 152 with the normal range being below 130.  Keep working on a low cholesterol/high fiber diet and increase exercise to every other day at least.      Courtney Ronquillo PA-C

## 2021-10-21 LAB — TESTOST SERPL-MCNC: 452 NG/DL (ref 240–950)

## 2021-10-22 NOTE — RESULT ENCOUNTER NOTE
Tasha Fair,    This is to inform you regarding your test result.    I am covering for Courtney Shima  Your testosterone level is normal.    Sincerely,      Dr.Nasima José Antonio MD,FACP

## 2021-11-17 ENCOUNTER — MYC REFILL (OUTPATIENT)
Dept: FAMILY MEDICINE | Facility: CLINIC | Age: 36
End: 2021-11-17
Payer: COMMERCIAL

## 2021-11-17 DIAGNOSIS — F90.9 ATTENTION DEFICIT HYPERACTIVITY DISORDER (ADHD), UNSPECIFIED ADHD TYPE: ICD-10-CM

## 2021-11-17 DIAGNOSIS — F32.0 MILD MAJOR DEPRESSION (H): ICD-10-CM

## 2021-11-18 RX ORDER — DULOXETIN HYDROCHLORIDE 30 MG/1
30 CAPSULE, DELAYED RELEASE ORAL 2 TIMES DAILY
Qty: 180 CAPSULE | Refills: 1 | OUTPATIENT
Start: 2021-11-18

## 2021-11-18 RX ORDER — LISDEXAMFETAMINE DIMESYLATE 60 MG/1
60 CAPSULE ORAL EVERY MORNING
Qty: 30 CAPSULE | Refills: 0 | Status: SHIPPED | OUTPATIENT
Start: 2021-11-18 | End: 2021-12-17

## 2021-11-18 NOTE — TELEPHONE ENCOUNTER
Routing refill request to provider for review/approval because:  Drug not on the [facility] refill protocol   Dasha Mensah RN

## 2021-11-18 NOTE — TELEPHONE ENCOUNTER
Duloxetine  mg capsules    Summary: Take 1 capsule (30 mg) by mouth 2 times daily, Disp-180 capsule, R-1, E-Prescribe   Dose, Route, Frequency: 30 mg, Oral, 2 TIMES DAILY  Start: 10/19/2021  Ord/Sold: 10/19/2021    LOV 10/19/21

## 2021-11-18 NOTE — TELEPHONE ENCOUNTER
DULoxetine (CYMBALTA) 30 MG capsule 180 capsule 1 10/19/2021  No   Sig - Route: Take 1 capsule (30 mg) by mouth 2 times daily - Oral   Sent to pharmacy as: DULoxetine HCl 30 MG Oral Capsule Delayed Release Particles (CYMBALTA)   Class: E-Prescribe   Order: [id number]   E-Prescribing Status: Receipt confirmed by pharmacy (10/19/2021  7:29 AM CDT)       Printout Tracking    External Result Report     Pharmacy    [id number] - [place] - [id number] AT [facility] [address]     Rx refused. Duplicate/early request. Pharmacy notified.

## 2021-12-17 ENCOUNTER — MYC REFILL (OUTPATIENT)
Dept: FAMILY MEDICINE | Facility: CLINIC | Age: 36
End: 2021-12-17
Payer: COMMERCIAL

## 2021-12-17 DIAGNOSIS — F90.9 ATTENTION DEFICIT HYPERACTIVITY DISORDER (ADHD), UNSPECIFIED ADHD TYPE: ICD-10-CM

## 2021-12-20 RX ORDER — LISDEXAMFETAMINE DIMESYLATE 60 MG/1
60 CAPSULE ORAL EVERY MORNING
Qty: 30 CAPSULE | Refills: 0 | Status: SHIPPED | OUTPATIENT
Start: 2021-12-20 | End: 2022-01-14

## 2022-01-14 ENCOUNTER — MYC REFILL (OUTPATIENT)
Dept: FAMILY MEDICINE | Facility: CLINIC | Age: 37
End: 2022-01-14
Payer: COMMERCIAL

## 2022-01-14 DIAGNOSIS — F90.9 ATTENTION DEFICIT HYPERACTIVITY DISORDER (ADHD), UNSPECIFIED ADHD TYPE: ICD-10-CM

## 2022-01-17 RX ORDER — LISDEXAMFETAMINE DIMESYLATE 60 MG/1
60 CAPSULE ORAL EVERY MORNING
Qty: 30 CAPSULE | Refills: 0 | Status: SHIPPED | OUTPATIENT
Start: 2022-01-17 | End: 2022-02-20

## 2022-01-17 NOTE — TELEPHONE ENCOUNTER
Routing refill request to provider for review/approval because:  Drug not on the [facility] refill protocol   Last seen [id number]  Dasha Mensah RN

## 2022-03-20 ENCOUNTER — MYC REFILL (OUTPATIENT)
Dept: FAMILY MEDICINE | Facility: CLINIC | Age: 37
End: 2022-03-20
Payer: COMMERCIAL

## 2022-03-20 DIAGNOSIS — F90.9 ATTENTION DEFICIT HYPERACTIVITY DISORDER (ADHD), UNSPECIFIED ADHD TYPE: ICD-10-CM

## 2022-03-24 ENCOUNTER — MYC MEDICAL ADVICE (OUTPATIENT)
Dept: FAMILY MEDICINE | Facility: CLINIC | Age: 37
End: 2022-03-24
Payer: COMMERCIAL

## 2022-03-24 RX ORDER — LISDEXAMFETAMINE DIMESYLATE 60 MG/1
60 CAPSULE ORAL EVERY MORNING
Qty: 30 CAPSULE | Refills: 0 | Status: SHIPPED | OUTPATIENT
Start: 2022-03-24 | End: 2022-04-21

## 2022-03-24 NOTE — TELEPHONE ENCOUNTER
Routing refill request to provider for review/approval because:  Drug not on the [facility] refill protocol     Bashir Bonilla RN  [facility] Triage Nurse

## 2022-04-21 ENCOUNTER — VIRTUAL VISIT (OUTPATIENT)
Dept: FAMILY MEDICINE | Facility: CLINIC | Age: 37
End: 2022-04-21
Payer: COMMERCIAL

## 2022-04-21 DIAGNOSIS — F32.0 MILD MAJOR DEPRESSION (H): ICD-10-CM

## 2022-04-21 DIAGNOSIS — F90.9 ATTENTION DEFICIT HYPERACTIVITY DISORDER (ADHD), UNSPECIFIED ADHD TYPE: Primary | ICD-10-CM

## 2022-04-21 DIAGNOSIS — T78.2XXD ANAPHYLAXIS, SUBSEQUENT ENCOUNTER: ICD-10-CM

## 2022-04-21 PROCEDURE — 99213 OFFICE O/P EST LOW 20 MIN: CPT | Mod: GT | Performed by: PHYSICIAN ASSISTANT

## 2022-04-21 RX ORDER — DULOXETIN HYDROCHLORIDE 30 MG/1
30 CAPSULE, DELAYED RELEASE ORAL DAILY
Qty: 90 CAPSULE | Refills: 3 | Status: SHIPPED | OUTPATIENT
Start: 2022-04-21 | End: 2022-10-31

## 2022-04-21 RX ORDER — LISDEXAMFETAMINE DIMESYLATE 60 MG/1
60 CAPSULE ORAL EVERY MORNING
Qty: 30 CAPSULE | Refills: 0 | Status: SHIPPED | OUTPATIENT
Start: 2022-04-21 | End: 2022-05-19

## 2022-04-21 RX ORDER — EPINEPHRINE 0.3 MG/.3ML
0.3 INJECTION SUBCUTANEOUS
Qty: 1 EACH | Refills: 3 | Status: SHIPPED | OUTPATIENT
Start: 2022-04-21 | End: 2024-05-30

## 2022-04-21 ASSESSMENT — ANXIETY QUESTIONNAIRES
6. BECOMING EASILY ANNOYED OR IRRITABLE: NEARLY EVERY DAY
5. BEING SO RESTLESS THAT IT IS HARD TO SIT STILL: MORE THAN HALF THE DAYS
GAD7 TOTAL SCORE: 14
GAD7 TOTAL SCORE: 14
7. FEELING AFRAID AS IF SOMETHING AWFUL MIGHT HAPPEN: MORE THAN HALF THE DAYS
3. WORRYING TOO MUCH ABOUT DIFFERENT THINGS: MORE THAN HALF THE DAYS
7. FEELING AFRAID AS IF SOMETHING AWFUL MIGHT HAPPEN: MORE THAN HALF THE DAYS
2. NOT BEING ABLE TO STOP OR CONTROL WORRYING: SEVERAL DAYS
1. FEELING NERVOUS, ANXIOUS, OR ON EDGE: MORE THAN HALF THE DAYS
4. TROUBLE RELAXING: MORE THAN HALF THE DAYS
GAD7 TOTAL SCORE: 14

## 2022-04-21 ASSESSMENT — PATIENT HEALTH QUESTIONNAIRE - PHQ9
SUM OF ALL RESPONSES TO PHQ QUESTIONS 1-9: 11
SUM OF ALL RESPONSES TO PHQ QUESTIONS 1-9: 11
10. IF YOU CHECKED OFF ANY PROBLEMS, HOW DIFFICULT HAVE THESE PROBLEMS MADE IT FOR YOU TO DO YOUR WORK, TAKE CARE OF THINGS AT HOME, OR GET ALONG WITH OTHER PEOPLE: SOMEWHAT DIFFICULT

## 2022-04-21 NOTE — PROGRESS NOTES
Manjula is a 37 year old who is being evaluated via a billable video visit.      How would you like to obtain your AVS? MyChart  If the video visit is dropped, the invitation should be resent by:   Will anyone else be joining your video visit? No      Video Start Time: 3:49 PM        Subjective   Manjula is a 37 year old who presents for the following health issues: ADD    History of Present Illness       Mental Health Follow-up:  Patient presents to follow-up on Depression & Anxiety.Patient's depression since last visit has been:  Better  The patient is having other symptoms associated with depression.  Patient's anxiety since last visit has been:  Better  The patient is having other symptoms associated with anxiety.  Any significant life events: No  Patient is feeling anxious or having panic attacks.  Patient has no concerns about alcohol or drug use.       Today's PHQ-9         PHQ-9 Total Score: 11  PHQ-9 Q9 Thoughts of better off dead/self-harm past 2 weeks :   (P) Not at all    How difficult have these problems made it for you to do your work, take care of things at home, or get along with other people: Somewhat difficult    Today's HOMA-7 Score: 14    Reason for visit:  Medication follow-up and health status update    He eats 0-1 servings of fruits and vegetables daily.He consumes 3 sweetened beverage(s) daily.He exercises with enough effort to increase his heart rate 9 or less minutes per day.  He exercises with enough effort to increase his heart rate 3 or less days per week.   He is taking medications regularly.     He has hx of hives with anaphylaxis  Did see Dr. Carmen Ayala last year  ADD stable on Vyvance.    He is using albuterol one puff per day since Nov when he was in for cough and sob.  Urgent care note reviewed  He notes that when he gets a cold he coughs and wheezes for a long time.  He is not using the albuterol for sob but thought that would prevent wheezing.  He will go off of this and see me if  sxs recur.      Objective           Vitals:  No vitals were obtained today due to virtual visit.    Physical Exam   GENERAL: Healthy, alert and no distress  EYES: Eyes grossly normal to inspection.  No discharge or erythema, or obvious scleral/conjunctival abnormalities.  RESP: No audible wheeze, cough, or visible cyanosis.  No visible retractions or increased work of breathing.    SKIN: Visible skin clear. No significant rash, abnormal pigmentation or lesions.  NEURO: Cranial nerves grossly intact.  Mentation and speech appropriate for age.  PSYCH: Mentation appears normal, affect normal/bright, judgement and insight intact, normal speech and appearance well-groomed.    Assessment and Plan:     (F90.9) Attention deficit hyperactivity disorder (ADHD), unspecified ADHD type  (primary encounter diagnosis)  Comment: stable  Plan: lisdexamfetamine (VYVANSE) 60 MG capsule        Refilled #30    (F32.0) Mild major depression (H)  Comment: he cut down to 30mg every day.  Plan: DULoxetine (CYMBALTA) 30 MG capsule        Refilled #90 with 3 ref    (T78.2XXD) Anaphylaxis, subsequent encounter  Comment:   Plan: EPINEPHrine (ANY BX GENERIC EQUIV) 0.3 MG/0.3ML        injection 2-pack              Courtney Ronquillo PA-C              Video-Visit Details    Type of service:  Video Visit    Video End Time:4:08 PM    Originating Location (pt. Location): Home    Distant Location (provider location):  [facility]     Platform used for Video Visit: [email or web address]

## 2022-04-22 ASSESSMENT — ANXIETY QUESTIONNAIRES: GAD7 TOTAL SCORE: 14

## 2022-04-22 ASSESSMENT — PATIENT HEALTH QUESTIONNAIRE - PHQ9: SUM OF ALL RESPONSES TO PHQ QUESTIONS 1-9: 11

## 2022-05-19 ENCOUNTER — MYC REFILL (OUTPATIENT)
Dept: FAMILY MEDICINE | Facility: CLINIC | Age: 37
End: 2022-05-19
Payer: COMMERCIAL

## 2022-05-19 DIAGNOSIS — F90.9 ATTENTION DEFICIT HYPERACTIVITY DISORDER (ADHD), UNSPECIFIED ADHD TYPE: ICD-10-CM

## 2022-05-23 RX ORDER — LISDEXAMFETAMINE DIMESYLATE 60 MG/1
60 CAPSULE ORAL EVERY MORNING
Qty: 30 CAPSULE | Refills: 0 | Status: SHIPPED | OUTPATIENT
Start: 2022-05-23 | End: 2022-06-09

## 2022-05-23 NOTE — TELEPHONE ENCOUNTER
Routing refill request to provider for review/approval because:  Drug not on the [facility] refill protocol     LOV: 4/21/22    Jeanna Lopez RN  [facility]
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
## 2022-06-09 ENCOUNTER — VIRTUAL VISIT (OUTPATIENT)
Dept: FAMILY MEDICINE | Facility: CLINIC | Age: 37
End: 2022-06-09
Payer: COMMERCIAL

## 2022-06-09 DIAGNOSIS — F90.9 ATTENTION DEFICIT HYPERACTIVITY DISORDER (ADHD), UNSPECIFIED ADHD TYPE: ICD-10-CM

## 2022-06-09 DIAGNOSIS — F32.9 MAJOR DEPRESSIVE DISORDER, REMISSION STATUS UNSPECIFIED, UNSPECIFIED WHETHER RECURRENT: Primary | ICD-10-CM

## 2022-06-09 DIAGNOSIS — F41.1 GAD (GENERALIZED ANXIETY DISORDER): ICD-10-CM

## 2022-06-09 PROBLEM — R07.89 FEELING OF CHEST TIGHTNESS: Status: RESOLVED | Noted: 2019-10-07 | Resolved: 2022-06-09

## 2022-06-09 PROBLEM — R06.02 SOB (SHORTNESS OF BREATH): Status: RESOLVED | Noted: 2019-10-07 | Resolved: 2022-06-09

## 2022-06-09 PROBLEM — F41.9 ANXIETY: Status: ACTIVE | Noted: 2022-06-09

## 2022-06-09 PROCEDURE — 99214 OFFICE O/P EST MOD 30 MIN: CPT | Mod: GT | Performed by: INTERNAL MEDICINE

## 2022-06-09 RX ORDER — LISDEXAMFETAMINE DIMESYLATE 60 MG/1
60 CAPSULE ORAL EVERY MORNING
Qty: 30 CAPSULE | Refills: 0 | Status: SHIPPED | OUTPATIENT
Start: 2022-06-09 | End: 2022-06-23

## 2022-06-09 ASSESSMENT — PATIENT HEALTH QUESTIONNAIRE - PHQ9
10. IF YOU CHECKED OFF ANY PROBLEMS, HOW DIFFICULT HAVE THESE PROBLEMS MADE IT FOR YOU TO DO YOUR WORK, TAKE CARE OF THINGS AT HOME, OR GET ALONG WITH OTHER PEOPLE: SOMEWHAT DIFFICULT
SUM OF ALL RESPONSES TO PHQ QUESTIONS 1-9: 8
SUM OF ALL RESPONSES TO PHQ QUESTIONS 1-9: 8

## 2022-07-21 ENCOUNTER — MYC REFILL (OUTPATIENT)
Dept: FAMILY MEDICINE | Facility: CLINIC | Age: 37
End: 2022-07-21

## 2022-07-21 DIAGNOSIS — F90.9 ATTENTION DEFICIT HYPERACTIVITY DISORDER (ADHD), UNSPECIFIED ADHD TYPE: ICD-10-CM

## 2022-07-21 RX ORDER — LISDEXAMFETAMINE DIMESYLATE 60 MG/1
60 CAPSULE ORAL EVERY MORNING
Qty: 30 CAPSULE | Refills: 0 | Status: SHIPPED | OUTPATIENT
Start: 2022-07-21 | End: 2022-08-22

## 2022-08-22 ENCOUNTER — MYC REFILL (OUTPATIENT)
Dept: FAMILY MEDICINE | Facility: CLINIC | Age: 37
End: 2022-08-22

## 2022-08-22 DIAGNOSIS — F90.9 ATTENTION DEFICIT HYPERACTIVITY DISORDER (ADHD), UNSPECIFIED ADHD TYPE: ICD-10-CM

## 2022-08-24 RX ORDER — LISDEXAMFETAMINE DIMESYLATE 60 MG/1
60 CAPSULE ORAL EVERY MORNING
Qty: 30 CAPSULE | Refills: 0 | Status: SHIPPED | OUTPATIENT
Start: 2022-08-24 | End: 2022-09-20

## 2022-09-30 NOTE — ED NOTES
Bed: ED07  Expected date:   Expected time:   Means of arrival:   Comments:  Stab 1  
Bed: ST01  Expected date:   Expected time:   Means of arrival:   Comments:  89  36M SOB/Syncope/Hypotensive  2006  
Epi given.  
Juice and crackers given per request.  
Per pt when he got home from his walk with hives and hand swelling he had swelling in his face, lips and trouble talking.  
Pt calling wife to bring him some clothes.   
negative...

## 2022-10-20 ENCOUNTER — MYC REFILL (OUTPATIENT)
Dept: FAMILY MEDICINE | Facility: CLINIC | Age: 37
End: 2022-10-20

## 2022-10-20 DIAGNOSIS — F90.9 ATTENTION DEFICIT HYPERACTIVITY DISORDER (ADHD), UNSPECIFIED ADHD TYPE: ICD-10-CM

## 2022-10-24 RX ORDER — LISDEXAMFETAMINE DIMESYLATE 60 MG/1
60 CAPSULE ORAL EVERY MORNING
Qty: 30 CAPSULE | Refills: 0 | Status: SHIPPED | OUTPATIENT
Start: 2022-10-24 | End: 2022-11-20

## 2022-10-28 NOTE — PROGRESS NOTES
SUBJECTIVE:   CC: Manjula is an 37 year old who presents for preventative health visit.     He is doing well.  He is  and has 1 daughter age 4.  He has depression and anxiety are doing well.  His ADD is doing well.  He does note a few issues.    He notes his erections are not as firm as they used to be.  He wonders if it is a medication.    He has had for a few weeks some right-sided low back pain.  It does not go down into the legs and there is no leg tingling numbness or weakness.    His weight is an issue.  He exercises some.  He otherwise feels fine.      Patient has been advised of split billing requirements and indicates understanding: Yes  Healthy Habits:     Getting at least 3 servings of Calcium per day:  NO    Bi-annual eye exam:  Yes    Dental care twice a year:  Yes    Sleep apnea or symptoms of sleep apnea:  Daytime drowsiness    Diet:  Regular (no restrictions)    Frequency of exercise:  1 day/week    Duration of exercise:  Less than 15 minutes    Taking medications regularly:  Yes    Medication side effects:  Other    PHQ-2 Total Score: 2    Additional concerns today:  Yes              Today's PHQ-2 Score:   PHQ-2 ( 1999 [employer]) 6/9/2022   Q1: Little interest or pleasure in doing things 0   Q2: Feeling down, depressed or hopeless 0   PHQ-2 Score 0   PHQ-2 Total Score (12-17 Years)- Positive if 3 or more points; Administer PHQ-A if positive -   Q1: Little interest or pleasure in doing things -   Q2: Feeling down, depressed or hopeless -   PHQ-2 Score -       Abuse: Current or Past(Physical, Sexual or Emotional)- No  Do you feel safe in your environment? Yes        Social History     Tobacco Use     Smoking status: Never     Smokeless tobacco: Never     Tobacco comments:     no second hand smoke at home or work   Substance Use Topics     Alcohol use: Yes     Alcohol/week: 0.0 standard drinks     Comment: 1-2 drinks per months     If you drink alcohol do you typically have >3 drinks per day or >7  drinks per week? 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     Testicular pain, left     chronic, history of epididymitis     Uncomplicated asthma 02/22/2021    Anaphylaxis episode     Varicocele     (L)     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    mildly obstructive voiding pattern with a moderately enlarged prostate on BRITTANY     Zenker's diverticulum              Past Surgical History:      Past Surgical History:   Procedure Laterality Date     COLONOSCOPY  2009     ESOPHAGOSCOPY, GASTROSCOPY, DUODENOSCOPY (EGD), COMBINED  10/30/2012    Procedure: COMBINED ESOPHAGOSCOPY, GASTROSCOPY, DUODENOSCOPY (EGD), BIOPSY SINGLE OR MULTIPLE;  COMBINED ESOPHAGOSCOPY, GASTROSCOPY, DUODENOSCOPY (EGD)  ;  Surgeon: Gregor Albright MD;  Location:  GI     EYE SURGERY  04/16/2015    [facility]ONS[facility]S,<11 Y/O  12/28/95     MASTOIDECTOMY  4/1997    (L) and type III tympanoplasty     SOFT TISSUE SURGERY  2020    Cyst removal     ZZC IMPLANT COCHLEAR DEVICE  12/28/95    (L)             Social History:     Social History     Socioeconomic History     Marital status:      Spouse name: Not on file     Number of children: 1     Years of education: Not on file     Highest education level: Not on file   Occupational History     Occupation: works for [employer] but not doing financial service.   Tobacco Use     Smoking status: Never     Smokeless tobacco: Never     Tobacco comments:     N/A   Substance and Sexual Activity     Alcohol use: Yes     Comment: Since the  "start of the pandemic, 1-2 mixed drinks/month     Drug use: No     Sexual activity: Yes     Partners: Female     Birth control/protection: None     Comment: Spouse stopped using NuvaRing in July 2021   Other Topics Concern     Parent/sibling w/ CABG, MI or angioplasty before 65F 55M? Yes     Comment: Bio. Half-Sister passed away - Nov. 2016 - heart condition   Social History Narrative    Just recent death of mother and paternal grandfather in the last 6 months      Social Determinants of Health     Financial Resource Strain: Not on file   Food Insecurity: Not on file   Transportation Needs: Not on file   Physical Activity: Not on file   Stress: Not on file   Social Connections: Not on file   Intimate Partner Violence: Not on file   Housing Stability: Not on file             Family History:   reviewed         Allergies:   No Known Allergies          Medications:     Current Outpatient Medications   Medication Sig Dispense Refill     diphenhydrAMINE (BENADRYL) 25 MG tablet Take 1 tablet (25 mg) by mouth every 6 hours as needed for itching or allergies 20 tablet 0     DULoxetine (CYMBALTA) 30 MG capsule Take 1 capsule (30 mg) by mouth daily 90 capsule 3     EPINEPHrine (ANY BX GENERIC EQUIV) 0.3 MG/0.3ML injection 2-pack Inject 0.3 mLs (0.3 mg) into the muscle once as needed for anaphylaxis 1 each 3     lisdexamfetamine (VYVANSE) 60 MG capsule Take 1 capsule (60 mg) by mouth every morning 30 capsule 0     melatonin 1 MG TABS tablet Take 1 mg by mouth nightly as needed for sleep                 Review of Systems:   The 10 point Review of Systems is negative other than noted in the HPI           Physical Exam:   Blood pressure 134/72, pulse 112, temperature 97  F (36.1  C), temperature source Tympanic, resp. rate 16, height 1.765 m (5' 9.5\"), weight 99.3 kg (219 lb), SpO2 99 %.    Exam:  Constitutional: healthy appearing, alert and in no distress  Heent: Normocephalic. Head without obvious masses or lesions. [facility] " EOMI. Mouth exam within normal limits: tongue, mucous membranes, posterior pharynx all normal, no lesions or abnormalities seen.  Tm's and canals within normal limits bilaterally. Neck supple, no nuchal rigidity or masses. No supraclavicular, or cervical adenopathy. Thyroid symmetric, no masses.  Cardiovascular: Regular rate and rhythm, no murmer, rub or gallops.  JVP not elevated, no edema.  Carotids within normal limits bilaterally, no bruits.  Respiratory: Normal respiratory effort.  Lungs clear, normal flow, no wheezing or crackles.  Breasts: Normal bilaterally.  No masses or lesions.  Nipples within normal limites.  No axillary lesions or nodes.  Gastrointestinal: Normal active bowel sounds.   Soft, not tender, no masses, guarding or rebound.  No hepatosplenomegaly.   Genitourinary: Rectal not done  Musculoskeletal: extremities normal, no gross deformities noted.  Skin: no suspicious lesions or rashes   Neurologic: Mental status within normal limits.  Speech fluent.  No gross motor abnormalities and gait intact.  Psychiatric: mentation appears normal and affect normal.         Data:   Labs to do fasting        Assessment:   1. Normal complete physical exam  2. Depression/anxiety/adhd, no signs med abuse and overall doing well, pdmp checked.  Could consider change to wellbutrin given erectile issues but for now wants to wait  3. Low back pain, suspect msk, to try physical therapy  4. hcm         Plan:   Letter with labs  Flu shot  physical therapy  Call if wants to change to wellbutrin  Exercise, diet, weight loss      Estrada De La Rosa M.D.

## 2022-10-30 ENCOUNTER — HEALTH MAINTENANCE LETTER (OUTPATIENT)
Age: 37
End: 2022-10-30

## 2022-10-31 ENCOUNTER — OFFICE VISIT (OUTPATIENT)
Dept: FAMILY MEDICINE | Facility: CLINIC | Age: 37
End: 2022-10-31
Payer: COMMERCIAL

## 2022-10-31 VITALS
SYSTOLIC BLOOD PRESSURE: 134 MMHG | DIASTOLIC BLOOD PRESSURE: 72 MMHG | RESPIRATION RATE: 16 BRPM | BODY MASS INDEX: 31.35 KG/M2 | WEIGHT: 219 LBS | HEART RATE: 112 BPM | OXYGEN SATURATION: 99 % | TEMPERATURE: 97 F | HEIGHT: 70 IN

## 2022-10-31 DIAGNOSIS — F32.0 MILD MAJOR DEPRESSION (H): ICD-10-CM

## 2022-10-31 DIAGNOSIS — Z00.00 ENCOUNTER FOR ANNUAL PHYSICAL EXAM: ICD-10-CM

## 2022-10-31 DIAGNOSIS — F41.1 GAD (GENERALIZED ANXIETY DISORDER): ICD-10-CM

## 2022-10-31 DIAGNOSIS — M54.50 RIGHT-SIDED LOW BACK PAIN WITHOUT SCIATICA, UNSPECIFIED CHRONICITY: ICD-10-CM

## 2022-10-31 DIAGNOSIS — Z23 NEED FOR PROPHYLACTIC VACCINATION AND INOCULATION AGAINST INFLUENZA: Primary | ICD-10-CM

## 2022-10-31 DIAGNOSIS — N52.9 ERECTILE DYSFUNCTION, UNSPECIFIED ERECTILE DYSFUNCTION TYPE: ICD-10-CM

## 2022-10-31 DIAGNOSIS — F90.9 ATTENTION DEFICIT HYPERACTIVITY DISORDER (ADHD), UNSPECIFIED ADHD TYPE: ICD-10-CM

## 2022-10-31 PROBLEM — F41.9 ANXIETY: Status: RESOLVED | Noted: 2022-06-09 | Resolved: 2022-10-31

## 2022-10-31 PROCEDURE — 99395 PREV VISIT EST AGE 18-39: CPT | Mod: 25 | Performed by: INTERNAL MEDICINE

## 2022-10-31 PROCEDURE — 90471 IMMUNIZATION ADMIN: CPT | Performed by: INTERNAL MEDICINE

## 2022-10-31 PROCEDURE — 90686 IIV4 VACC NO PRSV 0.5 ML IM: CPT | Performed by: INTERNAL MEDICINE

## 2022-10-31 PROCEDURE — 99213 OFFICE O/P EST LOW 20 MIN: CPT | Mod: 25 | Performed by: INTERNAL MEDICINE

## 2022-10-31 RX ORDER — DULOXETIN HYDROCHLORIDE 30 MG/1
30 CAPSULE, DELAYED RELEASE ORAL DAILY
Qty: 90 CAPSULE | Refills: 3 | Status: SHIPPED | OUTPATIENT
Start: 2022-10-31 | End: 2023-04-25

## 2022-10-31 ASSESSMENT — ENCOUNTER SYMPTOMS
SORE THROAT: 0
ARTHRALGIAS: 0
JOINT SWELLING: 0
PARESTHESIAS: 0
HEMATURIA: 0
HEMATOCHEZIA: 0
HEARTBURN: 0
FREQUENCY: 0
NAUSEA: 0
PALPITATIONS: 0
WEAKNESS: 0
DYSURIA: 0
CHILLS: 0
NERVOUS/ANXIOUS: 1
CONSTIPATION: 0
EYE PAIN: 0
DIZZINESS: 0
FEVER: 0
ABDOMINAL PAIN: 0
SHORTNESS OF BREATH: 0
MYALGIAS: 0
DIARRHEA: 0
COUGH: 0
HEADACHES: 0

## 2022-10-31 ASSESSMENT — PATIENT HEALTH QUESTIONNAIRE - PHQ9
SUM OF ALL RESPONSES TO PHQ QUESTIONS 1-9: 10
SUM OF ALL RESPONSES TO PHQ QUESTIONS 1-9: 10
10. IF YOU CHECKED OFF ANY PROBLEMS, HOW DIFFICULT HAVE THESE PROBLEMS MADE IT FOR YOU TO DO YOUR WORK, TAKE CARE OF THINGS AT HOME, OR GET ALONG WITH OTHER PEOPLE: SOMEWHAT DIFFICULT

## 2022-10-31 ASSESSMENT — PAIN SCALES - GENERAL: PAINLEVEL: NO PAIN (0)

## 2022-10-31 NOTE — NURSING NOTE
Prior to immunization administration, verified patients identity using patient s name and date of birth. Please see Immunization Activity for additional information.     Screening Questionnaire for Adult Immunization    Are you sick today?   No   Do you have allergies to medications, food, a vaccine component or latex?   No   Have you ever had a serious reaction after receiving a vaccination?   No   Do you have a long-term health problem with heart, lung, kidney, or metabolic disease (e.g., diabetes), asthma, a blood disorder, no spleen, complement component deficiency, a cochlear implant, or a spinal fluid leak?  Are you on long-term aspirin therapy?   No   Do you have cancer, leukemia, HIV/AIDS, or any other immune system problem?   No   Do you have a parent, brother, or sister with an immune system problem?   No   In the past 3 months, have you taken medications that affect  your immune system, such as prednisone, other steroids, or anticancer drugs; drugs for the treatment of rheumatoid arthritis, Crohn s disease, or psoriasis; or have you had radiation treatments?   No   Have you had a seizure, or a brain or other nervous system problem?   No   During the past year, have you received a transfusion of blood or blood    products, or been given immune (gamma) globulin or antiviral drug?   No   For women: Are you pregnant or is there a chance you could become       pregnant during the next month?   No   Have you received any vaccinations in the past 4 weeks?   No     Immunization questionnaire answers were all negative.        Per orders of Dr. De La Rosa, injection of flu given by Lucita Samaniego CMA. Patient instructed to remain in clinic for 15 minutes afterwards, and to report any adverse reaction to me immediately.       Screening performed by Lucita Samaniego CMA on 10/31/2022 at 4:09 PM.

## 2022-10-31 NOTE — NURSING NOTE
"Manjula Dorman is a 37 year old patient who comes in today for a Blood Pressure check.  Initial BP:  BP (!) 151/90 (BP Location: Left arm, Patient Position: Chair, Cuff Size: Adult Large)   Pulse 112   Temp 97  F (36.1  C) (Tympanic)   Resp 16   Ht 1.765 m (5' 9.5\")   Wt 99.3 kg (219 lb)   SpO2 99%   BMI 31.88 kg/m       112  Disposition: provider notified while patient in the clinic  Lucita Duque CMA      "

## 2022-12-23 ENCOUNTER — MYC REFILL (OUTPATIENT)
Dept: FAMILY MEDICINE | Facility: CLINIC | Age: 37
End: 2022-12-23

## 2022-12-23 DIAGNOSIS — F90.9 ATTENTION DEFICIT HYPERACTIVITY DISORDER (ADHD), UNSPECIFIED ADHD TYPE: ICD-10-CM

## 2022-12-23 RX ORDER — LISDEXAMFETAMINE DIMESYLATE 60 MG/1
60 CAPSULE ORAL EVERY MORNING
Qty: 30 CAPSULE | Refills: 0 | Status: SHIPPED | OUTPATIENT
Start: 2022-12-23 | End: 2023-01-22

## 2023-01-22 ENCOUNTER — MYC REFILL (OUTPATIENT)
Dept: FAMILY MEDICINE | Facility: CLINIC | Age: 38
End: 2023-01-22
Payer: COMMERCIAL

## 2023-01-22 DIAGNOSIS — F90.9 ATTENTION DEFICIT HYPERACTIVITY DISORDER (ADHD), UNSPECIFIED ADHD TYPE: ICD-10-CM

## 2023-01-23 RX ORDER — LISDEXAMFETAMINE DIMESYLATE 60 MG/1
60 CAPSULE ORAL EVERY MORNING
Qty: 30 CAPSULE | Refills: 0 | Status: SHIPPED | OUTPATIENT
Start: 2023-01-23 | End: 2023-02-20

## 2023-02-20 ENCOUNTER — MYC REFILL (OUTPATIENT)
Dept: FAMILY MEDICINE | Facility: CLINIC | Age: 38
End: 2023-02-20
Payer: COMMERCIAL

## 2023-02-20 DIAGNOSIS — F90.9 ATTENTION DEFICIT HYPERACTIVITY DISORDER (ADHD), UNSPECIFIED ADHD TYPE: ICD-10-CM

## 2023-02-20 RX ORDER — LISDEXAMFETAMINE DIMESYLATE 60 MG/1
60 CAPSULE ORAL EVERY MORNING
Qty: 30 CAPSULE | Refills: 0 | Status: SHIPPED | OUTPATIENT
Start: 2023-02-20 | End: 2023-03-16

## 2023-03-01 NOTE — TELEPHONE ENCOUNTER
[facility] ENCOUNTER  GI CONSULT NOTE      REQUESTING PROVIDER:  Benigno Johnson MD    REASON FOR REQUEST:  Dysphagia, unspecified type    HISTORY OF PRESENT ILLNESS:  Tano Canas is a 60 year old male who presents for consult.    Patient presents today with a history of dysphagia over the last couple of years. Patient reports that food will feel slow to move down in the lower esophagus. This happens about once per month. Food does not feel like it gets stuck. No difficulty swallowing or choking. Is better if he eats slowly or takes smaller bites. Denies any epigastric pain, nausea, vomiting or heartburn. Does have long history of GERD, currently on pantoprazole and feels that this is controlling her reflux well. Denies unintentional weight loss, bowel changes or melena. No prior history of EGD.     MEDICATIONS:  Current Outpatient Medications   Medication Sig   • ramipril (ALTACE) 10 MG capsule Take 1 capsule by mouth daily.   • sildenafil (REVATIO) 20 MG tablet Take 1-5 tablets by mouth 1-2 hours prior to sexual activity.   • pantoprazole (PROTONIX) 40 MG tablet Take 1 tablet by mouth daily.   • Multiple Vitamin (MULTIVITAMIN ADULT PO) Take 1 tablet by mouth daily.     No current facility-administered medications for this visit.       ALLERGIES:  ALLERGIES:  No Known Allergies     PAST MEDICAL HISTORY:  Patient Active Problem List   Diagnosis   • Rash   • Family history of colon cancer   • Screening for lipoid disorders   • Special screening for malignant neoplasm of prostate   • Presbyopia   • Myopia   • Arthritis of right knee   • Complex tear of medial meniscus of right knee as current injury   • Primary osteoarthritis of right knee   • Tubular adenoma of colon   • Essential hypertension   • Dyslipidemia   • Prediabetes          Essential (primary) hypertension                              Primary osteoarthritis of right knee            10/25/2016    Fracture                                        Third request from pharmacy  Rebecca OCHOA(R)                      Comment: tibia    Chronic pain                                                    Comment: rt knee    Partial hamstring tear                          11/30/2020    Past Surgical History    COLONOSCOPY DIAGNOSTIC                          9/18/15         Comment: Affi 5yr recall, 1 polyp tubular adenoma    LACERATION REPAIR                                               Comment: fingers    TONSILLECTOMY                                                 TOTAL KNEE ARTHROPLASTY                         11/08/2016    COLONOSCOPY DIAGNOSTIC                          04/21/2021      Comment: Affi hx of polyps, TA polyp, recall 5 years    FAMILY HISTORY:  Family History   Problem Relation Age of Onset   • Osteoarthritis Father    • Cancer Father         prostate   • Amblyopia Daughter    • Cancer, Colon Mother    • Cancer Mother         colon   • Colon Polyps Brother          REVIEW OF SYSTEMS:    A complete review of systems was performed and found to be negative except for what was stated in the HPI      I have reviewed the RN/MA's note and agree.  I have reviewed all pertinent labs and imaging.      PHYSICAL EXAM:    Vitals:    Visit Vitals  /88 (BP Location: LUE - Left upper extremity, Patient Position: Sitting, Cuff Size: Large Adult)   Pulse 76   Wt 89.5 kg (197 lb 6.4 oz)   SpO2 98%   BMI 28.73 kg/m²      CONSTITUTIONAL:  The patient is well-developed, well nourished, in no acute distress, appears at stated age  HEENT:  Eyes:  No scleral icterus. Extraocular movements intact  GASTROINTESTINAL:  Abdomen soft, non distended  INTEGUMENTARY: No jaundice on inspection.  NEUROLOGICAL: Alert, normal speech. No gross motor deficit, movements coordinated  PSYCHIATRIC:  Mood and affect appropriate.  Judgement and insight appear appropriate          ASSESSMENT/PLAN:    Esophageal dysphagia  (primary encounter diagnosis)  Gastroesophageal reflux disease, unspecified whether esophagitis present      Lower  esophageal dysphagia over the last couple of weeks only occurring about once per month. Plan for EGD for history of GERD and dysphagia to rule out stricture, Schatzki's ring, esophagitis or mass/lesion. Possible that this is related to acid reflux vs esophageal dysmotility. Continue PPI as prescribed. Further recommendations to follow endoscopy.     A copy of this note will be sent to Benigno Johnson MD. Thank you for involving me in the care of this patient.      RAJESH Islas  Collaborating physician, Dr. Jr Palmer

## 2023-04-05 NOTE — TELEPHONE ENCOUNTER
Pending Prescriptions:                       Disp   Refills    amphetamine-dextroamphetamine (ADDERALL) *30 tab*0            Sig: Take 1 tablet (15 mg) by mouth daily Taken in the           afternoon as needed    lisdexamfetamine (VYVANSE) 50 MG capsule  30 cap*0            Sig: Take 1 capsule (50 mg) by mouth every morning    Adderall 15mg      Last Written Prescription Date:  05/22/2019  Last Fill Quantity: 30,   # refills: 0  Last Office Visit: 03/22/2019  Future Office visit:       Routing refill request to provider for review/approval because:  Drug not on the [facility], [facility] or [facility] refill protocol or controlled substance    Lisdexamfetamine  Last Written Prescription Date:  05/22/2019  Last Fill Quantity: 30,   # refills: 0  Last Office Visit: 03/22/2019  Future Office visit:       Routing refill request to provider for review/approval because:  Drug not on the [facility], [facility] or [facility] refill protocol or controlled substance       Normal  [] Abnormal -    Neck: [x] No visualized mass [] Abnormal -     Pulmonary/Chest: [x] Respiratory effort normal   [x] No visualized signs of difficulty breathing or respiratory distress        [] Abnormal -      Musculoskeletal:   [x] Normal gait with no signs of ataxia         [x] Normal range of motion of neck        [] Abnormal -     Neurological:        [x] No Facial Asymmetry (Cranial nerve 7 motor function) (limited exam due to video visit)          [x] No gaze palsy        [] Abnormal -          Skin:        [x] No significant exanthematous lesions or discoloration noted on facial skin         [] Abnormal -            Psychiatric:       [x] Normal Affect [] Abnormal -        [x] No Hallucinations    Other pertinent observable physical exam findings:-             Razia Burk, was evaluated through a synchronous (real-time) audio-video encounter. The patient (or guardian if applicable) is aware that this is a billable service, which includes applicable co-pays. This Virtual Visit was conducted with patient's (and/or legal guardian's) consent. The visit was conducted pursuant to the emergency declaration under the [address] [place] [address] authority and the [employer] General Act. Patient identification was verified, and a caregiver was present when appropriate.    The patient was located at Home: [address]  Provider was located at [facility] ([address] [place] [address] Dept): [place]  [address]         --Ubaldo Donovan MD

## 2023-04-24 ENCOUNTER — MYC REFILL (OUTPATIENT)
Dept: FAMILY MEDICINE | Facility: CLINIC | Age: 38
End: 2023-04-24
Payer: COMMERCIAL

## 2023-04-24 DIAGNOSIS — F90.9 ATTENTION DEFICIT HYPERACTIVITY DISORDER (ADHD), UNSPECIFIED ADHD TYPE: ICD-10-CM

## 2023-04-24 RX ORDER — LISDEXAMFETAMINE DIMESYLATE 60 MG/1
60 CAPSULE ORAL EVERY MORNING
Qty: 30 CAPSULE | Refills: 0 | Status: SHIPPED | OUTPATIENT
Start: 2023-04-24 | End: 2023-05-20

## 2023-04-25 DIAGNOSIS — F32.0 MILD MAJOR DEPRESSION (H): ICD-10-CM

## 2023-04-25 RX ORDER — DULOXETIN HYDROCHLORIDE 30 MG/1
CAPSULE, DELAYED RELEASE ORAL
Qty: 90 CAPSULE | Refills: 3 | Status: SHIPPED | OUTPATIENT
Start: 2023-04-25 | End: 2023-11-03

## 2023-05-20 ENCOUNTER — MYC REFILL (OUTPATIENT)
Dept: FAMILY MEDICINE | Facility: CLINIC | Age: 38
End: 2023-05-20
Payer: COMMERCIAL

## 2023-05-20 DIAGNOSIS — F90.9 ATTENTION DEFICIT HYPERACTIVITY DISORDER (ADHD), UNSPECIFIED ADHD TYPE: ICD-10-CM

## 2023-05-22 RX ORDER — LISDEXAMFETAMINE DIMESYLATE 60 MG/1
60 CAPSULE ORAL EVERY MORNING
Qty: 30 CAPSULE | Refills: 0 | Status: SHIPPED | OUTPATIENT
Start: 2023-05-22 | End: 2023-06-21

## 2023-06-21 ENCOUNTER — MYC REFILL (OUTPATIENT)
Dept: FAMILY MEDICINE | Facility: CLINIC | Age: 38
End: 2023-06-21
Payer: COMMERCIAL

## 2023-06-21 DIAGNOSIS — F90.9 ATTENTION DEFICIT HYPERACTIVITY DISORDER (ADHD), UNSPECIFIED ADHD TYPE: ICD-10-CM

## 2023-06-21 RX ORDER — LISDEXAMFETAMINE DIMESYLATE 60 MG/1
60 CAPSULE ORAL EVERY MORNING
Qty: 30 CAPSULE | Refills: 0 | Status: SHIPPED | OUTPATIENT
Start: 2023-06-21 | End: 2023-07-23

## 2023-07-23 ENCOUNTER — MYC REFILL (OUTPATIENT)
Dept: FAMILY MEDICINE | Facility: CLINIC | Age: 38
End: 2023-07-23
Payer: COMMERCIAL

## 2023-07-23 DIAGNOSIS — F90.9 ATTENTION DEFICIT HYPERACTIVITY DISORDER (ADHD), UNSPECIFIED ADHD TYPE: ICD-10-CM

## 2023-07-24 RX ORDER — LISDEXAMFETAMINE DIMESYLATE 60 MG/1
60 CAPSULE ORAL EVERY MORNING
Qty: 30 CAPSULE | Refills: 0 | Status: SHIPPED | OUTPATIENT
Start: 2023-07-24 | End: 2023-08-21

## 2023-08-21 ENCOUNTER — MYC REFILL (OUTPATIENT)
Dept: FAMILY MEDICINE | Facility: CLINIC | Age: 38
End: 2023-08-21
Payer: COMMERCIAL

## 2023-08-21 DIAGNOSIS — F90.9 ATTENTION DEFICIT HYPERACTIVITY DISORDER (ADHD), UNSPECIFIED ADHD TYPE: ICD-10-CM

## 2023-08-22 RX ORDER — LISDEXAMFETAMINE DIMESYLATE 60 MG/1
60 CAPSULE ORAL EVERY MORNING
Qty: 30 CAPSULE | Refills: 0 | Status: SHIPPED | OUTPATIENT
Start: 2023-08-22 | End: 2023-10-23

## 2023-08-28 ENCOUNTER — MYC MEDICAL ADVICE (OUTPATIENT)
Dept: FAMILY MEDICINE | Facility: CLINIC | Age: 38
End: 2023-08-28
Payer: COMMERCIAL

## 2023-08-28 DIAGNOSIS — F90.9 ATTENTION DEFICIT HYPERACTIVITY DISORDER (ADHD), UNSPECIFIED ADHD TYPE: Primary | ICD-10-CM

## 2023-08-29 RX ORDER — LISDEXAMFETAMINE DIMESYLATE 50 MG/1
50 CAPSULE ORAL EVERY MORNING
Qty: 30 CAPSULE | Refills: 0 | Status: SHIPPED | OUTPATIENT
Start: 2023-08-29 | End: 2023-11-03

## 2023-08-29 NOTE — TELEPHONE ENCOUNTER
Patient requesting lower dosage of Vyvanse if possible to be able to try and obtain medication as local pharmacies are on shortage of Vyvanse 60 mg.    Quirino Sánchez CMA on 8/29/2023 at 10:14 AM

## 2023-10-16 ENCOUNTER — TELEPHONE (OUTPATIENT)
Dept: FAMILY MEDICINE | Facility: CLINIC | Age: 38
End: 2023-10-16
Payer: COMMERCIAL

## 2023-10-16 NOTE — TELEPHONE ENCOUNTER
Reason for Call:  Appointment Request    Patient requesting this type of appt:  Annual Physical    Requested provider: Estrada De La Rosa    Reason patient unable to be scheduled: Not within requested timeframe    When does patient want to be seen/preferred time: 3-7 days    Comments: PCP is booked until March. Pt would like to be seen for annual physical and discuss ongoing health concerns    Could we send this information to you in [employer] or would you prefer to receive a phone call?:   Patient would like to be contacted via [employer]    Call taken on 10/16/2023 at 3:34 PM by Mila Lopez

## 2023-10-17 ENCOUNTER — MYC MEDICAL ADVICE (OUTPATIENT)
Dept: FAMILY MEDICINE | Facility: CLINIC | Age: 38
End: 2023-10-17
Payer: COMMERCIAL

## 2023-11-02 ASSESSMENT — ENCOUNTER SYMPTOMS
HEADACHES: 0
JOINT SWELLING: 0
FEVER: 0
CHILLS: 0
SORE THROAT: 0
NERVOUS/ANXIOUS: 1
EYE PAIN: 0
FREQUENCY: 1
PARESTHESIAS: 0
COUGH: 0
HEMATURIA: 0
DIZZINESS: 0
SHORTNESS OF BREATH: 0
ABDOMINAL PAIN: 0
HEMATOCHEZIA: 0
DIARRHEA: 0
HEARTBURN: 0
PALPITATIONS: 0
ARTHRALGIAS: 1
CONSTIPATION: 0
WEAKNESS: 0
MYALGIAS: 0
NAUSEA: 0
DYSURIA: 0

## 2023-11-02 ASSESSMENT — PATIENT HEALTH QUESTIONNAIRE - PHQ9
10. IF YOU CHECKED OFF ANY PROBLEMS, HOW DIFFICULT HAVE THESE PROBLEMS MADE IT FOR YOU TO DO YOUR WORK, TAKE CARE OF THINGS AT HOME, OR GET ALONG WITH OTHER PEOPLE: SOMEWHAT DIFFICULT
SUM OF ALL RESPONSES TO PHQ QUESTIONS 1-9: 9
SUM OF ALL RESPONSES TO PHQ QUESTIONS 1-9: 9

## 2023-11-03 ENCOUNTER — OFFICE VISIT (OUTPATIENT)
Dept: FAMILY MEDICINE | Facility: CLINIC | Age: 38
End: 2023-11-03
Payer: COMMERCIAL

## 2023-11-03 VITALS
WEIGHT: 221 LBS | HEIGHT: 70 IN | DIASTOLIC BLOOD PRESSURE: 81 MMHG | RESPIRATION RATE: 16 BRPM | BODY MASS INDEX: 31.64 KG/M2 | HEART RATE: 69 BPM | OXYGEN SATURATION: 96 % | TEMPERATURE: 97.7 F | SYSTOLIC BLOOD PRESSURE: 116 MMHG

## 2023-11-03 DIAGNOSIS — F32.0 MILD MAJOR DEPRESSION (H): ICD-10-CM

## 2023-11-03 DIAGNOSIS — N52.9 ERECTILE DYSFUNCTION, UNSPECIFIED ERECTILE DYSFUNCTION TYPE: ICD-10-CM

## 2023-11-03 DIAGNOSIS — Z23 NEED FOR PROPHYLACTIC VACCINATION AND INOCULATION AGAINST INFLUENZA: ICD-10-CM

## 2023-11-03 DIAGNOSIS — F90.9 ATTENTION DEFICIT HYPERACTIVITY DISORDER (ADHD), UNSPECIFIED ADHD TYPE: ICD-10-CM

## 2023-11-03 DIAGNOSIS — F32.9 MAJOR DEPRESSIVE DISORDER, REMISSION STATUS UNSPECIFIED, UNSPECIFIED WHETHER RECURRENT: ICD-10-CM

## 2023-11-03 DIAGNOSIS — F41.1 GAD (GENERALIZED ANXIETY DISORDER): ICD-10-CM

## 2023-11-03 DIAGNOSIS — Z00.00 ENCOUNTER FOR ANNUAL PHYSICAL EXAM: Primary | ICD-10-CM

## 2023-11-03 LAB
ALBUMIN SERPL BCG-MCNC: 4.3 G/DL (ref 3.5–5.2)
ALP SERPL-CCNC: 71 U/L (ref 40–129)
ALT SERPL W P-5'-P-CCNC: 30 U/L (ref 0–70)
ANION GAP SERPL CALCULATED.3IONS-SCNC: 9 MMOL/L (ref 7–15)
AST SERPL W P-5'-P-CCNC: 24 U/L (ref 0–45)
BILIRUB SERPL-MCNC: 0.4 MG/DL
BUN SERPL-MCNC: 18.6 MG/DL (ref 6–20)
CALCIUM SERPL-MCNC: 9.4 MG/DL (ref 8.6–10)
CHLORIDE SERPL-SCNC: 101 MMOL/L (ref 98–107)
CHOLEST SERPL-MCNC: 238 MG/DL
CREAT SERPL-MCNC: 1.32 MG/DL (ref 0.67–1.17)
DEPRECATED HCO3 PLAS-SCNC: 28 MMOL/L (ref 22–29)
EGFRCR SERPLBLD CKD-EPI 2021: 71 ML/MIN/1.73M2
ERYTHROCYTE [DISTWIDTH] IN BLOOD BY AUTOMATED COUNT: 12 % (ref 10–15)
GLUCOSE SERPL-MCNC: 100 MG/DL (ref 70–99)
HCT VFR BLD AUTO: 45.2 % (ref 40–53)
HCV AB SERPL QL IA: NONREACTIVE
HDLC SERPL-MCNC: 34 MG/DL
HGB BLD-MCNC: 14.9 G/DL (ref 13.3–17.7)
HIV 1+2 AB+HIV1 P24 AG SERPL QL IA: NONREACTIVE
LDLC SERPL CALC-MCNC: 167 MG/DL
MCH RBC QN AUTO: 26.9 PG (ref 26.5–33)
MCHC RBC AUTO-ENTMCNC: 33 G/DL (ref 31.5–36.5)
MCV RBC AUTO: 82 FL (ref 78–100)
NONHDLC SERPL-MCNC: 204 MG/DL
PLATELET # BLD AUTO: 288 10E3/UL (ref 150–450)
POTASSIUM SERPL-SCNC: 4.1 MMOL/L (ref 3.4–5.3)
PROT SERPL-MCNC: 7.5 G/DL (ref 6.4–8.3)
RBC # BLD AUTO: 5.53 10E6/UL (ref 4.4–5.9)
SODIUM SERPL-SCNC: 138 MMOL/L (ref 135–145)
TRIGL SERPL-MCNC: 187 MG/DL
TSH SERPL DL<=0.005 MIU/L-ACNC: 2.34 UIU/ML (ref 0.3–4.2)
WBC # BLD AUTO: 7.2 10E3/UL (ref 4–11)

## 2023-11-03 PROCEDURE — 99213 OFFICE O/P EST LOW 20 MIN: CPT | Mod: 25 | Performed by: INTERNAL MEDICINE

## 2023-11-03 PROCEDURE — 85027 COMPLETE CBC AUTOMATED: CPT | Performed by: INTERNAL MEDICINE

## 2023-11-03 PROCEDURE — 99395 PREV VISIT EST AGE 18-39: CPT | Mod: 25 | Performed by: INTERNAL MEDICINE

## 2023-11-03 PROCEDURE — 36415 COLL VENOUS BLD VENIPUNCTURE: CPT | Performed by: INTERNAL MEDICINE

## 2023-11-03 PROCEDURE — 90471 IMMUNIZATION ADMIN: CPT | Performed by: INTERNAL MEDICINE

## 2023-11-03 PROCEDURE — 87389 HIV-1 AG W/HIV-1&-2 AB AG IA: CPT | Performed by: INTERNAL MEDICINE

## 2023-11-03 PROCEDURE — 90686 IIV4 VACC NO PRSV 0.5 ML IM: CPT | Performed by: INTERNAL MEDICINE

## 2023-11-03 PROCEDURE — 84443 ASSAY THYROID STIM HORMONE: CPT | Performed by: INTERNAL MEDICINE

## 2023-11-03 PROCEDURE — 84403 ASSAY OF TOTAL TESTOSTERONE: CPT | Performed by: INTERNAL MEDICINE

## 2023-11-03 PROCEDURE — 80061 LIPID PANEL: CPT | Performed by: INTERNAL MEDICINE

## 2023-11-03 PROCEDURE — 80053 COMPREHEN METABOLIC PANEL: CPT | Performed by: INTERNAL MEDICINE

## 2023-11-03 PROCEDURE — 86803 HEPATITIS C AB TEST: CPT | Performed by: INTERNAL MEDICINE

## 2023-11-03 RX ORDER — LISDEXAMFETAMINE DIMESYLATE 50 MG/1
50 CAPSULE ORAL EVERY MORNING
Qty: 30 CAPSULE | Refills: 0 | Status: SHIPPED | OUTPATIENT
Start: 2023-11-03 | End: 2023-12-29

## 2023-11-03 RX ORDER — DULOXETIN HYDROCHLORIDE 30 MG/1
30 CAPSULE, DELAYED RELEASE ORAL DAILY
Qty: 90 CAPSULE | Refills: 3 | Status: SHIPPED | OUTPATIENT
Start: 2023-11-03

## 2023-11-03 ASSESSMENT — PAIN SCALES - GENERAL: PAINLEVEL: NO PAIN (0)

## 2023-11-03 NOTE — PROGRESS NOTES
SUBJECTIVE:   CC: Manjula is an 38 year old who presents for preventative health visit.    Overall he is doing well but weight is an issue and not working out reg.  Depression and arlette under control.  For long time some erectile issues, not as firm.  Has adhd and using med and works well but wants to try lower dose.  Pdmp checked and no signs abuse.  No other c/o on review of systems.    , one daughter age 5 with anxiety, he works for new company now as noted.      Healthy Habits:     Getting at least 3 servings of Calcium per day:  NO    Bi-annual eye exam:  Yes    Dental care twice a year:  Yes    Sleep apnea or symptoms of sleep apnea:  Daytime drowsiness    Diet:  Regular (no restrictions)    Frequency of exercise:  1 day/week    Duration of exercise:  15-30 minutes    Taking medications regularly:  Yes    Medication side effects:  None    Additional concerns today:  Yes      Today's PHQ-9 Score:       11/2/2023     9:40 PM   PHQ-9 SCORE   PHQ-9 Total Score MyChart 9 (Mild depression)   PHQ-9 Total Score 9                Past Medical History:      Past Medical History:   Diagnosis Date    Anxiety 2017    Attention deficit disorder with hyperactivity(314.01) 1995    Depressive disorder 2017    Epididymitis     bilateral    GERD (gastroesophageal reflux disease)     Hiatal hernia     Other acne     Other specified disorder of male genital organs(608.89) 1995    Small (R) testicle    Sleep disturbance, unspecified     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    Testicular pain, left     chronic, history of epididymitis    Uncomplicated asthma 02/22/2021    Anaphylaxis episode    Varicocele     (L)    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    mildly obstructive voiding pattern with a moderately enlarged prostate on BRITTANY    Zenker's diverticulum              Past Surgical History:      Past Surgical History:   Procedure Laterality Date    COLONOSCOPY  2009    ESOPHAGOSCOPY, GASTROSCOPY, DUODENOSCOPY (EGD), COMBINED  10/30/2012     Procedure: COMBINED ESOPHAGOSCOPY, GASTROSCOPY, DUODENOSCOPY (EGD), BIOPSY SINGLE OR MULTIPLE;  COMBINED ESOPHAGOSCOPY, GASTROSCOPY, DUODENOSCOPY (EGD)  ;  Surgeon: Gregor Albright MD;  Location:  GI    EYE SURGERY  04/16/2015    LASIK Surgery - [facility], [place], [place]    HC REMOVAL OF TONSILS,<13 Y/O  12/28/95    MASTOIDECTOMY  4/1997    (L) and type III tympanoplasty    SOFT TISSUE SURGERY  2020    Cyst removal    ZZC IMPLANT COCHLEAR DEVICE  12/28/95    (L)             Social History:     Social History     Socioeconomic History    Marital status:      Spouse name: Not on file    Number of children: 1    Years of education: Not on file    Highest education level: Not on file   Occupational History    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 Yes     Comment: Bio. Half-Sister passed away - Nov. 2016 - heart condition   Social History Narrative    Just recent death of mother and paternal grandfather in the last 6 months      Social Determinants of Health     Financial Resource Strain: Low Risk  (11/2/2023)    Financial Resource Strain     Within the past 12 months, have you or your family members you live with been unable to get utilities (heat, electricity) when it was really needed?: No   Food Insecurity: Low Risk  (11/2/2023)    Food Insecurity     Within the past 12 months, did you worry that your food would run out before you got money  "to buy more?: No     Within the past 12 months, did the food you bought just not last and you didn t have money to get more?: No   Transportation Needs: Low Risk  (11/2/2023)    Transportation Needs     Within the past 12 months, has lack of transportation kept you from medical appointments, getting your medicines, non-medical meetings or appointments, work, or from getting things that you need?: No   Physical Activity: Not on file   Stress: Not on file   Social Connections: Not on file   Interpersonal Safety: Not on file   Housing Stability: Low Risk  (11/2/2023)    Housing Stability     Do you have housing? : Yes     Are you worried about losing your housing?: No             Family History:   reviewed         Allergies:   No Known Allergies          Medications:     Current Outpatient Medications   Medication Sig Dispense Refill    diphenhydrAMINE (BENADRYL) 25 MG tablet Take 1 tablet (25 mg) by mouth every 6 hours as needed for itching or allergies 20 tablet 0    DULoxetine (CYMBALTA) 30 MG capsule Take 1 capsule (30 mg) by mouth daily 90 capsule 3    EPINEPHrine (ANY BX GENERIC EQUIV) 0.3 MG/0.3ML injection 2-pack Inject 0.3 mLs (0.3 mg) into the muscle once as needed for anaphylaxis 1 each 3    lisdexamfetamine (VYVANSE) 50 MG capsule Take 1 capsule (50 mg) by mouth every morning 30 capsule 0    melatonin 1 MG TABS tablet Take 1 mg by mouth nightly as needed for sleep                 Review of Systems:     The 10 point Review of Systems is negative other than noted in the HPI           Physical Exam:   Blood pressure 116/81, pulse 69, temperature 97.7  F (36.5  C), temperature source Temporal, resp. rate 16, height 1.765 m (5' 9.5\"), weight 100.2 kg (221 lb), SpO2 96%.    Exam:  Constitutional: healthy appearing, alert and in no distress  Heent: Normocephalic. Head without obvious masses or lesions. PERRLDC, EOMI. Mouth exam within normal limits: tongue, mucous membranes, posterior pharynx all normal, no " lesions or abnormalities seen.  Tm's and canals within normal limits bilaterally. Neck supple, no nuchal rigidity or masses. No supraclavicular, or cervical adenopathy. Thyroid symmetric, no masses.  Cardiovascular: Regular rate and rhythm, no murmer, rub or gallops.  JVP not elevated, no edema.  Carotids within normal limits bilaterally, no bruits.  Respiratory: Normal respiratory effort.  Lungs clear, normal flow, no wheezing or crackles.  Breasts: Normal bilaterally.  No masses or lesions.  Nipples within normal limites.  No axillary lesions or nodes.  Gastrointestinal: Normal active bowel sounds.   Soft, not tender, no masses, guarding or rebound.  No hepatosplenomegaly.   Genitourinary: Rectal not done  Musculoskeletal: extremities normal, no gross deformities noted.  Skin: no suspicious lesions or rashes   Neurologic: Mental status within normal limits.  Speech fluent.  No gross motor abnormalities and gait intact.  Psychiatric: mentation appears normal and affect normal.         Data:   Labs sent        Assessment:   Normal complete physical exam  Depression and anxiety, doing well  Adhd, doing well, ok to try lower dose med  E.d  hcm         Plan:   Exercise, diet and weight loss  Change vyvanse to 50mg  Letter with labs        Estrada De La Rosa M.D.

## 2023-11-06 NOTE — RESULT ENCOUNTER NOTE
Manjula,    It was very nice to see you.  You should be able to view all of your test results.    Your CBC your blood count is normal with no signs of anemia or blood disorders.  Your chemistry panel shows no diabetes.  The sugar is on the upper end of normal.  Your blood salts, liver tests, proteins, HIV test, thyroid test, and hepatitis C tests are all normal.    Your total cholesterol is a bit high at 238.  Your HDL or good cholesterol is low which is genetics and hard to fix.  Your LDL or bad cholesterol is high and regular exercise and a healthy diet can help this.  I do not believe you need medication but will emphasize exercise and diet for this.    Your creatinine which is a kidney test is elevated.  Looking back it has been this way for 13 years or longer so obviously it is a benign process.  The kidney test is not particularly high but above the normal range.  I am not sure as to the cause of this.  Has this ever been evaluated in the past, please send me a note back and let me know.  If not I might suggest having you see a kidney specialist    As you can see overall the labs are fine but there is room for improvement.  Again, please focus on regular exercise, healthy diet and keeping your weight down.  Please send me a note back regarding the kidneys.    Estrada De La Rosa M.D.

## 2023-11-07 LAB — TESTOST SERPL-MCNC: 481 NG/DL (ref 240–950)

## 2023-12-29 ENCOUNTER — MYC REFILL (OUTPATIENT)
Dept: FAMILY MEDICINE | Facility: CLINIC | Age: 38
End: 2023-12-29
Payer: COMMERCIAL

## 2023-12-29 DIAGNOSIS — F90.9 ATTENTION DEFICIT HYPERACTIVITY DISORDER (ADHD), UNSPECIFIED ADHD TYPE: ICD-10-CM

## 2023-12-29 RX ORDER — LISDEXAMFETAMINE DIMESYLATE 50 MG/1
50 CAPSULE ORAL EVERY MORNING
Qty: 30 CAPSULE | Refills: 0 | Status: SHIPPED | OUTPATIENT
Start: 2023-12-29 | End: 2024-01-29

## 2024-01-29 ENCOUNTER — MYC REFILL (OUTPATIENT)
Dept: FAMILY MEDICINE | Facility: CLINIC | Age: 39
End: 2024-01-29
Payer: COMMERCIAL

## 2024-01-29 DIAGNOSIS — F90.9 ATTENTION DEFICIT HYPERACTIVITY DISORDER (ADHD), UNSPECIFIED ADHD TYPE: ICD-10-CM

## 2024-01-29 RX ORDER — LISDEXAMFETAMINE DIMESYLATE 50 MG/1
50 CAPSULE ORAL EVERY MORNING
Qty: 30 CAPSULE | Refills: 0 | Status: SHIPPED | OUTPATIENT
Start: 2024-01-29 | End: 2024-02-25

## 2024-02-25 ENCOUNTER — MYC REFILL (OUTPATIENT)
Dept: FAMILY MEDICINE | Facility: CLINIC | Age: 39
End: 2024-02-25
Payer: COMMERCIAL

## 2024-02-25 DIAGNOSIS — F90.9 ATTENTION DEFICIT HYPERACTIVITY DISORDER (ADHD), UNSPECIFIED ADHD TYPE: ICD-10-CM

## 2024-02-26 RX ORDER — LISDEXAMFETAMINE DIMESYLATE 50 MG/1
50 CAPSULE ORAL EVERY MORNING
Qty: 30 CAPSULE | Refills: 0 | Status: SHIPPED | OUTPATIENT
Start: 2024-02-26 | End: 2024-03-28

## 2024-03-28 ENCOUNTER — MYC REFILL (OUTPATIENT)
Dept: FAMILY MEDICINE | Facility: CLINIC | Age: 39
End: 2024-03-28
Payer: COMMERCIAL

## 2024-03-28 DIAGNOSIS — F90.9 ATTENTION DEFICIT HYPERACTIVITY DISORDER (ADHD), UNSPECIFIED ADHD TYPE: ICD-10-CM

## 2024-03-28 RX ORDER — LISDEXAMFETAMINE DIMESYLATE 50 MG/1
50 CAPSULE ORAL EVERY MORNING
Qty: 30 CAPSULE | Refills: 0 | Status: SHIPPED | OUTPATIENT
Start: 2024-03-28 | End: 2024-04-26

## 2024-04-01 ENCOUNTER — MYC MEDICAL ADVICE (OUTPATIENT)
Dept: FAMILY MEDICINE | Facility: CLINIC | Age: 39
End: 2024-04-01
Payer: COMMERCIAL

## 2024-04-01 ENCOUNTER — MYC REFILL (OUTPATIENT)
Dept: FAMILY MEDICINE | Facility: CLINIC | Age: 39
End: 2024-04-01
Payer: COMMERCIAL

## 2024-04-01 DIAGNOSIS — F90.9 ATTENTION DEFICIT HYPERACTIVITY DISORDER (ADHD), UNSPECIFIED ADHD TYPE: ICD-10-CM

## 2024-04-01 RX ORDER — LISDEXAMFETAMINE DIMESYLATE 50 MG/1
50 CAPSULE ORAL EVERY MORNING
Qty: 30 CAPSULE | Refills: 0 | Status: CANCELLED | OUTPATIENT
Start: 2024-04-01

## 2024-04-01 NOTE — TELEPHONE ENCOUNTER
Sent [employer] message to patient to schedule VV with PCP to further discuss.    Jeanna Lopez RN  [facility]

## 2024-04-01 NOTE — TELEPHONE ENCOUNTER
Please triage, wondering if this needs to be virtual visit to discuss what he has tried, etc.    Estrada De La Rosa M.D.

## 2024-04-02 ENCOUNTER — TELEPHONE (OUTPATIENT)
Dept: FAMILY MEDICINE | Facility: CLINIC | Age: 39
End: 2024-04-02
Payer: COMMERCIAL

## 2024-04-02 NOTE — TELEPHONE ENCOUNTER
Prior authorization has been started in separate telephone encounter.    Quirino Sánchez CMA on 4/2/2024 at 1:51 PM

## 2024-04-02 NOTE — TELEPHONE ENCOUNTER
Prior authorization started in separate telephone encounter.    Quirino Sánchez CMA on 4/2/2024 at 12:15 PM

## 2024-04-02 NOTE — TELEPHONE ENCOUNTER
See MyChart from patient needing provider review.   Please write back directly to pt or advise if clinic follow up needed.     Janee MORILLO, RN  [facility] RN Triage Team

## 2024-04-02 NOTE — TELEPHONE ENCOUNTER
Prior Authorization Retail Medication Request    Medication/Dose: Vyvanse 50 mg capsule  Diagnosis and ICD code (if different than what is on RX):  F90.9  New/renewal/insurance change PA/secondary ins. PA:  Previously Tried and Failed:  Adderall, Concerta, Strattera  Rationale:  Patient stable on medication    Insurance   Primary: OPTUM_IRX  Insurance ID:  [id number]    Secondary (if applicable):  Insurance ID:      Pharmacy Information (if different than what is on RX)  Name:  [id number]  Phone:  [phone]  Fax:[phone]

## 2024-04-04 NOTE — TELEPHONE ENCOUNTER
Notified TC's wife will  script for Adderall  
No. TRICIA screening performed.  STOP BANG Legend: 0-2 = LOW Risk; 3-4 = INTERMEDIATE Risk; 5-8 = HIGH Risk

## 2024-04-13 NOTE — TELEPHONE ENCOUNTER
PA Initiation    Medication: VYVANSE 50 MG PO CAPS  Insurance Company: OptumRX ([facility]) - Phone [phone] Fax [phone]  Pharmacy Filling the Rx: [id number] - [place] - [address] AT [facility] [address] [place] [address]  Filling Pharmacy Phone: [phone]  Filling Pharmacy Fax: [phone]  Start Date: 4/13/2024

## 2024-04-15 NOTE — TELEPHONE ENCOUNTER
Prior Authorization Approval    Medication: VYVANSE 50 MG PO CAPS  Authorization Effective Date: 4/13/2024  Authorization Expiration Date: 4/13/2025  Approved Dose/Quantity:   Reference #:     Insurance Company: [employer]um[employer]mpinj ([facility]) - Phone [phone] Fax [phone]  Expected CoPay: $    CoPay Card Available:      Financial Assistance Needed:   Which Pharmacy is filling the prescription: [id number] - [place], [place] - [address] AT [facility] [address]  Pharmacy Notified: YES  Patient Notified: **Instructed pharmacy to notify patient when script is ready to /ship.**

## 2024-04-26 ENCOUNTER — MYC REFILL (OUTPATIENT)
Dept: FAMILY MEDICINE | Facility: CLINIC | Age: 39
End: 2024-04-26
Payer: COMMERCIAL

## 2024-04-26 DIAGNOSIS — F90.9 ATTENTION DEFICIT HYPERACTIVITY DISORDER (ADHD), UNSPECIFIED ADHD TYPE: ICD-10-CM

## 2024-04-26 RX ORDER — LISDEXAMFETAMINE DIMESYLATE 50 MG/1
50 CAPSULE ORAL EVERY MORNING
Qty: 30 CAPSULE | Refills: 0 | Status: SHIPPED | OUTPATIENT
Start: 2024-04-26 | End: 2024-05-30

## 2024-05-30 ENCOUNTER — MYC REFILL (OUTPATIENT)
Dept: FAMILY MEDICINE | Facility: CLINIC | Age: 39
End: 2024-05-30
Payer: COMMERCIAL

## 2024-05-30 DIAGNOSIS — F90.9 ATTENTION DEFICIT HYPERACTIVITY DISORDER (ADHD), UNSPECIFIED ADHD TYPE: ICD-10-CM

## 2024-05-30 DIAGNOSIS — T78.2XXD ANAPHYLAXIS, SUBSEQUENT ENCOUNTER: ICD-10-CM

## 2024-05-30 RX ORDER — LISDEXAMFETAMINE DIMESYLATE 50 MG/1
50 CAPSULE ORAL EVERY MORNING
Qty: 30 CAPSULE | Refills: 0 | Status: SHIPPED | OUTPATIENT
Start: 2024-05-30 | End: 2024-06-30

## 2024-05-30 RX ORDER — EPINEPHRINE 0.3 MG/.3ML
0.3 INJECTION SUBCUTANEOUS
Qty: 1 EACH | Refills: 3 | Status: SHIPPED | OUTPATIENT
Start: 2024-05-30

## 2024-05-30 NOTE — TELEPHONE ENCOUNTER
Pending Prescriptions:                       Disp   Refills    EPINEPHrine (ANY BX GENERIC EQUIV) 0.3 MG*1 each 3            Sig: Inject 0.3 mLs (0.3 mg) into the muscle once as           needed for anaphylaxis    Last fill April 2022

## 2024-06-30 ENCOUNTER — MYC REFILL (OUTPATIENT)
Dept: FAMILY MEDICINE | Facility: CLINIC | Age: 39
End: 2024-06-30
Payer: COMMERCIAL

## 2024-06-30 DIAGNOSIS — F90.9 ATTENTION DEFICIT HYPERACTIVITY DISORDER (ADHD), UNSPECIFIED ADHD TYPE: ICD-10-CM

## 2024-07-01 RX ORDER — LISDEXAMFETAMINE DIMESYLATE 50 MG/1
50 CAPSULE ORAL EVERY MORNING
Qty: 30 CAPSULE | Refills: 0 | Status: SHIPPED | OUTPATIENT
Start: 2024-07-01 | End: 2024-07-31

## 2024-07-31 ENCOUNTER — MYC REFILL (OUTPATIENT)
Dept: FAMILY MEDICINE | Facility: CLINIC | Age: 39
End: 2024-07-31
Payer: COMMERCIAL

## 2024-07-31 DIAGNOSIS — F90.9 ATTENTION DEFICIT HYPERACTIVITY DISORDER (ADHD), UNSPECIFIED ADHD TYPE: ICD-10-CM

## 2024-07-31 RX ORDER — LISDEXAMFETAMINE DIMESYLATE 50 MG/1
50 CAPSULE ORAL EVERY MORNING
Qty: 30 CAPSULE | Refills: 0 | Status: SHIPPED | OUTPATIENT
Start: 2024-07-31 | End: 2024-08-30

## 2024-08-30 ENCOUNTER — MYC REFILL (OUTPATIENT)
Dept: FAMILY MEDICINE | Facility: CLINIC | Age: 39
End: 2024-08-30
Payer: COMMERCIAL

## 2024-08-30 DIAGNOSIS — F90.9 ATTENTION DEFICIT HYPERACTIVITY DISORDER (ADHD), UNSPECIFIED ADHD TYPE: ICD-10-CM

## 2024-08-30 RX ORDER — LISDEXAMFETAMINE DIMESYLATE 50 MG/1
50 CAPSULE ORAL EVERY MORNING
Qty: 30 CAPSULE | Refills: 0 | Status: SHIPPED | OUTPATIENT
Start: 2024-08-30 | End: 2024-09-30

## 2024-09-30 ENCOUNTER — MYC REFILL (OUTPATIENT)
Dept: FAMILY MEDICINE | Facility: CLINIC | Age: 39
End: 2024-09-30
Payer: COMMERCIAL

## 2024-09-30 DIAGNOSIS — F90.9 ATTENTION DEFICIT HYPERACTIVITY DISORDER (ADHD), UNSPECIFIED ADHD TYPE: ICD-10-CM

## 2024-10-01 RX ORDER — LISDEXAMFETAMINE DIMESYLATE 50 MG/1
50 CAPSULE ORAL EVERY MORNING
Qty: 30 CAPSULE | Refills: 0 | Status: SHIPPED | OUTPATIENT
Start: 2024-10-01

## 2024-10-30 ENCOUNTER — MYC REFILL (OUTPATIENT)
Dept: FAMILY MEDICINE | Facility: CLINIC | Age: 39
End: 2024-10-30
Payer: COMMERCIAL

## 2024-10-30 DIAGNOSIS — F90.9 ATTENTION DEFICIT HYPERACTIVITY DISORDER (ADHD), UNSPECIFIED ADHD TYPE: ICD-10-CM

## 2024-10-31 RX ORDER — LISDEXAMFETAMINE DIMESYLATE 50 MG/1
50 CAPSULE ORAL EVERY MORNING
Qty: 30 CAPSULE | Refills: 0 | Status: SHIPPED | OUTPATIENT
Start: 2024-10-31

## 2024-11-03 ASSESSMENT — PATIENT HEALTH QUESTIONNAIRE - PHQ9: SUM OF ALL RESPONSES TO PHQ QUESTIONS 1-9: 15

## 2024-11-08 ENCOUNTER — OFFICE VISIT (OUTPATIENT)
Dept: FAMILY MEDICINE | Facility: CLINIC | Age: 39
End: 2024-11-08
Payer: COMMERCIAL

## 2024-11-08 VITALS — HEART RATE: 80 BPM | DIASTOLIC BLOOD PRESSURE: 76 MMHG | SYSTOLIC BLOOD PRESSURE: 104 MMHG

## 2024-11-08 DIAGNOSIS — F41.1 GAD (GENERALIZED ANXIETY DISORDER): ICD-10-CM

## 2024-11-08 DIAGNOSIS — F90.9 ATTENTION DEFICIT HYPERACTIVITY DISORDER (ADHD), UNSPECIFIED ADHD TYPE: ICD-10-CM

## 2024-11-08 DIAGNOSIS — Z23 NEED FOR PROPHYLACTIC VACCINATION AND INOCULATION AGAINST INFLUENZA: ICD-10-CM

## 2024-11-08 DIAGNOSIS — Z23 NEED FOR VACCINATION: ICD-10-CM

## 2024-11-08 DIAGNOSIS — T78.2XXD ANAPHYLAXIS, SUBSEQUENT ENCOUNTER: ICD-10-CM

## 2024-11-08 DIAGNOSIS — J45.20 MILD INTERMITTENT ASTHMA WITHOUT COMPLICATION: ICD-10-CM

## 2024-11-08 DIAGNOSIS — F32.9 MAJOR DEPRESSIVE DISORDER, REMISSION STATUS UNSPECIFIED, UNSPECIFIED WHETHER RECURRENT: ICD-10-CM

## 2024-11-08 DIAGNOSIS — F32.0 MILD MAJOR DEPRESSION (H): ICD-10-CM

## 2024-11-08 DIAGNOSIS — Z00.00 ENCOUNTER FOR ANNUAL PHYSICAL EXAM: Primary | ICD-10-CM

## 2024-11-08 DIAGNOSIS — R79.89 ELEVATED SERUM CREATININE: ICD-10-CM

## 2024-11-08 LAB
ALBUMIN SERPL BCG-MCNC: 4.2 G/DL (ref 3.5–5.2)
ALBUMIN UR-MCNC: ABNORMAL MG/DL
ALP SERPL-CCNC: 79 U/L (ref 40–150)
ALT SERPL W P-5'-P-CCNC: 33 U/L (ref 0–70)
ANION GAP SERPL CALCULATED.3IONS-SCNC: 13 MMOL/L (ref 7–15)
APPEARANCE UR: CLEAR
AST SERPL W P-5'-P-CCNC: 25 U/L (ref 0–45)
BACTERIA #/AREA URNS HPF: ABNORMAL /HPF
BILIRUB SERPL-MCNC: 0.2 MG/DL
BILIRUB UR QL STRIP: NEGATIVE
BUN SERPL-MCNC: 21.2 MG/DL (ref 6–20)
CALCIUM SERPL-MCNC: 9.1 MG/DL (ref 8.8–10.4)
CAOX CRY #/AREA URNS HPF: ABNORMAL /HPF
CHLORIDE SERPL-SCNC: 100 MMOL/L (ref 98–107)
CHOLEST SERPL-MCNC: 242 MG/DL
COLOR UR AUTO: YELLOW
CREAT SERPL-MCNC: 1.29 MG/DL (ref 0.67–1.17)
CREAT UR-MCNC: 343 MG/DL
EGFRCR SERPLBLD CKD-EPI 2021: 72 ML/MIN/1.73M2
ERYTHROCYTE [DISTWIDTH] IN BLOOD BY AUTOMATED COUNT: 11.8 % (ref 10–15)
FASTING STATUS PATIENT QL REPORTED: YES
FASTING STATUS PATIENT QL REPORTED: YES
GLUCOSE SERPL-MCNC: 112 MG/DL (ref 70–99)
GLUCOSE UR STRIP-MCNC: NEGATIVE MG/DL
HCO3 SERPL-SCNC: 24 MMOL/L (ref 22–29)
HCT VFR BLD AUTO: 46.5 % (ref 40–53)
HDLC SERPL-MCNC: 35 MG/DL
HGB BLD-MCNC: 15.6 G/DL (ref 13.3–17.7)
HGB UR QL STRIP: NEGATIVE
KETONES UR STRIP-MCNC: NEGATIVE MG/DL
LDLC SERPL CALC-MCNC: 129 MG/DL
LEUKOCYTE ESTERASE UR QL STRIP: NEGATIVE
MCH RBC QN AUTO: 27 PG (ref 26.5–33)
MCHC RBC AUTO-ENTMCNC: 33.5 G/DL (ref 31.5–36.5)
MCV RBC AUTO: 80 FL (ref 78–100)
MICROALBUMIN UR-MCNC: <12 MG/L
MICROALBUMIN/CREAT UR: NORMAL MG/G{CREAT}
NITRATE UR QL: NEGATIVE
NONHDLC SERPL-MCNC: 207 MG/DL
PH UR STRIP: 6 [PH] (ref 5–7)
PLATELET # BLD AUTO: 244 10E3/UL (ref 150–450)
POTASSIUM SERPL-SCNC: 4.1 MMOL/L (ref 3.4–5.3)
PROT SERPL-MCNC: 7.5 G/DL (ref 6.4–8.3)
RBC # BLD AUTO: 5.78 10E6/UL (ref 4.4–5.9)
RBC #/AREA URNS AUTO: ABNORMAL /HPF
SODIUM SERPL-SCNC: 137 MMOL/L (ref 135–145)
SP GR UR STRIP: >=1.03 (ref 1–1.03)
TRIGL SERPL-MCNC: 388 MG/DL
UROBILINOGEN UR STRIP-ACNC: 0.2 E.U./DL
WBC # BLD AUTO: 6.7 10E3/UL (ref 4–11)
WBC #/AREA URNS AUTO: ABNORMAL /HPF

## 2024-11-08 PROCEDURE — 90677 PCV20 VACCINE IM: CPT | Performed by: INTERNAL MEDICINE

## 2024-11-08 PROCEDURE — 82570 ASSAY OF URINE CREATININE: CPT | Performed by: INTERNAL MEDICINE

## 2024-11-08 PROCEDURE — 90656 IIV3 VACC NO PRSV 0.5 ML IM: CPT | Performed by: INTERNAL MEDICINE

## 2024-11-08 PROCEDURE — 80061 LIPID PANEL: CPT | Performed by: INTERNAL MEDICINE

## 2024-11-08 PROCEDURE — 85027 COMPLETE CBC AUTOMATED: CPT | Performed by: INTERNAL MEDICINE

## 2024-11-08 PROCEDURE — 96127 BRIEF EMOTIONAL/BEHAV ASSMT: CPT | Performed by: INTERNAL MEDICINE

## 2024-11-08 PROCEDURE — 82043 UR ALBUMIN QUANTITATIVE: CPT | Performed by: INTERNAL MEDICINE

## 2024-11-08 PROCEDURE — 99395 PREV VISIT EST AGE 18-39: CPT | Mod: 25 | Performed by: INTERNAL MEDICINE

## 2024-11-08 PROCEDURE — 80053 COMPREHEN METABOLIC PANEL: CPT | Performed by: INTERNAL MEDICINE

## 2024-11-08 PROCEDURE — 90471 IMMUNIZATION ADMIN: CPT | Performed by: INTERNAL MEDICINE

## 2024-11-08 PROCEDURE — 90472 IMMUNIZATION ADMIN EACH ADD: CPT | Performed by: INTERNAL MEDICINE

## 2024-11-08 PROCEDURE — 81001 URINALYSIS AUTO W/SCOPE: CPT | Performed by: INTERNAL MEDICINE

## 2024-11-08 PROCEDURE — 36415 COLL VENOUS BLD VENIPUNCTURE: CPT | Performed by: INTERNAL MEDICINE

## 2024-11-08 RX ORDER — ALBUTEROL SULFATE 90 UG/1
2 INHALANT RESPIRATORY (INHALATION) EVERY 6 HOURS PRN
Status: SHIPPED
Start: 2024-11-08

## 2024-11-08 RX ORDER — EPINEPHRINE 0.3 MG/.3ML
0.3 INJECTION SUBCUTANEOUS
Qty: 1 EACH | Refills: 3 | Status: SHIPPED | OUTPATIENT
Start: 2024-11-08

## 2024-11-08 RX ORDER — DULOXETIN HYDROCHLORIDE 30 MG/1
30 CAPSULE, DELAYED RELEASE ORAL DAILY
Qty: 90 CAPSULE | Refills: 3 | Status: SHIPPED | OUTPATIENT
Start: 2024-11-08

## 2024-11-08 RX ORDER — EPINEPHRINE 0.3 MG/.3ML
0.3 INJECTION SUBCUTANEOUS
Qty: 1 EACH | Refills: 3 | Status: SHIPPED | OUTPATIENT
Start: 2024-11-08 | End: 2024-11-08

## 2024-11-08 RX ORDER — DULOXETIN HYDROCHLORIDE 30 MG/1
30 CAPSULE, DELAYED RELEASE ORAL DAILY
Qty: 90 CAPSULE | Refills: 3 | Status: SHIPPED | OUTPATIENT
Start: 2024-11-08 | End: 2024-11-08

## 2024-11-08 SDOH — HEALTH STABILITY: PHYSICAL HEALTH: ON AVERAGE, HOW MANY MINUTES DO YOU ENGAGE IN EXERCISE AT THIS LEVEL?: 30 MIN

## 2024-11-08 SDOH — HEALTH STABILITY: PHYSICAL HEALTH: ON AVERAGE, HOW MANY DAYS PER WEEK DO YOU ENGAGE IN MODERATE TO STRENUOUS EXERCISE (LIKE A BRISK WALK)?: 1 DAY

## 2024-11-08 ASSESSMENT — SOCIAL DETERMINANTS OF HEALTH (SDOH): HOW OFTEN DO YOU GET TOGETHER WITH FRIENDS OR RELATIVES?: ONCE A WEEK

## 2024-11-08 ASSESSMENT — PATIENT HEALTH QUESTIONNAIRE - PHQ9
SUM OF ALL RESPONSES TO PHQ QUESTIONS 1-9: 9
SUM OF ALL RESPONSES TO PHQ QUESTIONS 1-9: 9
10. IF YOU CHECKED OFF ANY PROBLEMS, HOW DIFFICULT HAVE THESE PROBLEMS MADE IT FOR YOU TO DO YOUR WORK, TAKE CARE OF THINGS AT HOME, OR GET ALONG WITH OTHER PEOPLE: NOT DIFFICULT AT ALL

## 2024-11-08 NOTE — PROGRESS NOTES
Joe Fair is a 39 year old, presenting for the following:  Physical and Imm/Inj (Flu Shot)          11/8/2024   General Health   How would you rate your overall physical health? (!) POOR   Feel stress (tense, anxious, or unable to sleep) Rather much      (!) STRESS CONCERN      11/8/2024   Nutrition   Three or more servings of calcium each day? (!) NO   Diet: Regular (no restrictions)   How many servings of fruit and vegetables per day? (!) 0-1   How many sweetened beverages each day? 0-1            11/8/2024   Exercise   Days per week of moderate/strenous exercise 1 day   Average minutes spent exercising at this level 30 min      (!) EXERCISE CONCERN      11/8/2024   Social Factors   Frequency of gathering with friends or relatives Once a week   Worry food won't last until get money to buy more No   Food not last or not have enough money for food? No   Do you have housing? (Housing is defined as stable permanent housing and does not include staying ouside in a car, in a tent, in an abandoned building, in an overnight shelter, or couch-surfing.) Yes   Are you worried about losing your housing? No   Lack of transportation? No   Unable to get utilities (heat,electricity)? No            11/8/2024   Dental   Dentist two times every year? Yes            11/8/2024   TB Screening   Were you born outside of the [place]? No          Today's PHQ-9 Score:       11/8/2024     7:13 AM   PHQ-9 SCORE   PHQ-9 Total Score MyChart 9 (Mild depression)   PHQ-9 Total Score 9        Patient-reported         11/8/2024   Substance Use   Alcohol more than 3/day or more than 7/wk No   Do you use any other substances recreationally? No        Social History     Tobacco Use    Smoking status: Never    Smokeless tobacco: Never    Tobacco comments:     N/A   Substance Use Topics    Alcohol use: Yes     Comment: Since the start of the pandemic, 1-2 mixed drinks/month    Drug use: No         11/8/2024   STI Screening   New sexual partner(s)  since last STI/HIV test? No            11/8/2024   Contraception/Family Planning   Questions about contraception or family planning No      Answers submitted by the patient for this visit:  Patient Health Questionnaire (Submitted on 11/8/2024)  If you checked off any problems, how difficult have these problems made it for you to do your work, take care of things at home, or get along with other people?: Not difficult at all  PHQ9 TOTAL SCORE: 9

## 2024-11-08 NOTE — PROGRESS NOTES
The patient presents for his annual wellness visit.  Overall he is doing well.  Terms of his mental health issues his anxiety and depression are under good control.  He is using the ADD medication daily and appropriately.  I checked the PDMP.  He does have a history of elevated creatinine and I will evaluate that further today.  In terms of asthma and anaphylaxis he is really not had any trouble.  He does not really have to use the albuterol except infrequently.  He has per the patient seen an allergist in the past.    He is  and has 1 daughter age 6.  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  He travels often.    He does workout.  His weight is an issue.               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    Testicular pain, left     chronic, history of epididymitis    Uncomplicated asthma 02/22/2021    Anaphylaxis episode    Varicocele     (L)    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    mildly obstructive voiding pattern with a moderately enlarged prostate on BRITTANY    Zenker's diverticulum              Past Surgical History:      Past Surgical History:   Procedure Laterality Date    COLONOSCOPY  2009    ESOPHAGOSCOPY, GASTROSCOPY, DUODENOSCOPY (EGD), COMBINED  10/30/2012    Procedure: COMBINED ESOPHAGOSCOPY, GASTROSCOPY, DUODENOSCOPY (EGD), BIOPSY SINGLE OR MULTIPLE;  COMBINED ESOPHAGOSCOPY, GASTROSCOPY, DUODENOSCOPY (EGD)  ;  Surgeon: Gregor Albright MD;  Location:  GI    EYE SURGERY  04/16/2015    [facility]  TONSILS,<11 Y/O  12/28/95    MASTOIDECTOMY  4/1997    (L) and type III tympanoplasty    SOFT TISSUE SURGERY  2020    Cyst removal    ZZC IMPLANT COCHLEAR DEVICE  12/28/95    (L)             Social History:     Social History     Socioeconomic History    Marital status:      Spouse name: Not on file    Number of children: 1    Years of education: Not on file    Highest education level: Not on file   Occupational History    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 Yes     Comment: Bio. Half-Sister passed away - Nov. 2016 - heart condition   Social History Narrative    Just recent death of mother and paternal grandfather in the last 6 months      Social Drivers of Health     Financial Resource Strain: Low Risk  (11/8/2024)    Financial Resource Strain     Within the past 12 months, have you or your family members you live with been unable to get utilities (heat, electricity) when it was really needed?: No   Food Insecurity: Low Risk  (11/8/2024)    Food Insecurity     Within the past 12 months, did you worry that your food would run out before you got money to buy more?: No     Within the past 12 months, did the food you bought just not last and you didn t have money to get more?: No   Transportation Needs: Low Risk  (11/8/2024)    Transportation Needs     Within the past 12 months, has lack of transportation kept you from medical appointments, getting your medicines, non-medical meetings or  appointments, work, or from getting things that you need?: No   Physical Activity: Insufficiently Active (11/8/2024)    Exercise Vital Sign     Days of Exercise per Week: 1 day     Minutes of Exercise per Session: 30 min   Stress: Stress Concern Present (11/8/2024)    [employer] - Occupational Stress Questionnaire     Feeling of Stress : Rather much   Social Connections: Unknown (11/8/2024)    Social Connection and Isolation Panel [NHANES]     Frequency of Communication with Friends and Family: Not on file     Frequency of Social Gatherings with Friends and Family: Once a week     Attends Orthodox Services: Not on file     Active Member of Clubs or Organizations: Not on file     Attends Club or Organization Meetings: Not on file     Marital Status: Not on file   Interpersonal Safety: Not on file   Housing Stability: Low Risk  (11/8/2024)    Housing Stability     Do you have housing? : Yes     Are you worried about losing your housing?: No             Family History:   reviewed         Allergies:   No Known Allergies          Medications:     Current Outpatient Medications   Medication Sig Dispense Refill    albuterol (PROAIR HFA/PROVENTIL HFA/VENTOLIN HFA) 108 (90 Base) MCG/ACT inhaler Inhale 2 puffs into the lungs every 6 hours as needed for shortness of breath, wheezing or cough.      DULoxetine (CYMBALTA) 30 MG capsule Take 1 capsule (30 mg) by mouth daily. 90 capsule 3    EPINEPHrine (ANY BX GENERIC EQUIV) 0.3 MG/0.3ML injection 2-pack Inject 0.3 mLs (0.3 mg) into the muscle once as needed for anaphylaxis. 1 each 3    lisdexamfetamine (VYVANSE) 50 MG capsule Take 1 capsule (50 mg) by mouth every morning. 30 capsule 0    melatonin 1 MG TABS tablet Take 1 mg by mouth nightly as needed for sleep                 Review of Systems:     The 10 point Review of Systems is negative other than noted in the HPI           Physical Exam:   Blood pressure 104/76, pulse  80.    Exam:  Constitutional: healthy appearing, alert and in no distress  Heent: Normocephalic. Head without obvious masses or lesions. PERRLDC, EOMI. Mouth exam within normal limits: tongue, mucous membranes, posterior pharynx all normal, no lesions or abnormalities seen.  Tm's and canals within normal limits bilaterally. Neck supple, no nuchal rigidity or masses. No supraclavicular, or cervical adenopathy. Thyroid symmetric, no masses.  Cardiovascular: Regular rate and rhythm, no murmer, rub or gallops.  JVP not elevated, no edema.  Carotids within normal limits bilaterally, no bruits.  Respiratory: Normal respiratory effort.  Lungs clear, normal flow, no wheezing or crackles.  Breasts: Normal bilaterally.  No masses or lesions.  Nipples within normal limites.  No axillary lesions or nodes.  Gastrointestinal: Normal active bowel sounds.   Soft, not tender, no masses, guarding or rebound.  No hepatosplenomegaly.   Genitourinary: Rectal not done  Musculoskeletal: extremities normal, no gross deformities noted.  Skin: no suspicious lesions or rashes   Neurologic: Mental status within normal limits.  Speech fluent.  No gross motor abnormalities and gait intact.  Psychiatric: mentation appears normal and affect normal.         Data:   Labs sent        Assessment:   Normal complete physical exam  ADD, doing well, no signs of med abuse  Anxiety and depression, doing well, refill medication  History of anaphylaxis, has epi available, no issues  Elevated creatinine, further evaluation today, consider renal ultrasound  Asthma, controlled  Healthcare maintenance         Plan:   Vaccines today  Exercise, diet, weight loss recommended  Call if changes  Refilled medications  Letter with labs and consider renal ultrasound and further evaluation      Estrada De La Rosa M.D.

## 2024-11-08 NOTE — RESULT ENCOUNTER NOTE
Manjula,    It was very nice seeing you.  You should be able to view all of your test results.    Your CBC or blood count is normal with no signs of anemia or blood disorders.  Your chemistry panel shows no diabetes but your sugar is elevated.  Please be sure to exercise more and get your weight down for this.  Your blood salts, kidney tests, liver tests, and proteins are stable.  Your kidney test your creatinine remains slightly elevated.  I am not at all concerned but to be safe I would like to just check a kidney ultrasound to make sure there is no structural issues.  I will order that and have radiology call you.    Your total cholesterol is too high at 242 and the triglycerides are high.  The LDL or bad cholesterol is high as well.  Again, exercise, diet and weight loss should help this.    Lastly your urine is fine.    Please let me know if you are not contacted to schedule the kidney ultrasound.  Please work on the above measures to improve your overall health.    If you have questions let me know.    Estrada De La Rosa M.D.

## 2024-11-15 ENCOUNTER — HOSPITAL ENCOUNTER (OUTPATIENT)
Dept: ULTRASOUND IMAGING | Facility: CLINIC | Age: 39
Discharge: HOME | End: 2024-11-15
Attending: INTERNAL MEDICINE | Admitting: INTERNAL MEDICINE
Payer: COMMERCIAL

## 2024-11-15 DIAGNOSIS — R79.89 ELEVATED SERUM CREATININE: ICD-10-CM

## 2024-11-15 PROCEDURE — 76770 US EXAM ABDO BACK WALL COMP: CPT

## 2024-11-18 NOTE — RESULT ENCOUNTER NOTE
Manjula,    The ultrasound of your kidneys is normal which is great.  Given the fact that the creatinine test has not changed for so many years I am not at all concerned.  Please avoid what we call NSAIDs which are medication such as Advil, ibuprofen, and Aleve.  Tylenol is fine.  We should check your creatinine test yearly.    Please let me know if you have questions.    Estrada De La Rosa M.D.

## 2024-11-24 ENCOUNTER — MYC REFILL (OUTPATIENT)
Dept: FAMILY MEDICINE | Facility: CLINIC | Age: 39
End: 2024-11-24
Payer: COMMERCIAL

## 2024-11-24 DIAGNOSIS — F90.9 ATTENTION DEFICIT HYPERACTIVITY DISORDER (ADHD), UNSPECIFIED ADHD TYPE: ICD-10-CM

## 2024-11-25 RX ORDER — LISDEXAMFETAMINE DIMESYLATE 50 MG/1
50 CAPSULE ORAL EVERY MORNING
Qty: 30 CAPSULE | Refills: 0 | Status: SHIPPED | OUTPATIENT
Start: 2024-11-25

## 2025-01-02 ENCOUNTER — MYC REFILL (OUTPATIENT)
Dept: FAMILY MEDICINE | Facility: CLINIC | Age: 40
End: 2025-01-02
Payer: COMMERCIAL

## 2025-01-02 DIAGNOSIS — F90.9 ATTENTION DEFICIT HYPERACTIVITY DISORDER (ADHD), UNSPECIFIED ADHD TYPE: ICD-10-CM

## 2025-01-02 RX ORDER — LISDEXAMFETAMINE DIMESYLATE 50 MG/1
50 CAPSULE ORAL EVERY MORNING
Qty: 30 CAPSULE | Refills: 0 | Status: SHIPPED | OUTPATIENT
Start: 2025-01-02

## 2025-01-29 ENCOUNTER — MYC REFILL (OUTPATIENT)
Dept: FAMILY MEDICINE | Facility: CLINIC | Age: 40
End: 2025-01-29
Payer: COMMERCIAL

## 2025-01-29 DIAGNOSIS — F90.9 ATTENTION DEFICIT HYPERACTIVITY DISORDER (ADHD), UNSPECIFIED ADHD TYPE: ICD-10-CM

## 2025-01-30 RX ORDER — LISDEXAMFETAMINE DIMESYLATE 50 MG/1
50 CAPSULE ORAL EVERY MORNING
Qty: 30 CAPSULE | Refills: 0 | Status: SHIPPED | OUTPATIENT
Start: 2025-01-30

## 2025-02-23 ENCOUNTER — MYC REFILL (OUTPATIENT)
Dept: FAMILY MEDICINE | Facility: CLINIC | Age: 40
End: 2025-02-23
Payer: COMMERCIAL

## 2025-02-23 DIAGNOSIS — F90.9 ATTENTION DEFICIT HYPERACTIVITY DISORDER (ADHD), UNSPECIFIED ADHD TYPE: ICD-10-CM

## 2025-02-24 RX ORDER — LISDEXAMFETAMINE DIMESYLATE 50 MG/1
50 CAPSULE ORAL EVERY MORNING
Qty: 30 CAPSULE | Refills: 0 | Status: SHIPPED | OUTPATIENT
Start: 2025-02-24

## 2025-03-04 NOTE — TELEPHONE ENCOUNTER
adderall      Last Written Prescription Date:  10/27/17  Last Fill Quantity: 30,   # refills: 0  Last Office Visit: 10/27/17  Future Office visit:       Routing refill request to provider for review/approval because:  Drug not on the [facility], [facility] or [facility] refill protocol or controlled substance     Male

## 2025-03-24 ENCOUNTER — MYC REFILL (OUTPATIENT)
Dept: FAMILY MEDICINE | Facility: CLINIC | Age: 40
End: 2025-03-24
Payer: COMMERCIAL

## 2025-03-24 DIAGNOSIS — F90.9 ATTENTION DEFICIT HYPERACTIVITY DISORDER (ADHD), UNSPECIFIED ADHD TYPE: ICD-10-CM

## 2025-03-24 RX ORDER — LISDEXAMFETAMINE DIMESYLATE 50 MG/1
50 CAPSULE ORAL EVERY MORNING
Qty: 30 CAPSULE | Refills: 0 | Status: SHIPPED | OUTPATIENT
Start: 2025-03-24

## 2025-05-26 ENCOUNTER — MYC REFILL (OUTPATIENT)
Dept: FAMILY MEDICINE | Facility: CLINIC | Age: 40
End: 2025-05-26
Payer: COMMERCIAL

## 2025-05-26 DIAGNOSIS — F90.9 ATTENTION DEFICIT HYPERACTIVITY DISORDER (ADHD), UNSPECIFIED ADHD TYPE: ICD-10-CM

## 2025-05-27 RX ORDER — LISDEXAMFETAMINE DIMESYLATE 50 MG/1
50 CAPSULE ORAL EVERY MORNING
Qty: 30 CAPSULE | Refills: 0 | Status: SHIPPED | OUTPATIENT
Start: 2025-05-27

## 2025-06-28 ENCOUNTER — MYC REFILL (OUTPATIENT)
Dept: FAMILY MEDICINE | Facility: CLINIC | Age: 40
End: 2025-06-28
Payer: COMMERCIAL

## 2025-06-28 DIAGNOSIS — F90.9 ATTENTION DEFICIT HYPERACTIVITY DISORDER (ADHD), UNSPECIFIED ADHD TYPE: ICD-10-CM

## 2025-06-30 RX ORDER — LISDEXAMFETAMINE DIMESYLATE 50 MG/1
50 CAPSULE ORAL EVERY MORNING
Qty: 30 CAPSULE | Refills: 0 | Status: SHIPPED | OUTPATIENT
Start: 2025-06-30

## 2025-07-31 ENCOUNTER — MYC REFILL (OUTPATIENT)
Dept: FAMILY MEDICINE | Facility: CLINIC | Age: 40
End: 2025-07-31
Payer: COMMERCIAL

## 2025-07-31 DIAGNOSIS — F90.9 ATTENTION DEFICIT HYPERACTIVITY DISORDER (ADHD), UNSPECIFIED ADHD TYPE: ICD-10-CM

## 2025-07-31 RX ORDER — LISDEXAMFETAMINE DIMESYLATE 50 MG/1
50 CAPSULE ORAL EVERY MORNING
Qty: 30 CAPSULE | Refills: 0 | Status: SHIPPED | OUTPATIENT
Start: 2025-07-31

## 2025-08-29 ENCOUNTER — MYC REFILL (OUTPATIENT)
Dept: FAMILY MEDICINE | Facility: CLINIC | Age: 40
End: 2025-08-29
Payer: COMMERCIAL

## 2025-08-29 DIAGNOSIS — F90.9 ATTENTION DEFICIT HYPERACTIVITY DISORDER (ADHD), UNSPECIFIED ADHD TYPE: ICD-10-CM

## 2025-08-29 RX ORDER — LISDEXAMFETAMINE DIMESYLATE 50 MG/1
50 CAPSULE ORAL EVERY MORNING
Qty: 30 CAPSULE | Refills: 0 | OUTPATIENT
Start: 2025-08-29

## 2025-09-02 RX ORDER — LISDEXAMFETAMINE DIMESYLATE 50 MG/1
50 CAPSULE ORAL EVERY MORNING
Qty: 30 CAPSULE | Refills: 0 | Status: SHIPPED | OUTPATIENT
Start: 2025-09-02